# Patient Record
Sex: MALE | Race: BLACK OR AFRICAN AMERICAN | Employment: FULL TIME | URBAN - METROPOLITAN AREA
[De-identification: names, ages, dates, MRNs, and addresses within clinical notes are randomized per-mention and may not be internally consistent; named-entity substitution may affect disease eponyms.]

---

## 2022-09-28 ENCOUNTER — HOSPITAL ENCOUNTER (EMERGENCY)
Age: 42
Discharge: HOME | End: 2022-09-29
Payer: MEDICAID

## 2022-09-28 VITALS
OXYGEN SATURATION: 97 % | RESPIRATION RATE: 18 BRPM | BODY MASS INDEX: 32.58 KG/M2 | DIASTOLIC BLOOD PRESSURE: 67 MMHG | TEMPERATURE: 98.8 F | HEART RATE: 72 BPM | SYSTOLIC BLOOD PRESSURE: 114 MMHG | HEIGHT: 69 IN | WEIGHT: 220 LBS

## 2022-09-28 DIAGNOSIS — H61.23 BILATERAL IMPACTED CERUMEN: ICD-10-CM

## 2022-09-28 DIAGNOSIS — J02.0 STREPTOCOCCAL SORE THROAT: Primary | ICD-10-CM

## 2022-09-28 DIAGNOSIS — R05.9 COUGH: ICD-10-CM

## 2022-09-28 LAB — STREP GRP A PCR: POSITIVE

## 2022-09-28 PROCEDURE — 99283 EMERGENCY DEPT VISIT LOW MDM: CPT

## 2022-09-28 PROCEDURE — 87502 INFLUENZA DNA AMP PROBE: CPT

## 2022-09-28 PROCEDURE — 87651 STREP A DNA AMP PROBE: CPT

## 2022-09-28 PROCEDURE — 87635 SARS-COV-2 COVID-19 AMP PRB: CPT

## 2022-09-28 RX ORDER — IBUPROFEN 800 MG/1
800 TABLET ORAL ONCE
Status: COMPLETED | OUTPATIENT
Start: 2022-09-28 | End: 2022-09-29

## 2022-09-28 RX ORDER — BENZONATATE 100 MG/1
100 CAPSULE ORAL ONCE
Status: COMPLETED | OUTPATIENT
Start: 2022-09-28 | End: 2022-09-29

## 2022-09-28 ASSESSMENT — PAIN DESCRIPTION - LOCATION: LOCATION: GENERALIZED

## 2022-09-28 ASSESSMENT — PAIN - FUNCTIONAL ASSESSMENT: PAIN_FUNCTIONAL_ASSESSMENT: 0-10

## 2022-09-28 ASSESSMENT — PAIN SCALES - GENERAL: PAINLEVEL_OUTOF10: 7

## 2022-09-28 ASSESSMENT — PAIN DESCRIPTION - PAIN TYPE: TYPE: ACUTE PAIN

## 2022-09-28 ASSESSMENT — PAIN DESCRIPTION - FREQUENCY: FREQUENCY: CONTINUOUS

## 2022-09-28 ASSESSMENT — PAIN DESCRIPTION - DESCRIPTORS: DESCRIPTORS: ACHING

## 2022-09-28 NOTE — Clinical Note
Kiesha Delgado was seen and treated in our emergency department on 9/28/2022. He may return to work on 10/01/2022. If you have any questions or concerns, please don't hesitate to call.       Brayden Banks PA-C

## 2022-09-29 LAB
INFLUENZA A BY PCR: NEGATIVE
INFLUENZA B BY PCR: NEGATIVE
SARS-COV-2, NAAT: NOT DETECTED

## 2022-09-29 PROCEDURE — 6370000000 HC RX 637 (ALT 250 FOR IP): Performed by: PHYSICIAN ASSISTANT

## 2022-09-29 RX ORDER — BENZONATATE 100 MG/1
100 CAPSULE ORAL 3 TIMES DAILY PRN
Qty: 20 CAPSULE | Refills: 0 | Status: SHIPPED | OUTPATIENT
Start: 2022-09-29

## 2022-09-29 RX ORDER — IBUPROFEN 600 MG/1
600 TABLET ORAL EVERY 8 HOURS PRN
Qty: 20 TABLET | Refills: 0 | Status: SHIPPED | OUTPATIENT
Start: 2022-09-29

## 2022-09-29 RX ORDER — AMOXICILLIN 500 MG/1
500 CAPSULE ORAL 3 TIMES DAILY
Qty: 30 CAPSULE | Refills: 0 | Status: SHIPPED | OUTPATIENT
Start: 2022-09-29 | End: 2022-10-09

## 2022-09-29 RX ORDER — AMOXICILLIN 500 MG/1
500 CAPSULE ORAL ONCE
Status: COMPLETED | OUTPATIENT
Start: 2022-09-29 | End: 2022-09-29

## 2022-09-29 RX ADMIN — AMOXICILLIN 500 MG: 500 CAPSULE ORAL at 00:07

## 2022-09-29 RX ADMIN — BENZONATATE 100 MG: 100 CAPSULE ORAL at 00:07

## 2022-09-29 RX ADMIN — IBUPROFEN 800 MG: 800 TABLET, FILM COATED ORAL at 00:07

## 2022-09-29 ASSESSMENT — ENCOUNTER SYMPTOMS
NAUSEA: 1
ANAL BLEEDING: 0
SHORTNESS OF BREATH: 0
VOMITING: 0
COUGH: 1
EYE DISCHARGE: 0
APNEA: 0
VOICE CHANGE: 0
ABDOMINAL DISTENTION: 0
ABDOMINAL PAIN: 0
BACK PAIN: 0

## 2022-09-29 ASSESSMENT — PAIN - FUNCTIONAL ASSESSMENT: PAIN_FUNCTIONAL_ASSESSMENT: NONE - DENIES PAIN

## 2022-09-29 NOTE — ED PROVIDER NOTES
[address] [facility] ED  eMERGENCY dEPARTMENT eNCOUnter      Pt Name: Carly Crouch  MRN: [id number]  [place] 1980  Date of evaluation: 9/28/2022  Provider: Josie Hammans, PA-C    CHIEF COMPLAINT       Chief Complaint   Patient presents with    Concern For COVID-19     Body aches,    Nausea    Diarrhea         HISTORY OF PRESENT ILLNESS   (Location/Symptom, Timing/Onset,Context/Setting, Quality, Duration, Modifying Factors, Severity)  Note limiting factors. Carly Crouch is a 42 y.o. male who presents to the emergency department  pt has 2 day hx of cough sore thorat nausea and feeling hot/cold. Took tylenol prior to arrival. Symptoms mild to moderate in severity. HPI    NursingNotes were reviewed. REVIEW OF SYSTEMS    (2-9 systems for level 4, 10 or more for level 5)     Review of Systems   Constitutional:  Positive for chills and fever. Negative for activity change, appetite change and unexpected weight change. HENT:  Negative for ear discharge, nosebleeds and voice change. Eyes:  Negative for discharge. Respiratory:  Positive for cough. Negative for apnea and shortness of breath. Cardiovascular:  Negative for chest pain and leg swelling. Gastrointestinal:  Positive for nausea. Negative for abdominal distention, abdominal pain, anal bleeding and vomiting. Genitourinary:  Negative for dysuria and hematuria. Musculoskeletal:  Positive for myalgias. Negative for back pain, joint swelling, neck pain and neck stiffness. Skin:  Negative for pallor. Neurological:  Negative for weakness. Hematological:  Does not bruise/bleed easily. Psychiatric/Behavioral:  Positive for sleep disturbance. Negative for self-injury. All other systems reviewed and are negative. Except as noted above the remainder of the review of systems was reviewed and negative.        PAST MEDICAL HISTORY     Past Medical History:   Diagnosis Date    Anxiety     Chronic back pain     Depression Diverticulitis     Fatty liver          SURGICALHISTORY       Past Surgical History:   Procedure Laterality Date    COLONOSCOPY  1/20/15    UPPER GASTROINTESTINAL ENDOSCOPY  1/20/15         CURRENT MEDICATIONS       Previous Medications    No medications on file            Patient has no known allergies. FAMILY HISTORY       Family History   Problem Relation Age of Onset    Diabetes Mother     Cancer Mother     Arthritis Father     Other Father         anxiety    Asthma Sister     Asthma Brother           SOCIAL HISTORY       Social History     Socioeconomic History    Marital status:      Spouse name: None    Number of children: None    Years of education: None    Highest education level: None   Tobacco Use    Smoking status: Every Day     Types: E-Cigarettes    Smokeless tobacco: Never   Substance and Sexual Activity    Alcohol use: Yes     Comment: ocasionally    Drug use: Yes     Types: Marijuana (Weed)     Comment: daily    Sexual activity: Yes       SCREENINGS   Reta Coma Scale  Eye Opening: Spontaneous  Best Verbal Response: Oriented  Best Motor Response: Obeys commands  [place] Coma Scale Score: 15  Ebola Virus Disease (EVD) Screening   Temp: 98.8 °F (37.1 °C)  CIWA Assessment  BP: 114/67  Heart Rate: 72    PHYSICAL EXAM    (up to 7 for level 4, 8 or more for level 5)     ED Triage Vitals [09/28/22 2311]   BP Temp Temp Source Heart Rate Resp SpO2 Height Weight   114/67 98.8 °F (37.1 °C) Oral 72 18 97 % 5' 9\" (1.753 m) 220 lb (99.8 kg)       Physical Exam  Vitals and nursing note reviewed. Constitutional:       General: He is not in acute distress. Appearance: He is well-developed. HENT:      Head: Normocephalic and atraumatic. Right Ear: External ear normal. There is impacted cerumen. Left Ear: External ear normal. There is impacted cerumen. Nose: Nose normal.      Mouth/Throat:      Mouth: Mucous membranes are moist.      Pharynx: No oropharyngeal exudate.    Eyes: General:         Right eye: No discharge. Left eye: No discharge. Pupils: Pupils are equal, round, and reactive to light. Cardiovascular:      Rate and Rhythm: Normal rate and regular rhythm. Pulses: Normal pulses. Heart sounds: Normal heart sounds. Pulmonary:      Effort: Pulmonary effort is normal. No respiratory distress. Breath sounds: Normal breath sounds. No stridor. Abdominal:      General: Bowel sounds are normal. There is no distension. Palpations: Abdomen is soft. Tenderness: There is no abdominal tenderness. Musculoskeletal:         General: Normal range of motion. Cervical back: Normal range of motion and neck supple. Skin:     General: Skin is warm. Findings: No erythema. Neurological:      Mental Status: He is alert and oriented to person, place, and time. Psychiatric:         Mood and Affect: Mood normal.       RESULTS     EKG: All EKG's are interpreted by the Emergency Department Physician who either signs or Co-signsthis chart in the absence of a cardiologist.         RADIOLOGY:   Donivan Sleek such as CT, Ultrasound and MRI are read by the radiologist. Plain radiographic images are visualized and preliminarily interpreted by the emergency physician with the below findings:           Interpretation per the Radiologist below, if available at the time ofthis note:    No orders to display         ED BEDSIDE ULTRASOUND:   Performed by ED Physician - none    LABS:  Labs Reviewed   RAPID STREP SCREEN - Abnormal; Notable for the following components:       Result Value    Strep Grp A PCR POSITIVE (*)     All other components within normal limits   COVID-19, RAPID   RAPID INFLUENZA A/B ANTIGENS       All other labs were within normal range or not returned as of this dictation.     EMERGENCY DEPARTMENT COURSE and DIFFERENTIAL DIAGNOSIS/MDM:   Vitals:    Vitals:    09/28/22 2311   BP: 114/67   Pulse: 72   Resp: 18   Temp: 98.8 °F (37.1 °C)

## 2022-09-29 NOTE — ED TRIAGE NOTES
Pt here for complaints of covid symptoms  He complains of diarrhea, chills, body aches and sore throat  Pt states he took a home covid test but it was negative  Pt states he is unable to eat anything  Pt admits to smoking marijuana daily. He vapes daily.    Pt alert and oriented x 4

## 2023-12-06 ENCOUNTER — APPOINTMENT (OUTPATIENT)
Dept: RADIOLOGY | Facility: HOSPITAL | Age: 43
End: 2023-12-06
Payer: COMMERCIAL

## 2023-12-06 ENCOUNTER — APPOINTMENT (OUTPATIENT)
Dept: CARDIOLOGY | Facility: HOSPITAL | Age: 43
End: 2023-12-06
Payer: COMMERCIAL

## 2023-12-06 ENCOUNTER — HOSPITAL ENCOUNTER (OUTPATIENT)
Facility: HOSPITAL | Age: 43
Setting detail: OBSERVATION
Discharge: HOME | End: 2023-12-07
Attending: EMERGENCY MEDICINE | Admitting: STUDENT IN AN ORGANIZED HEALTH CARE EDUCATION/TRAINING PROGRAM
Payer: COMMERCIAL

## 2023-12-06 DIAGNOSIS — I10 ESSENTIAL (PRIMARY) HYPERTENSION: ICD-10-CM

## 2023-12-06 DIAGNOSIS — F14.90 COCAINE USE: ICD-10-CM

## 2023-12-06 DIAGNOSIS — I16.0 HYPERTENSIVE URGENCY: Primary | ICD-10-CM

## 2023-12-06 DIAGNOSIS — G45.9 TIA (TRANSIENT ISCHEMIC ATTACK): ICD-10-CM

## 2023-12-06 DIAGNOSIS — R20.2 PARESTHESIA OF LEFT ARM: ICD-10-CM

## 2023-12-06 LAB
ALBUMIN SERPL BCP-MCNC: 4.6 G/DL (ref 3.4–5)
ALP SERPL-CCNC: 67 U/L (ref 33–120)
ALT SERPL W P-5'-P-CCNC: 22 U/L (ref 10–52)
AMPHETAMINES UR QL SCN: ABNORMAL
ANION GAP SERPL CALC-SCNC: 13 MMOL/L (ref 10–20)
AORTIC VALVE MEAN GRADIENT: 5
AORTIC VALVE PEAK VELOCITY: 1.55
APTT PPP: 31 SECONDS (ref 27–38)
AST SERPL W P-5'-P-CCNC: 18 U/L (ref 9–39)
AV PEAK GRADIENT: 9.6
AVA (PEAK VEL): 2.61
AVA (VTI): 2.34
BARBITURATES UR QL SCN: ABNORMAL
BASOPHILS # BLD AUTO: 0.03 X10*3/UL (ref 0–0.1)
BASOPHILS NFR BLD AUTO: 0.3 %
BENZODIAZ UR QL SCN: ABNORMAL
BILIRUB SERPL-MCNC: 0.5 MG/DL (ref 0–1.2)
BUN SERPL-MCNC: 11 MG/DL (ref 6–23)
BZE UR QL SCN: ABNORMAL
CALCIUM SERPL-MCNC: 9.8 MG/DL (ref 8.6–10.3)
CANNABINOIDS UR QL SCN: ABNORMAL
CARDIAC TROPONIN I PNL SERPL HS: 5 NG/L (ref 0–20)
CHLORIDE SERPL-SCNC: 99 MMOL/L (ref 98–107)
CHOLEST SERPL-MCNC: 110 MG/DL (ref 0–199)
CHOLESTEROL/HDL RATIO: 2.4
CO2 SERPL-SCNC: 29 MMOL/L (ref 21–32)
CREAT SERPL-MCNC: 0.9 MG/DL (ref 0.5–1.3)
EJECTION FRACTION APICAL 4 CHAMBER: 66.5
EJECTION FRACTION: 64
EOSINOPHIL # BLD AUTO: 0.03 X10*3/UL (ref 0–0.7)
EOSINOPHIL NFR BLD AUTO: 0.3 %
ERYTHROCYTE [DISTWIDTH] IN BLOOD BY AUTOMATED COUNT: 15.1 % (ref 11.5–14.5)
FENTANYL+NORFENTANYL UR QL SCN: ABNORMAL
GFR SERPL CREATININE-BSD FRML MDRD: >90 ML/MIN/1.73M*2
GLUCOSE BLD MANUAL STRIP-MCNC: 128 MG/DL (ref 74–99)
GLUCOSE BLD MANUAL STRIP-MCNC: 157 MG/DL (ref 74–99)
GLUCOSE BLD MANUAL STRIP-MCNC: 89 MG/DL (ref 74–99)
GLUCOSE SERPL-MCNC: 100 MG/DL (ref 74–99)
HCT VFR BLD AUTO: 45.7 % (ref 41–52)
HDLC SERPL-MCNC: 46.6 MG/DL
HGB BLD-MCNC: 14.6 G/DL (ref 13.5–17.5)
HGB RETIC QN: 28 PG (ref 28–38)
HOLD SPECIMEN: NORMAL
IMM GRANULOCYTES # BLD AUTO: 0.02 X10*3/UL (ref 0–0.7)
IMM GRANULOCYTES NFR BLD AUTO: 0.2 % (ref 0–0.9)
IMMATURE RETIC FRACTION: 9.8 %
INR PPP: 1.1 (ref 0.9–1.1)
LDH SERPL L TO P-CCNC: 125 U/L (ref 84–246)
LDLC SERPL CALC-MCNC: 48 MG/DL
LEFT ATRIUM VOLUME AREA LENGTH INDEX BSA: 18
LEFT VENTRICLE INTERNAL DIMENSION DIASTOLE: 4.75 (ref 3.5–6)
LEFT VENTRICULAR OUTFLOW TRACT DIAMETER: 2
LYMPHOCYTES # BLD AUTO: 2.23 X10*3/UL (ref 1.2–4.8)
LYMPHOCYTES NFR BLD AUTO: 24.6 %
MCH RBC QN AUTO: 25.7 PG (ref 26–34)
MCHC RBC AUTO-ENTMCNC: 31.9 G/DL (ref 32–36)
MCV RBC AUTO: 80 FL (ref 80–100)
MITRAL VALVE E/A RATIO: 1
MITRAL VALVE E/E' RATIO: 8.58
MONOCYTES # BLD AUTO: 0.7 X10*3/UL (ref 0.1–1)
MONOCYTES NFR BLD AUTO: 7.7 %
NEUTROPHILS # BLD AUTO: 6.04 X10*3/UL (ref 1.2–7.7)
NEUTROPHILS NFR BLD AUTO: 66.9 %
NON HDL CHOLESTEROL: 63 MG/DL (ref 0–149)
NRBC BLD-RTO: 0 /100 WBCS (ref 0–0)
OPIATES UR QL SCN: ABNORMAL
OXYCODONE+OXYMORPHONE UR QL SCN: ABNORMAL
PCP UR QL SCN: ABNORMAL
PLATELET # BLD AUTO: 260 X10*3/UL (ref 150–450)
POTASSIUM SERPL-SCNC: 3.7 MMOL/L (ref 3.5–5.3)
PROT SERPL-MCNC: 7.4 G/DL (ref 6.4–8.2)
PROTHROMBIN TIME: 12.8 SECONDS (ref 9.8–12.8)
RBC # BLD AUTO: 5.69 X10*6/UL (ref 4.5–5.9)
RETICS #: 0.07 X10*6/UL (ref 0.02–0.12)
RETICS/RBC NFR AUTO: 1.3 % (ref 0.5–2)
RIGHT VENTRICLE FREE WALL PEAK S': 13.3
RIGHT VENTRICLE PEAK SYSTOLIC PRESSURE: 16.1
SODIUM SERPL-SCNC: 137 MMOL/L (ref 136–145)
TRICUSPID ANNULAR PLANE SYSTOLIC EXCURSION: 2.7
TRIGL SERPL-MCNC: 75 MG/DL (ref 0–149)
VLDL: 15 MG/DL (ref 0–40)
WBC # BLD AUTO: 9.1 X10*3/UL (ref 4.4–11.3)

## 2023-12-06 PROCEDURE — 70551 MRI BRAIN STEM W/O DYE: CPT

## 2023-12-06 PROCEDURE — 85025 COMPLETE CBC W/AUTO DIFF WBC: CPT | Performed by: EMERGENCY MEDICINE

## 2023-12-06 PROCEDURE — 85730 THROMBOPLASTIN TIME PARTIAL: CPT | Performed by: EMERGENCY MEDICINE

## 2023-12-06 PROCEDURE — 93880 EXTRACRANIAL BILAT STUDY: CPT

## 2023-12-06 PROCEDURE — 96372 THER/PROPH/DIAG INJ SC/IM: CPT | Performed by: STUDENT IN AN ORGANIZED HEALTH CARE EDUCATION/TRAINING PROGRAM

## 2023-12-06 PROCEDURE — 80053 COMPREHEN METABOLIC PANEL: CPT | Performed by: EMERGENCY MEDICINE

## 2023-12-06 PROCEDURE — 93005 ELECTROCARDIOGRAM TRACING: CPT

## 2023-12-06 PROCEDURE — G0378 HOSPITAL OBSERVATION PER HR: HCPCS

## 2023-12-06 PROCEDURE — 83036 HEMOGLOBIN GLYCOSYLATED A1C: CPT | Mod: ELYLAB | Performed by: STUDENT IN AN ORGANIZED HEALTH CARE EDUCATION/TRAINING PROGRAM

## 2023-12-06 PROCEDURE — 99223 1ST HOSP IP/OBS HIGH 75: CPT | Performed by: STUDENT IN AN ORGANIZED HEALTH CARE EDUCATION/TRAINING PROGRAM

## 2023-12-06 PROCEDURE — 70551 MRI BRAIN STEM W/O DYE: CPT | Performed by: RADIOLOGY

## 2023-12-06 PROCEDURE — 93306 TTE W/DOPPLER COMPLETE: CPT

## 2023-12-06 PROCEDURE — 99285 EMERGENCY DEPT VISIT HI MDM: CPT | Performed by: EMERGENCY MEDICINE

## 2023-12-06 PROCEDURE — 85610 PROTHROMBIN TIME: CPT | Performed by: EMERGENCY MEDICINE

## 2023-12-06 PROCEDURE — 2500000001 HC RX 250 WO HCPCS SELF ADMINISTERED DRUGS (ALT 637 FOR MEDICARE OP): Performed by: STUDENT IN AN ORGANIZED HEALTH CARE EDUCATION/TRAINING PROGRAM

## 2023-12-06 PROCEDURE — 70450 CT HEAD/BRAIN W/O DYE: CPT

## 2023-12-06 PROCEDURE — 93306 TTE W/DOPPLER COMPLETE: CPT | Performed by: INTERNAL MEDICINE

## 2023-12-06 PROCEDURE — 2500000001 HC RX 250 WO HCPCS SELF ADMINISTERED DRUGS (ALT 637 FOR MEDICARE OP): Performed by: EMERGENCY MEDICINE

## 2023-12-06 PROCEDURE — 80061 LIPID PANEL: CPT | Performed by: STUDENT IN AN ORGANIZED HEALTH CARE EDUCATION/TRAINING PROGRAM

## 2023-12-06 PROCEDURE — 83615 LACTATE (LD) (LDH) ENZYME: CPT | Performed by: EMERGENCY MEDICINE

## 2023-12-06 PROCEDURE — 93880 EXTRACRANIAL BILAT STUDY: CPT | Performed by: RADIOLOGY

## 2023-12-06 PROCEDURE — 80307 DRUG TEST PRSMV CHEM ANLYZR: CPT | Performed by: EMERGENCY MEDICINE

## 2023-12-06 PROCEDURE — 2500000004 HC RX 250 GENERAL PHARMACY W/ HCPCS (ALT 636 FOR OP/ED): Performed by: EMERGENCY MEDICINE

## 2023-12-06 PROCEDURE — 84484 ASSAY OF TROPONIN QUANT: CPT | Performed by: EMERGENCY MEDICINE

## 2023-12-06 PROCEDURE — 82947 ASSAY GLUCOSE BLOOD QUANT: CPT | Mod: 59

## 2023-12-06 PROCEDURE — 96360 HYDRATION IV INFUSION INIT: CPT

## 2023-12-06 PROCEDURE — 70450 CT HEAD/BRAIN W/O DYE: CPT | Performed by: RADIOLOGY

## 2023-12-06 PROCEDURE — 85045 AUTOMATED RETICULOCYTE COUNT: CPT | Performed by: EMERGENCY MEDICINE

## 2023-12-06 PROCEDURE — 36415 COLL VENOUS BLD VENIPUNCTURE: CPT | Performed by: EMERGENCY MEDICINE

## 2023-12-06 PROCEDURE — 2500000004 HC RX 250 GENERAL PHARMACY W/ HCPCS (ALT 636 FOR OP/ED): Performed by: STUDENT IN AN ORGANIZED HEALTH CARE EDUCATION/TRAINING PROGRAM

## 2023-12-06 RX ORDER — NAPROXEN SODIUM 220 MG/1
81 TABLET, FILM COATED ORAL DAILY
Status: DISCONTINUED | OUTPATIENT
Start: 2023-12-07 | End: 2023-12-07 | Stop reason: HOSPADM

## 2023-12-06 RX ORDER — ENOXAPARIN SODIUM 100 MG/ML
40 INJECTION SUBCUTANEOUS EVERY 24 HOURS
Status: DISCONTINUED | OUTPATIENT
Start: 2023-12-06 | End: 2023-12-07 | Stop reason: HOSPADM

## 2023-12-06 RX ORDER — LABETALOL HYDROCHLORIDE 5 MG/ML
10 INJECTION, SOLUTION INTRAVENOUS EVERY 10 MIN PRN
Status: DISCONTINUED | OUTPATIENT
Start: 2023-12-06 | End: 2023-12-07 | Stop reason: HOSPADM

## 2023-12-06 RX ORDER — HYDRALAZINE HYDROCHLORIDE 25 MG/1
25 TABLET, FILM COATED ORAL EVERY 6 HOURS PRN
Status: DISCONTINUED | OUTPATIENT
Start: 2023-12-08 | End: 2023-12-07 | Stop reason: HOSPADM

## 2023-12-06 RX ORDER — HYDRALAZINE HYDROCHLORIDE 20 MG/ML
10 INJECTION INTRAMUSCULAR; INTRAVENOUS
Status: DISCONTINUED | OUTPATIENT
Start: 2023-12-06 | End: 2023-12-07 | Stop reason: HOSPADM

## 2023-12-06 RX ORDER — ATORVASTATIN CALCIUM 20 MG/1
40 TABLET, FILM COATED ORAL NIGHTLY
Status: DISCONTINUED | OUTPATIENT
Start: 2023-12-06 | End: 2023-12-07

## 2023-12-06 RX ORDER — NAPROXEN SODIUM 220 MG/1
324 TABLET, FILM COATED ORAL ONCE
Status: COMPLETED | OUTPATIENT
Start: 2023-12-06 | End: 2023-12-06

## 2023-12-06 RX ADMIN — SODIUM CHLORIDE 1000 ML: 9 INJECTION, SOLUTION INTRAVENOUS at 10:14

## 2023-12-06 RX ADMIN — ENOXAPARIN SODIUM 40 MG: 40 INJECTION SUBCUTANEOUS at 15:59

## 2023-12-06 RX ADMIN — ASPIRIN 324 MG: 81 TABLET, CHEWABLE ORAL at 12:22

## 2023-12-06 RX ADMIN — ATORVASTATIN CALCIUM 40 MG: 20 TABLET, FILM COATED ORAL at 20:39

## 2023-12-06 SDOH — SOCIAL STABILITY: SOCIAL INSECURITY: DO YOU FEEL ANYONE HAS EXPLOITED OR TAKEN ADVANTAGE OF YOU FINANCIALLY OR OF YOUR PERSONAL PROPERTY?: NO

## 2023-12-06 SDOH — SOCIAL STABILITY: SOCIAL INSECURITY: DO YOU FEEL UNSAFE GOING BACK TO THE PLACE WHERE YOU ARE LIVING?: NO

## 2023-12-06 SDOH — SOCIAL STABILITY: SOCIAL INSECURITY: WERE YOU ABLE TO COMPLETE ALL THE BEHAVIORAL HEALTH SCREENINGS?: YES

## 2023-12-06 SDOH — SOCIAL STABILITY: SOCIAL INSECURITY: HAVE YOU HAD THOUGHTS OF HARMING ANYONE ELSE?: NO

## 2023-12-06 SDOH — SOCIAL STABILITY: SOCIAL INSECURITY: DOES ANYONE TRY TO KEEP YOU FROM HAVING/CONTACTING OTHER FRIENDS OR DOING THINGS OUTSIDE YOUR HOME?: NO

## 2023-12-06 SDOH — SOCIAL STABILITY: SOCIAL INSECURITY: ARE THERE ANY APPARENT SIGNS OF INJURIES/BEHAVIORS THAT COULD BE RELATED TO ABUSE/NEGLECT?: NO

## 2023-12-06 SDOH — SOCIAL STABILITY: SOCIAL INSECURITY: ABUSE: ADULT

## 2023-12-06 SDOH — SOCIAL STABILITY: SOCIAL INSECURITY: ARE YOU OR HAVE YOU BEEN THREATENED OR ABUSED PHYSICALLY, EMOTIONALLY, OR SEXUALLY BY ANYONE?: NO

## 2023-12-06 SDOH — SOCIAL STABILITY: SOCIAL INSECURITY: HAS ANYONE EVER THREATENED TO HURT YOUR FAMILY OR YOUR PETS?: NO

## 2023-12-06 ASSESSMENT — COLUMBIA-SUICIDE SEVERITY RATING SCALE - C-SSRS
1. IN THE PAST MONTH, HAVE YOU WISHED YOU WERE DEAD OR WISHED YOU COULD GO TO SLEEP AND NOT WAKE UP?: NO
6. HAVE YOU EVER DONE ANYTHING, STARTED TO DO ANYTHING, OR PREPARED TO DO ANYTHING TO END YOUR LIFE?: NO
2. HAVE YOU ACTUALLY HAD ANY THOUGHTS OF KILLING YOURSELF?: NO

## 2023-12-06 ASSESSMENT — COGNITIVE AND FUNCTIONAL STATUS - GENERAL
MOBILITY SCORE: 24
DAILY ACTIVITIY SCORE: 24
MOBILITY SCORE: 24
PATIENT BASELINE BEDBOUND: NO
DAILY ACTIVITIY SCORE: 24

## 2023-12-06 ASSESSMENT — PATIENT HEALTH QUESTIONNAIRE - PHQ9
1. LITTLE INTEREST OR PLEASURE IN DOING THINGS: SEVERAL DAYS
SUM OF ALL RESPONSES TO PHQ9 QUESTIONS 1 & 2: 2
2. FEELING DOWN, DEPRESSED OR HOPELESS: SEVERAL DAYS

## 2023-12-06 ASSESSMENT — LIFESTYLE VARIABLES
HAVE PEOPLE ANNOYED YOU BY CRITICIZING YOUR DRINKING: NO
REASON UNABLE TO ASSESS: NO
HOW OFTEN DO YOU HAVE A DRINK CONTAINING ALCOHOL: 2-3 TIMES A WEEK
HOW MANY STANDARD DRINKS CONTAINING ALCOHOL DO YOU HAVE ON A TYPICAL DAY: 1 OR 2
AUDIT-C TOTAL SCORE: 6
HOW OFTEN DO YOU HAVE 6 OR MORE DRINKS ON ONE OCCASION: WEEKLY
EVER HAD A DRINK FIRST THING IN THE MORNING TO STEADY YOUR NERVES TO GET RID OF A HANGOVER: NO
SKIP TO QUESTIONS 9-10: 0
AUDIT-C TOTAL SCORE: 6
HAVE YOU EVER FELT YOU SHOULD CUT DOWN ON YOUR DRINKING: NO
EVER FELT BAD OR GUILTY ABOUT YOUR DRINKING: NO

## 2023-12-06 ASSESSMENT — ACTIVITIES OF DAILY LIVING (ADL)
JUDGMENT_ADEQUATE_SAFELY_COMPLETE_DAILY_ACTIVITIES: YES
TOILETING: INDEPENDENT
ADEQUATE_TO_COMPLETE_ADL: YES
DRESSING YOURSELF: INDEPENDENT
GROOMING: INDEPENDENT
FEEDING YOURSELF: INDEPENDENT
WALKS IN HOME: INDEPENDENT
HEARING - RIGHT EAR: FUNCTIONAL
PATIENT'S MEMORY ADEQUATE TO SAFELY COMPLETE DAILY ACTIVITIES?: YES
BATHING: INDEPENDENT
LACK_OF_TRANSPORTATION: NO
HEARING - LEFT EAR: FUNCTIONAL

## 2023-12-06 ASSESSMENT — PAIN SCALES - GENERAL
PAINLEVEL_OUTOF10: 0 - NO PAIN
PAINLEVEL_OUTOF10: 0 - NO PAIN

## 2023-12-06 ASSESSMENT — PAIN - FUNCTIONAL ASSESSMENT
PAIN_FUNCTIONAL_ASSESSMENT: 0-10
PAIN_FUNCTIONAL_ASSESSMENT: 0-10

## 2023-12-06 NOTE — CONSULTS
Inpatient consult to Neurology  Consult performed by: ARELI Cline-CNP  Consult ordered by: Varghese Kendall MD          History Of Present Illness  Jean-Pierre Stubbs is a 43 y.o. male presenting with left arm numbness and tingling. Jean-Pierre Stubbs is a 43 y.o. with a history of untreated hypertension, intermittent drug use with cocaine, possible sickle cell trait or thalassemia per his history who presents today with new onset of tingling in his left arm.  Patient was at work today when he developed symptoms of tingling in the upper arm and cold feet.  He did not feel well and told his employer.  His blood pressure was reportedly high.  EMS was called and patient was sent in.   I have seen and examined the patient. He continues to have left sided numbness and tingling in LUE/LLE, as well as slurred speech. He has never experienced these symptoms before. Denies headache, dizziness, but did have some blurry vision in both eyes during incident, no longer present. No LOC, no loss of bowel or bladder, no biting tongue. No history of stroke or seizure. CT in ED did not show acute infarct or bleed. He was started on aspirin and statin.   Risk Factors:  He does smoke cigarettes and black and milds every day, and uses cocaine. Denies alcohol use. His BP was elevated at 175/105. Family history.    Review of systems are negative unless otherwise specified in HPI.   Past Medical History  No past medical history on file.  Surgical History  No past surgical history on file.  Social History  Social History     Tobacco Use    Smoking status: Every Day     Types: Cigarettes    Smokeless tobacco: Current     Allergies  Patient has no known allergies.  No medications prior to admission.       Review of Systems  Neurological Exam  Mental Status  Awake, alert and oriented to person, place and time. Mild dysarthria present. Language is fluent with no aphasia. Attention and concentration are normal.    Cranial Nerves  CN III, IV, VI:  "Pupils equal round and reactive to light bilaterally.  And EOM's Normal.    Motor   Strength is 5/5 throughout all four extremities.    Sensory  Light touch abnormality:   Left face, left arm, left leg.    Physical Exam  Eyes:      Pupils: Pupils are equal, round, and reactive to light.   Neurological:      Cranial Nerves: Dysarthria present.      Motor: Motor strength is normal.        Last Recorded Vitals  Blood pressure (!) 169/97, pulse 68, temperature 36.7 °C (98.1 °F), resp. rate 18, height 1.753 m (5' 9\"), weight 95 kg (209 lb 7 oz), SpO2 95 %.    Relevant Results  Results for orders placed or performed during the hospital encounter of 12/06/23 (from the past 24 hour(s))   CBC and Auto Differential   Result Value Ref Range    WBC 9.1 4.4 - 11.3 x10*3/uL    nRBC 0.0 0.0 - 0.0 /100 WBCs    RBC 5.69 4.50 - 5.90 x10*6/uL    Hemoglobin 14.6 13.5 - 17.5 g/dL    Hematocrit 45.7 41.0 - 52.0 %    MCV 80 80 - 100 fL    MCH 25.7 (L) 26.0 - 34.0 pg    MCHC 31.9 (L) 32.0 - 36.0 g/dL    RDW 15.1 (H) 11.5 - 14.5 %    Platelets 260 150 - 450 x10*3/uL    Neutrophils % 66.9 40.0 - 80.0 %    Immature Granulocytes %, Automated 0.2 0.0 - 0.9 %    Lymphocytes % 24.6 13.0 - 44.0 %    Monocytes % 7.7 2.0 - 10.0 %    Eosinophils % 0.3 0.0 - 6.0 %    Basophils % 0.3 0.0 - 2.0 %    Neutrophils Absolute 6.04 1.20 - 7.70 x10*3/uL    Immature Granulocytes Absolute, Automated 0.02 0.00 - 0.70 x10*3/uL    Lymphocytes Absolute 2.23 1.20 - 4.80 x10*3/uL    Monocytes Absolute 0.70 0.10 - 1.00 x10*3/uL    Eosinophils Absolute 0.03 0.00 - 0.70 x10*3/uL    Basophils Absolute 0.03 0.00 - 0.10 x10*3/uL   Comprehensive metabolic panel   Result Value Ref Range    Glucose 100 (H) 74 - 99 mg/dL    Sodium 137 136 - 145 mmol/L    Potassium 3.7 3.5 - 5.3 mmol/L    Chloride 99 98 - 107 mmol/L    Bicarbonate 29 21 - 32 mmol/L    Anion Gap 13 10 - 20 mmol/L    Urea Nitrogen 11 6 - 23 mg/dL    Creatinine 0.90 0.50 - 1.30 mg/dL    eGFR >90 >60 mL/min/1.73m*2 "    Calcium 9.8 8.6 - 10.3 mg/dL    Albumin 4.6 3.4 - 5.0 g/dL    Alkaline Phosphatase 67 33 - 120 U/L    Total Protein 7.4 6.4 - 8.2 g/dL    AST 18 9 - 39 U/L    Bilirubin, Total 0.5 0.0 - 1.2 mg/dL    ALT 22 10 - 52 U/L   Troponin I, High Sensitivity   Result Value Ref Range    Troponin I, High Sensitivity 5 0 - 20 ng/L   Protime-INR   Result Value Ref Range    Protime 12.8 9.8 - 12.8 seconds    INR 1.1 0.9 - 1.1   APTT   Result Value Ref Range    aPTT 31 27 - 38 seconds   Reticulocytes   Result Value Ref Range    Retic % 1.3 0.5 - 2.0 %    Retic Absolute 0.073 0.022 - 0.118 x10*6/uL    Reticulocyte Hemoglobin 28 28 - 38 pg    Immature Retic fraction 9.8 <=16.0 %   LDH, Lactate dehydrogenase   Result Value Ref Range     84 - 246 U/L   Green Top   Result Value Ref Range    Extra Tube Hold for add-ons.    SST TOP   Result Value Ref Range    Extra Tube Hold for add-ons.    Gray Top   Result Value Ref Range    Extra Tube Hold for add-ons.    Lipid Panel   Result Value Ref Range    Cholesterol 110 0 - 199 mg/dL    HDL-Cholesterol 46.6 mg/dL    Cholesterol/HDL Ratio 2.4     LDL Calculated 48 <=99 mg/dL    VLDL 15 0 - 40 mg/dL    Triglycerides 75 0 - 149 mg/dL    Non HDL Cholesterol 63 0 - 149 mg/dL   Drug Screen, Urine   Result Value Ref Range    Amphetamine Screen, Urine Presumptive Negative Presumptive Negative    Barbiturate Screen, Urine Presumptive Negative Presumptive Negative    Benzodiazepines Screen, Urine Presumptive Negative Presumptive Negative    Cannabinoid Screen, Urine Presumptive Positive (A) Presumptive Negative    Cocaine Metabolite Screen, Urine Presumptive Positive (A) Presumptive Negative    Fentanyl Screen, Urine Presumptive Negative Presumptive Negative    Opiate Screen, Urine Presumptive Negative Presumptive Negative    Oxycodone Screen, Urine Presumptive Negative Presumptive Negative    PCP Screen, Urine Presumptive Negative Presumptive Negative   POCT GLUCOSE   Result Value Ref Range     POCT Glucose 89 74 - 99 mg/dL   Carotid duplex bilateral   Result Value Ref Range    BSA 2.15 m2   CT brain attack head wo IV contrast    Result Date: 12/6/2023  Interpreted By:  Eagle Campos, STUDY: CT BRAIN ATTACK HEAD WO IV CONTRAST;  12/6/2023 10:01 am   INDICATION: Signs/Symptoms:Stroke Evaluation.  Left arm tingling, history of sickle cell.   COMPARISON: CT head dated 01/26/2023.   ACCESSION NUMBER(S): [id number]   ORDERING CLINICIAN: DEL FARRELL   TECHNIQUE: Noncontrast axial CT scan of head was performed.   FINDINGS: Parenchyma: There is no intracranial hemorrhage. The grey-white differentiation is intact. There is no mass effect or midline shift.   CSF Spaces: The ventricles, sulci and basal cisterns are within normal limits for age.   Extra-Axial Fluid: There is no extraaxial fluid collection.   Calvarium: No acute osseous abnormality. Unchanged 1.1 cm diameter benign-appearing sclerotic focus within the right parietal bone.   Paranasal sinuses: Visualized paranasal sinuses are clear.   Mastoids: Clear.   Orbits: Normal.   Soft tissues: Unremarkable.       No acute intracranial hemorrhage, mass effect, or CT apparent acute infarct.   MACRO: Eagle Campos discussed the significance and urgency of this critical finding by telephone with  DEL FARRELL on 12/6/2023 at 10:37 am. (**-RCF-**) Findings:  See findings.       Signed by: Eagle Campos 12/6/2023 10:38 AM Dictation workstation:   [id number]   Scheduled medications   Medication Dose Route Frequency    [START ON 12/7/2023] aspirin  81 mg oral Daily    atorvastatin  40 mg oral Nightly    enoxaparin  40 mg subcutaneous q24h    psyllium  1 packet oral Daily           NIH Stroke Scale  1A. Level of Consciousness: Alert, Keenly Responsive  1B. Ask Month and Age: Both Questions Right  1C. Blink Eyes & Squeeze Hands: Performs Both Tasks  2. Best Gaze: Normal  3. Visual: No Visual Loss  4. Facial Palsy: Minor Paralysis  5A. Motor - Left Arm: No  Drift  5B. Motor - Right Arm: No Drift  6A. Motor - Left Leg: No Drift  6B. Motor - Right Leg: No Drift  7. Limb Ataxia: Present in One Limb  8. Sensory Loss: Mild-to-Moderate Sensory Loss  9. Best Language: No Aphasia  10. Dysarthria: Normal  11. Extinction and Inattention: No Abnormality  NIH Stroke Scale: 3           Hibbs Coma Scale  Best Eye Response: Spontaneous  Best Verbal Response: Oriented  Best Motor Response: Follows commands  Stefanie Coma Scale Score: 15                 I have personally reviewed the following imaging results CT brain attack head wo IV contrast    Result Date: 12/6/2023  Interpreted By:  Eagle Campos, STUDY: CT BRAIN ATTACK HEAD WO IV CONTRAST;  12/6/2023 10:01 am   INDICATION: Signs/Symptoms:Stroke Evaluation.  Left arm tingling, history of sickle cell.   COMPARISON: CT head dated 01/26/2023.   ACCESSION NUMBER(S): [id number]   ORDERING CLINICIAN: DEL FARRELL   TECHNIQUE: Noncontrast axial CT scan of head was performed.   FINDINGS: Parenchyma: There is no intracranial hemorrhage. The grey-white differentiation is intact. There is no mass effect or midline shift.   CSF Spaces: The ventricles, sulci and basal cisterns are within normal limits for age.   Extra-Axial Fluid: There is no extraaxial fluid collection.   Calvarium: No acute osseous abnormality. Unchanged 1.1 cm diameter benign-appearing sclerotic focus within the right parietal bone.   Paranasal sinuses: Visualized paranasal sinuses are clear.   Mastoids: Clear.   Orbits: Normal.   Soft tissues: Unremarkable.       No acute intracranial hemorrhage, mass effect, or CT apparent acute infarct.   MACRO: Eagle Campos discussed the significance and urgency of this critical finding by telephone with  DEL FARRELL on 12/6/2023 at 10:37 am. (**-RCF-**) Findings:  See findings.       Signed by: Eagle Campos 12/6/2023 10:38 AM Dictation workstation:   [id number]  .      Assessment/Plan   Principal Problem:    TIA  (transient ischemic attack)  Impression:  Right sided paresthesia and slurred speech r/t hypertensive urgency-MRI brain did not show anything acute, TTE is normal  History of cocaine abuse  History of nicotine abuse    Plan:   Frequent neuro checks  Continue with aspirin and antihypertensives, can discontinue statin for LDL <70  Smoking cessation  Addiction services    Discussed bleeding precautions with patient while on anti platelet and/or anticoagulation therapy. Discussed stroke prevention such as: HgbA1c <7, tight blood pressure control < 130/<80, antiplatelet and statin therapy (if indicated), and lifestyle modification (Mediterranean diet, daily exercise, nicotine/drug cessation & limiting alcohol use). Discussed s/sx of stroke and to call 911 immediately.?           Sugey Tabares, APRN-CNP    I did examine patient and physical exam as well as neuroexam.   Constitutional: General appearance: no acute distress   Auscultation of Heart: Regular rate and rhythm, no murmurs, normal S1 and S2.   Carotid Arteries: Intact without any bruits.   Neck is supple.   No lymph adenopathy.   Peripheral Vascular Exam: Pulses +2 and equal in all extremities. No swelling, varicosities, edema or tenderness to palpations.    Abdomen is soft, nondistended. No organomegaly.  Mental status: The patient was in no distress, alert, interactive and cooperative. Affect is appropriate.   Orientation: oriented to person, oriented to place and oriented to time.   Memory: recent memory intact and remote memory intact.   Attention: normal attention span and normal concentrating ability.   Language: normal comprehension and no difficulty naming common objects.   Fund of knowledge: Patient displays adequate knowledge of current events, adequate fund of knowledge regarding past history and adequate fund of knowledge regarding vocabulary.   Eyes: The ophthalmoscopic examination was normal. The fundi are visualized with normal disc margins and  without.  Cranial nerve II: Visual fields full to confrontation.   Cranial nerves III, IV, and VI: Pupils round, equally reactive to light; no ptosis. EOMs intact. No nystagmus.   Cranial Nerve V: Facial sensation intact bilaterally.   Cranial nerve VII: Normal and symmetric facial strength.   Cranial nerve VIII: Hearing is intact bilaterally to finger rub / whisper.   Cranial nerves IX and X: Palate elevates symmetrically.   Cranial nerve XI: Shoulder shrug and neck rotation strength are intact.   Cranial nerve XII: Tongue midline with normal strength.   Motor: Motor exam was normal. Muscle bulk was normal in both upper and lower extremities. Muscle tone was normal in both upper and lower extremities. Muscle strength was 5/5 throughout. no abnormal or adventitious movements were present.   Deep Tendon Reflexes: left biceps 2+ , right biceps 2+, left triceps 2+, right triceps 2+, left brachioradialis 2+, right brachioradialis 2+, left patella 2+, right patella 2+, left ankle jerk 2+, right ankle jerk 2+   Plantar Reflex: Toes downgoing to plantar stimulation on the left. Toes downgoing to plantar stimulation on the right.   Sensory Exam: Normal to light touch.   Coordination: There is no limb dystaxia and rapid alternating movements are intact.  Gait: Gait is normal without spasticity, ataxia or bradykinesia. Stance is stable with a negative Romberg.    Patient was advised to quit using cocaine signs symptoms and risk factor of CVA were discussed.  Since patient is LDL was less than 70 we will not do statin and discharged home on aspirin.  Signs symptoms of severe risk factor benefit bleeding risk and the precautions were discussed at great length.    Due to technical limitations of voice recognition and human error, this note may not accurately reflect the care of the patient.

## 2023-12-06 NOTE — ED PROVIDER NOTES
History/Exam limitations: none.   Additional history was obtained from patient.    HPI:       Jean-Pierre Stubbs is a 43 y.o. with a history of untreated hypertension, intermittent drug use with cocaine, possible sickle cell trait or thalassemia per his history who presents today with new onset of tingling in his left arm.  Patient was at work today when he developed symptoms of tingling in the upper arm and cold feet.  He did not feel well and told his employer.  His blood pressure was reportedly high.  EMS was called and patient was sent in.  Based on the symptomatology patient was determined to be positive for Simpson screen although he was Van negative.  Patient does admit he used cocaine yesterday.  I present time he is not having any visual disturbance although has felt pressure behind his eyes.  He denies any difficulty with speech or swallowing.  He denies any motor weakness to the upper or lower extremities.  He continues to feel tingling down his left arm.  He denies chest pain or shortness of breath..       Last Known Well Time: 9:15 AM        ------------------------------------------------------------------------------------------------------------------------------------------     Physical Exam:    ED Triage Vitals [12/06/23 1005]   Temp Heart Rate Resp BP   37 °C (98.6 °F) 90 20 (!) 175/105      SpO2 Temp Source Heart Rate Source Patient Position   100 % Temporal Monitor --      BP Location FiO2 (%)     -- --          Gen: Not in acute distress  Head/Neck: NCAT  Eyes: Anicteric sclerae, noninjected conjunctivae  Nose: No rhinorrhea  Mouth:  MMM  Heart: Regular rate and rhythm w/ no murmurs, rubs, gallops  Lungs: CTAB w/o wheezes, rales, rhonchi, no increased work of breathing  Abdomen: Soft, NT/ND  Musculoskeletal: No deformities  Extremities: No edema.  Neurologic: Please see below for NIHSS  Skin: No rashes noted  Psychological: Calm        Interval: Baseline  Time: 11:47 AM  Person Administering  Scale: Nedra Daniels MD     1a  Level of consciousness: 0=alert; keenly responsive   1b. LOC questions:  0=Performs both tasks correctly   1c. LOC commands: 0=Performs both tasks correctly   2.  Best Gaze: 0=normal   3. Visual: 0=No visual loss   4. Facial Palsy: 0=Normal symmetric movement   5a. Motor left arm: 0=No drift, limb holds 90 (or 45) degrees for full 10 seconds   5b.  Motor right arm: 0=No drift, limb holds 90 (or 45) degrees for full 10 seconds   6a. motor left le=No drift, limb holds 90 (or 45) degrees for full 10 seconds   6b  Motor right le=No drift, limb holds 90 (or 45) degrees for full 10 seconds   7. Limb Ataxia: 0=Absent   8.  Sensory: 0=Normal; no sensory loss   9. Best Language:  0=No aphasia, normal   10. Dysarthria: 0=No abnormality   11. Extinction and Inattention: 0=No abnormality   12. Distal motor function: 0=Normal     Total:   0         VAN: VAN: Negative     ------------------------------------------------------------------------------------------------------------------------------------------     Medical Decision Making:     ED Course as of 23 1245   Wed Dec 06, 2023   1046 [facility] CT findings with radiology.  Reviewed labs. [ES]   1215 [facility] case with Dr. Kendall .  Patient will be admitted for further evaluation and management.  Was called to the bedside to assess the patient's facial expressions.  Nurse concern left side was drooping.  On exam patient had full motor function of the forehead and lower face bilaterally.  It is a mild hypesthesia to the left face but no complete loss of sensation.  We will continue to monitor. [ES]   1233 And ambulated to the bathroom without difficulty.  Reassessed patient's face.  Left lower lip slightly drooping although remainder of face has good motor function.  No change [ES]   1301 Reviewed. [ES]      ED Course User Index  [ES] Nedra Daniels MD         Diagnoses as of 23 1245   Hypertensive urgency   Paresthesia  of left arm   Cocaine use        EKG interpreted by myself: EKG reveals sinus rhythm at a rate of 64 bpm, normal axis, normal intervals, no acute ST or T wave segment change indicate acute coronary syndrome.     Independent Interpretation of Studies: I independently interpreted the CT head and see No obvious evidence of intracranial hemorrhage    Chronic Medical Conditions Significantly Affecting Care: Possible thalassemia versus sickle cell trait.  Hypertension    Social Determinants of Health Significantly Affecting Care:  Cocaine use     External Records Reviewed: I reviewed recent and relevant outside records including: External records do not have information regarding hematologic disease.     Discussion of Management with Other Providers:   I discussed the patient/results with: the admitting team.  At this time I am less concerned for stroke however patient may have had a TIA due to vasospasm, hypertensive urgency and likely related to underlying cocaine use yesterday.      IV Thrombolysis:    No Thrombolysis contraindication reason: Working diagnosis is NOT a suspected ischemic stroke    Labs Reviewed   CBC WITH AUTO DIFFERENTIAL - Abnormal       Result Value    WBC 9.1      nRBC 0.0      RBC 5.69      Hemoglobin 14.6      Hematocrit 45.7      MCV 80      MCH 25.7 (*)     MCHC 31.9 (*)     RDW 15.1 (*)     Platelets 260      Neutrophils % 66.9      Immature Granulocytes %, Automated 0.2      Lymphocytes % 24.6      Monocytes % 7.7      Eosinophils % 0.3      Basophils % 0.3      Neutrophils Absolute 6.04      Immature Granulocytes Absolute, Automated 0.02      Lymphocytes Absolute 2.23      Monocytes Absolute 0.70      Eosinophils Absolute 0.03      Basophils Absolute 0.03     COMPREHENSIVE METABOLIC PANEL - Abnormal    Glucose 100 (*)     Sodium 137      Potassium 3.7      Chloride 99      Bicarbonate 29      Anion Gap 13      Urea Nitrogen 11      Creatinine 0.90      eGFR >90      Calcium 9.8      Albumin  4.6      Alkaline Phosphatase 67      Total Protein 7.4      AST 18      Bilirubin, Total 0.5      ALT 22     TROPONIN I, HIGH SENSITIVITY - Normal    Troponin I, High Sensitivity 5      Narrative:     Less than 99th percentile of normal range cutoff-  Female and children under 18 years old <14 ng/L; Male <21 ng/L: Negative  Repeat testing should be performed if clinically indicated.     Female and children under 18 years old 14-50 ng/L; Male 21-50 ng/L:  Consistent with possible cardiac damage and possible increased clinical   risk. Serial measurements may help to assess extent of myocardial damage.     >50 ng/L: Consistent with cardiac damage, increased clinical risk and  myocardial infarction. Serial measurements may help assess extent of   myocardial damage.      NOTE: Children less than 1 year old may have higher baseline troponin   levels and results should be interpreted in conjunction with the overall   clinical context.     NOTE: Troponin I testing is performed using a different   testing methodology at [facility] than at other   [facility]. Direct result comparisons should only   be made within the same method.   PROTIME-INR - Normal    Protime 12.8      INR 1.1     APTT - Normal    aPTT 31      Narrative:     The APTT is no longer used for monitoring Unfractionated Heparin Therapy. For monitoring Heparin Therapy, use the Heparin Assay.   RETICULOCYTES - Normal    Retic % 1.3      Retic Absolute 0.073      Reticulocyte Hemoglobin 28      Immature Retic fraction 9.8     LACTATE DEHYDROGENASE - Normal         DRUG SCREEN,URINE   GREEN TOP   GRAY TOP   POCT GLUCOSE METER        CT brain attack head wo IV contrast   Final Result   No acute intracranial hemorrhage, mass effect, or CT apparent acute   infarct.        MACRO:   Eagle Campos discussed the significance and urgency of this critical   finding by telephone with  DEL FARRELL on 12/6/2023 at 10:37 am.   (**-RCF-**)  Findings:  See findings.                  Signed by: Eagle Campos 12/6/2023 10:38 AM   Dictation workstation:   [id number]          Procedure  Procedures     ED course: Patient was initially a brain attack activation.  He went to directly to CT for imaging.  I was able to perform an NIHSS scale.  Patient's initial stroke score was 0.  His paresthesias seemed subjective and not objective on exam.  Blood pressure was elevated at 177/105.  At rest patient's blood pressure trended down with the lowest being 124/54.  EKG was unremarkable.  Laboratory evaluation was reassuring.  Drug screen is still pending.  CT scan showed no evidence of intracranial hemorrhage or obvious stroke or mass.  I do not feel the patient requires CT angiogram.  I reviewed the chart to confirm his hematologic status although was unable to find documentation regarding sickle cell disease.  Per his report he may have thalassemia versus sickle cell trait.    At this time I feel the working diagnosis includes hypertensive urgency which has now resolved but likely contributed to the paresthesia..  It paresthesia of the left arm which may have been related to vasospasm, TIA or his cocaine use.  I recommend admitting the patient for observation and further work-up.  We consulted the patient regarding his desire to be transferred to [facility] which is in network for his insurance.  The patient is able to stay here and felt that he would rather stay here at [facility].    Stroke protocol stand down at 1050    He was admitted in stable improved condition.    Diagnosis  1. Hypertensive urgency        2. Paresthesia of left arm        3. Cocaine use              Nedra Daniels MD  12/06/23 1154       Nedra Daniels MD  12/06/23 1158       Nedra Daniels MD  12/14/23 1246

## 2023-12-06 NOTE — H&P
History Of Present Illness  Jean-Pierre Stubbs is a 43 y.o. male presenting with Who presented to the ER with strokelike symptoms.  Patient has a history of hypertension, intermittent drug use with cocaine, last use was day before yesterday, thalassemia carrier who presented to the ER with strokelike symptoms.  Patient says that he was at work when he started feeling numbness of his left lower extremity and left arm and then involve the left side of the face,, he did notice some loss of  strength.  He was at work when this happened he told his employer and was sent to the ER.  In the ER he was noticed to have mild left-sided facial droop which is new for him.  I can order I can notice little bit of slurring of speech as well.  He denies any trouble swallowing, denies any trouble breathing.  Denies any previous history of stroke, denies any similar symptoms.  Tingling has improved but he still notices a lack of sensation on the left side of his body including the face..  He was hypertensive at presentation with systolic blood pressure in the 160s and 170s.     Past Medical History  Hypertension    Surgical History  No past surgical history on file.     Social History  Smokes intermittently, no regular alcohol use, intermittent cocaine use    Family History  Positive for stroke in mother     Allergies  Patient has no known allergies.    Review of Systems  As per HPI, comprehensive review of systems performed  Physical Exam  Constitutional:       Appearance: He is obese. He is not toxic-appearing.   HENT:      Head: Normocephalic and atraumatic.      Mouth/Throat:      Mouth: Mucous membranes are moist.   Eyes:      Extraocular Movements: Extraocular movements intact.      Conjunctiva/sclera: Conjunctivae normal.      Pupils: Pupils are equal, round, and reactive to light.   Cardiovascular:      Rate and Rhythm: Normal rate and regular rhythm.      Pulses: Normal pulses.   Pulmonary:      Effort: Pulmonary effort is  "normal.      Breath sounds: Normal breath sounds.   Abdominal:      General: There is no distension.      Palpations: Abdomen is soft.   Musculoskeletal:      Cervical back: Neck supple.   Neurological:      Mental Status: He is alert.      Comments: He has mild left-sided facial droop, decreased sensation on the left arm and left side of the face,  strength is 4/5, power is 4/5 in the left lower extremity          Last Recorded Vitals  Blood pressure (!) 169/97, pulse 68, temperature 36.7 °C (98.1 °F), resp. rate 18, height 1.753 m (5' 9\"), weight 95 kg (209 lb 7 oz), SpO2 95 %.    Relevant Results             Assessment/Plan   Principal Problem:    TIA (transient ischemic attack)      43-year-old male with past medical history of hypertension and cocaine abuse admitted with CVA    I do believe the patient has a small CVA going by his neuroexam  He has been started on aspirin, I will add statin  Check a lipid panel, hemoglobin A1c level  Order an MRI of the brain  Carotid Dopplers, echocardiogram  Neurochecks per protocol, observe on telemetry  Neurology consult  Allow for permissive hypertension  PT OT eval  DVT prophylaxis  He was counseled extensively regarding smoking and cocaine use             Varghese Kendall MD    "

## 2023-12-07 VITALS
BODY MASS INDEX: 31.02 KG/M2 | TEMPERATURE: 97.5 F | HEIGHT: 69 IN | HEART RATE: 68 BPM | SYSTOLIC BLOOD PRESSURE: 155 MMHG | RESPIRATION RATE: 18 BRPM | WEIGHT: 209.44 LBS | OXYGEN SATURATION: 91 % | DIASTOLIC BLOOD PRESSURE: 86 MMHG

## 2023-12-07 LAB
EST. AVERAGE GLUCOSE BLD GHB EST-MCNC: 105 MG/DL
GLUCOSE BLD MANUAL STRIP-MCNC: 112 MG/DL (ref 74–99)
GLUCOSE BLD MANUAL STRIP-MCNC: 121 MG/DL (ref 74–99)
HBA1C MFR BLD: 5.3 %

## 2023-12-07 PROCEDURE — 97161 PT EVAL LOW COMPLEX 20 MIN: CPT | Mod: GP

## 2023-12-07 PROCEDURE — 97165 OT EVAL LOW COMPLEX 30 MIN: CPT | Mod: GO

## 2023-12-07 PROCEDURE — 2500000001 HC RX 250 WO HCPCS SELF ADMINISTERED DRUGS (ALT 637 FOR MEDICARE OP): Performed by: STUDENT IN AN ORGANIZED HEALTH CARE EDUCATION/TRAINING PROGRAM

## 2023-12-07 PROCEDURE — 82947 ASSAY GLUCOSE BLOOD QUANT: CPT

## 2023-12-07 PROCEDURE — 99223 1ST HOSP IP/OBS HIGH 75: CPT | Performed by: PSYCHIATRY & NEUROLOGY

## 2023-12-07 PROCEDURE — G0378 HOSPITAL OBSERVATION PER HR: HCPCS

## 2023-12-07 PROCEDURE — 99239 HOSP IP/OBS DSCHRG MGMT >30: CPT | Performed by: STUDENT IN AN ORGANIZED HEALTH CARE EDUCATION/TRAINING PROGRAM

## 2023-12-07 RX ORDER — NAPROXEN SODIUM 220 MG/1
81 TABLET, FILM COATED ORAL DAILY
Qty: 30 TABLET | Refills: 0 | Status: SHIPPED | OUTPATIENT
Start: 2023-12-08 | End: 2024-01-07

## 2023-12-07 RX ORDER — AMLODIPINE BESYLATE 5 MG/1
5 TABLET ORAL DAILY
Qty: 30 TABLET | Refills: 0 | Status: SHIPPED | OUTPATIENT
Start: 2023-12-07 | End: 2024-03-21 | Stop reason: HOSPADM

## 2023-12-07 RX ADMIN — ASPIRIN 81 MG: 81 TABLET, CHEWABLE ORAL at 08:01

## 2023-12-07 ASSESSMENT — COGNITIVE AND FUNCTIONAL STATUS - GENERAL
MOBILITY SCORE: 24
DAILY ACTIVITIY SCORE: 24
MOBILITY SCORE: 24
DAILY ACTIVITIY SCORE: 24

## 2023-12-07 ASSESSMENT — PAIN - FUNCTIONAL ASSESSMENT
PAIN_FUNCTIONAL_ASSESSMENT: 0-10
PAIN_FUNCTIONAL_ASSESSMENT: 0-10

## 2023-12-07 ASSESSMENT — ACTIVITIES OF DAILY LIVING (ADL)
BATHING_ASSISTANCE: MODIFIED INDEPENDENT (DEVICE)
ADL_ASSISTANCE: INDEPENDENT
ADL_ASSISTANCE: INDEPENDENT

## 2023-12-07 ASSESSMENT — PAIN SCALES - GENERAL
PAINLEVEL_OUTOF10: 0 - NO PAIN

## 2023-12-07 NOTE — DISCHARGE SUMMARY
Discharge Diagnosis  TIA (transient ischemic attack)    Issues Requiring Follow-Up      Test Results Pending At Discharge  Pending Labs       No current pending labs.            Hospital Course   43-year-old male with past medical history of hypertension, intermittent drug use of cocaine who presented with strokelike symptoms.  Workup including echocardiogram, carotid Doppler and MRI of the brain has been negative.  Symptoms have improved.  He most likely had TIA which was cocaine induced.  He was counseled extensively regarding smoking and cocaine abuse going forward.  Will add amlodipine low-dose for intermittently high blood pressure.  Will also advise him to take a baby aspirin.  Lipid panel was within normal limits will avoid statin at this time.  He will be following up with primary care.    Pertinent Physical Exam At Time of Discharge  Physical Exam  Constitutional:       Appearance: Normal appearance.   HENT:      Head: Normocephalic and atraumatic.      Mouth/Throat:      Mouth: Mucous membranes are moist.   Eyes:      Extraocular Movements: Extraocular movements intact.      Conjunctiva/sclera: Conjunctivae normal.   Cardiovascular:      Rate and Rhythm: Normal rate and regular rhythm.      Heart sounds: No murmur heard.  Pulmonary:      Effort: Respiratory distress present.      Breath sounds: Wheezing present.   Abdominal:      Palpations: Abdomen is soft.      Tenderness: There is no abdominal tenderness.   Musculoskeletal:         General: No deformity.   Neurological:      Mental Status: He is alert.      Comments: Mild facial droop on the left side   Psychiatric:         Mood and Affect: Mood normal.         Home Medications     Medication List      You have not been prescribed any medications.       Outpatient Follow-Up  No future appointments.    Varghese Kendall MD

## 2023-12-07 NOTE — CARE PLAN
The patient's goals for the shift include  adequate sleep    The clinical goals for the shift include pt to remain free from injury      Problem: Fall/Injury  Goal: Not fall by end of shift  Outcome: Progressing     Problem: Pain - Adult  Goal: Verbalizes/displays adequate comfort level or baseline comfort level  Outcome: Progressing     Problem: Safety - Adult  Goal: Free from fall injury  Outcome: Progressing     Problem: Recent hospitalization or complication while living with CVA  Goal: Patient will effectively self-manage their CHF disease process  Outcome: Progressing

## 2023-12-07 NOTE — PROGRESS NOTES
12/07/23 1231   Discharge Planning   Living Arrangements Spouse/significant other;Children   Support Systems Spouse/significant other;Children   Assistance Needed none PTA   Type of Residence Private residence   Home or Post Acute Services None   Patient expects to be discharged to: HOME   Does the patient need discharge transport arranged? No     Pt confirmed he does not have a PCP. [facility] provided choice PCP list.     HOME no needs.

## 2023-12-07 NOTE — PROGRESS NOTES
Physical Therapy    Physical Therapy Evaluation    Patient Name: Jean-Pierre Stubbs  MRN: [id number]  Today's Date: 12/7/2023   Time Calculation  Start Time: 1014  Stop Time: 1028  Time Calculation (min): 14 min    Assessment/Plan   PT Assessment  Evaluation/Treatment Tolerance: Patient tolerated treatment well  End of Session Communication: Bedside nurse, Care Coordinator  Assessment Comment: PT eval only pts symptoms resloving . pt ind with tranfers and gait  no PT needs at this time.  End of Session Patient Position: Bed, 3 rail up, Alarm off, not on at start of session  IP OR SWING BED PT PLAN  Inpatient or Swing Bed: Inpatient  PT Plan  PT Plan: PT Eval only  PT Frequency: PT eval only  PT Recommended Transfer Status: Independent  PT - OK to Discharge: Yes    Subjective     Current Problem:  Patient Active Problem List   Diagnosis    TIA (transient ischemic attack)     General Visit Information:  General  Reason for Referral: impaired mobility  Referred By: PT .OT  12/6 Yoana  Past Medical History Relevant to Rehab: HTN, intermittent drug use of cocaine  Prior to Session Communication: Bedside nurse  Patient Position Received: Bed, 3 rail up, Alarm off, not on at start of session  General Comment: 43-year-old male to ED with c/o strokelike symptoms. Workup including echocardiogram, carotid Doppler and MRI of the brain has been negative. Symptoms have improved. He most likely had TIA which was cocaine induced.    Home Living:  Home Living  Home Living Comments: Lives with girlfriend; 1 story home with basement; pt resides in basement; walk in shower, denies DME / AE; standard commode.    Prior Level of Function:  Prior Function Per Pt/Caregiver Report  Level of Ware: Independent with ADLs and functional transfers, Independent with homemaking with ambulation  ADL Assistance: Independent  Homemaking Assistance: Independent  Ambulatory Assistance: Independent  Vocational: Full time employment  Prior Function  Comments: ind with all mobility adls and iadls , drives , works full time care detailing    Precautions:  Precautions  Medical Precautions: Fall precautions    Objective     Pain:  Pain Assessment  Pain Assessment: 0-10  Pain Score: 0 - No pain    Cognition:  Cognition  Overall Cognitive Status: Within Functional Limits  Orientation Level: Oriented X4  Attention: Within Functional Limits  Memory: Within Funtional Limits  Problem Solving: Within Functional Limits  Numeric Reasoning: Within Functional Limits  Abstract Reasoning: Within Functional Limits  Safety/Judgement: Within Functional Limits  Processing Speed: Within funtional limits    General Assessments:     Sensation  Light Touch: Partial deficits in the LUE  Sensation Comment: Mild decreased sensation throughout LUE; intact to touch and temp although diminished compared to RUE.     Perception  Inattention/Neglect: Appears intact  Motor Planning: Appears intact  Coordination  Finger to Nose: Intact  Finger to Target: Intact  Coordination Comment: mild delay     Dynamic Sitting Balance  Dynamic Sitting-Comments: good  Dynamic Standing Balance  Dynamic Standing-Comments: good    Functional Assessments:     Bed Mobility  Bed Mobility: Yes  Bed Mobility 1  Bed Mobility 1: Supine to sitting, Scooting  Level of Assistance 1: Independent  Transfers  Transfer: Yes  Transfer 1  Technique 1: Sit to stand, Stand to sit  Transfer Level of Assistance 1: Independent  Ambulation/Gait Training  Ambulation/Gait Training Performed: Yes  Ambulation/Gait Training 1  Surface 1: Level tile  Device 1: No device  Assistance 1: Independent  Quality of Gait 1:  (slow gait speed.  decreased stride .)  Comments/Distance (ft) 1: 30 ft x 2 no AD ind    Extremity/Trunk Assessments:    RLE   RLE : Within Functional Limits  LLE   LLE : Within Functional Limits    Outcome Measures:  [facility] Basic Mobility  Turning from your back to your side while in a flat bed without using bedrails:  None  Moving from lying on your back to sitting on the side of a flat bed without using bedrails: None  Moving to and from bed to chair (including a wheelchair): None  Standing up from a chair using your arms (e.g. wheelchair or bedside chair): None  To walk in hospital room: None  Climbing 3-5 steps with railing: None  Basic Mobility - Total Score: 24

## 2023-12-07 NOTE — PROGRESS NOTES
Occupational Therapy    Evaluation    Patient Name: Jean-Pierre Stubbs  MRN: [id number]  Today's Date: 12/7/2023  Time Calculation  Start Time: 1013  Stop Time: 1029  Time Calculation (min): 16 min      Assessment:  Evaluation/Treatment Tolerance: Patient tolerated treatment well  Medical Staff Made Aware: Yes  End of Session Communication: Bedside nurse, Care Coordinator  End of Session Patient Position: Bed, 3 rail up, Alarm off, not on at start of session  OT Assessment Results: Decreased upper extremity strength, Decreased sensation    Plan:  OT Frequency: One time visit  OT Discharge Recommendations: No further acute OT  OT - OK to Discharge: Yes (when medically appropriate)   OT eval and treatment / education completed. No further skilled OT indicated at this time. Pt verbalizes confidence returning home and resuming self care tasks.       Subjective   Current Problem:  1. Hypertensive urgency  Transthoracic Echo (TTE) Complete    Carotid duplex bilateral    Transthoracic Echo (TTE) Complete    Carotid duplex bilateral    amLODIPine (Norvasc) 5 mg tablet      2. Paresthesia of left arm        3. Cocaine use        4. Essential (primary) hypertension  Transthoracic Echo (TTE) Complete      5. TIA (transient ischemic attack)  aspirin 81 mg chewable tablet        General:  General  Reason for Referral: Decline in self care performance;  43-year-old male to ED with c/o strokelike symptoms.  Workup including echocardiogram, carotid Doppler and MRI of the brain has been negative.  Symptoms have improved.  He most likely had TIA which was cocaine induced.  Referred By: Evelyne: PT/OT  Past Medical History Relevant to Rehab: HTN, intermittent drug use of cocaine  Prior to Session Communication: Bedside nurse  Patient Position Received: Bed, 3 rail up, Alarm off, not on at start of session  General Comment: Pt pleasant and cooperative; agreeable to OT; cleared to participtate by nursing; pt reports symptoms improving since  yesterday admit.  Precautions:  Medical Precautions: Fall precautions     Pain:  Pain Assessment  Pain Assessment: 0-10  Pain Score: 0 - No pain    Objective   Cognition:  Overall Cognitive Status: Within Functional Limits  Orientation Level: Oriented X4  Attention: Within Functional Limits  Memory: Within Funtional Limits  Problem Solving: Within Functional Limits  Abstract Reasoning: Within Functional Limits  Safety/Judgement: Within Functional Limits  Processing Speed: Within funtional limits         Home Living:  Home Living Comments: Lives with girlfriend; 1 story home with basement; pt resides in basement; walk in shower, denies DME / AE; standard commode.    Prior Function:  Level of [place]: Independent with ADLs and functional transfers, Independent with homemaking with ambulation  ADL Assistance: Independent  Homemaking Assistance: Independent  Ambulatory Assistance: Independent  Vocational: Full time employment ()     ADL:  Eating Assistance: Independent  Grooming Assistance: Independent  Bathing Assistance: Modified independent   UE Dressing Assistance: Modified independent   LE Dressing Assistance: Modified independent   Toileting Assistance with Device: Modified independent  Functional Assistance: Modified independent  ADL Comments: increased time  Activity Tolerance:  Endurance: Endurance does not limit participation in activity  Bed Mobility/Transfers: Bed Mobility  Bed Mobility: Yes  Bed Mobility 1  Bed Mobility 1: Supine to sitting, Sitting to supine  Level of Assistance 1: Modified independent    Transfers  Transfer: Yes  Transfer 1  Transfer From 1: Bed to, Sit to, Stand to, Toilet to, Chair with arms to  Transfer Level of Assistance 1: Modified independent    Sitting Balance:   good  Standing Balance:    Good / fair+     Vision:  Vision - Basic Assessment  Patient Visual Report: Other (Comment) (Reports some burring of vision; improved from yesterday)  Sensation:  Light Touch:  Partial deficits in the LUE  Proprioception: No apparent deficits  Sensation Comment: Mild decreased sensation throughout LUE; intact to touch and temp although diminished compared to RUE.     Perception:  Inattention/Neglect: Appears intact  Motor Planning: Appears intact  Coordination:  Finger to Nose: Intact  Finger to Target: Intact  Coordination Comment: mild delay   Hand Function:  Gross Grasp: Functional  Coordination: Functional  Extremities: RUE   RUE AROM: Within Functional Limits  RUE Overall Strength: Within Functional Limits - strength 5/5   LUE AROM: Within Functional Limits  LUE Overall Strength:  LUE weakness; MMT 4-/5; able to perform ADL tasks with strength; pt reports L elbow surgery about 3 yrs ago and decreased strength however new onset of weakness throughout LUE with this admit. Pt is R handed.    Outcome Measures:  [facility] Daily Activity  Putting on and taking off regular lower body clothing: None  Bathing (including washing, rinsing, drying): None  Putting on and taking off regular upper body clothing: None  Toileting, which includes using toilet, bedpan or urinal: None  Taking care of personal grooming such as brushing teeth: None  Eating Meals: None  Daily Activity - Total Score: 24    Education Documentation  ADL Training, taught by Scarlett Andrade OT at 12/7/2023 12:29 PM.  Learner: Patient  Readiness: Acceptance  Method: Explanation  Response: Verbalizes Understanding    LUE Strengthening and Coordination Exercises, taught by Scarlett Andrade OT at 12/7/2023 12:29 PM.  Learner: Patient  Readiness: Acceptance  Method: Explanation  Response: Verbalizes Understanding    Goals:  Pt demonstrate improved ADL task performance and safe functional transfers for safe discharge to least restrictive environment.  Pt goal: To go home.

## 2024-02-13 ENCOUNTER — APPOINTMENT (OUTPATIENT)
Dept: CARDIOLOGY | Facility: HOSPITAL | Age: 44
End: 2024-02-13
Payer: COMMERCIAL

## 2024-02-13 ENCOUNTER — APPOINTMENT (OUTPATIENT)
Dept: RADIOLOGY | Facility: HOSPITAL | Age: 44
End: 2024-02-13
Payer: COMMERCIAL

## 2024-02-13 ENCOUNTER — HOSPITAL ENCOUNTER (OUTPATIENT)
Facility: HOSPITAL | Age: 44
Setting detail: OBSERVATION
Discharge: OTHER INSTITUTION | End: 2024-02-14
Attending: STUDENT IN AN ORGANIZED HEALTH CARE EDUCATION/TRAINING PROGRAM | Admitting: STUDENT IN AN ORGANIZED HEALTH CARE EDUCATION/TRAINING PROGRAM
Payer: COMMERCIAL

## 2024-02-13 DIAGNOSIS — F25.9 SCHIZOAFFECTIVE DISORDER, UNSPECIFIED TYPE (MULTI): Primary | ICD-10-CM

## 2024-02-13 PROBLEM — T50.902A DRUG OVERDOSE, INTENTIONAL SELF-HARM, INITIAL ENCOUNTER (MULTI): Status: ACTIVE | Noted: 2024-02-13

## 2024-02-13 PROBLEM — F41.9 ANXIETY AND DEPRESSION: Status: ACTIVE | Noted: 2017-07-07

## 2024-02-13 PROBLEM — T50.902S: Status: ACTIVE | Noted: 2024-02-13

## 2024-02-13 PROBLEM — F32.A ANXIETY AND DEPRESSION: Status: ACTIVE | Noted: 2017-07-07

## 2024-02-13 LAB
ALBUMIN SERPL BCP-MCNC: 4.3 G/DL (ref 3.4–5)
ALP SERPL-CCNC: 52 U/L (ref 33–120)
ALT SERPL W P-5'-P-CCNC: 17 U/L (ref 10–52)
AMPHETAMINES UR QL SCN: ABNORMAL
ANION GAP SERPL CALC-SCNC: 12 MMOL/L (ref 10–20)
APAP SERPL-MCNC: <10 UG/ML
APPEARANCE UR: CLEAR
AST SERPL W P-5'-P-CCNC: 15 U/L (ref 9–39)
BARBITURATES UR QL SCN: ABNORMAL
BASE EXCESS BLDV CALC-SCNC: 5.1 MMOL/L (ref -2–3)
BASOPHILS # BLD AUTO: 0.03 X10*3/UL (ref 0–0.1)
BASOPHILS NFR BLD AUTO: 0.5 %
BENZODIAZ UR QL SCN: ABNORMAL
BILIRUB SERPL-MCNC: 0.7 MG/DL (ref 0–1.2)
BILIRUB UR STRIP.AUTO-MCNC: NEGATIVE MG/DL
BODY TEMPERATURE: ABNORMAL
BUN SERPL-MCNC: 10 MG/DL (ref 6–23)
BZE UR QL SCN: ABNORMAL
CALCIUM SERPL-MCNC: 9.5 MG/DL (ref 8.6–10.3)
CANNABINOIDS UR QL SCN: ABNORMAL
CHLORIDE SERPL-SCNC: 105 MMOL/L (ref 98–107)
CK SERPL-CCNC: 139 U/L (ref 0–325)
CO2 SERPL-SCNC: 28 MMOL/L (ref 21–32)
COLOR UR: NORMAL
CREAT SERPL-MCNC: 0.77 MG/DL (ref 0.5–1.3)
EGFRCR SERPLBLD CKD-EPI 2021: >90 ML/MIN/1.73M*2
EOSINOPHIL # BLD AUTO: 0.06 X10*3/UL (ref 0–0.7)
EOSINOPHIL NFR BLD AUTO: 1 %
ERYTHROCYTE [DISTWIDTH] IN BLOOD BY AUTOMATED COUNT: 15.1 % (ref 11.5–14.5)
ETHANOL SERPL-MCNC: <10 MG/DL
FENTANYL+NORFENTANYL UR QL SCN: ABNORMAL
FLUAV RNA RESP QL NAA+PROBE: NOT DETECTED
FLUBV RNA RESP QL NAA+PROBE: NOT DETECTED
GLUCOSE BLD MANUAL STRIP-MCNC: 101 MG/DL (ref 74–99)
GLUCOSE SERPL-MCNC: 88 MG/DL (ref 74–99)
GLUCOSE UR STRIP.AUTO-MCNC: NEGATIVE MG/DL
HCO3 BLDV-SCNC: 30.5 MMOL/L (ref 22–26)
HCT VFR BLD AUTO: 38.5 % (ref 41–52)
HGB BLD-MCNC: 12.4 G/DL (ref 13.5–17.5)
IMM GRANULOCYTES # BLD AUTO: 0.01 X10*3/UL (ref 0–0.7)
IMM GRANULOCYTES NFR BLD AUTO: 0.2 % (ref 0–0.9)
INHALED O2 CONCENTRATION: 100 %
KETONES UR STRIP.AUTO-MCNC: NEGATIVE MG/DL
LEUKOCYTE ESTERASE UR QL STRIP.AUTO: NEGATIVE
LYMPHOCYTES # BLD AUTO: 1.62 X10*3/UL (ref 1.2–4.8)
LYMPHOCYTES NFR BLD AUTO: 26.4 %
MAGNESIUM SERPL-MCNC: 2.12 MG/DL (ref 1.6–2.4)
MCH RBC QN AUTO: 25.8 PG (ref 26–34)
MCHC RBC AUTO-ENTMCNC: 32.2 G/DL (ref 32–36)
MCV RBC AUTO: 80 FL (ref 80–100)
MONOCYTES # BLD AUTO: 0.62 X10*3/UL (ref 0.1–1)
MONOCYTES NFR BLD AUTO: 10.1 %
NEUTROPHILS # BLD AUTO: 3.8 X10*3/UL (ref 1.2–7.7)
NEUTROPHILS NFR BLD AUTO: 61.8 %
NITRITE UR QL STRIP.AUTO: NEGATIVE
NRBC BLD-RTO: 0 /100 WBCS (ref 0–0)
OPIATES UR QL SCN: ABNORMAL
OXYCODONE+OXYMORPHONE UR QL SCN: ABNORMAL
OXYHGB MFR BLDV: 84.9 % (ref 45–75)
PCO2 BLDV: 47 MM HG (ref 41–51)
PCP UR QL SCN: ABNORMAL
PH BLDV: 7.42 PH (ref 7.33–7.43)
PH UR STRIP.AUTO: 6 [PH]
PLATELET # BLD AUTO: 223 X10*3/UL (ref 150–450)
PO2 BLDV: 53 MM HG (ref 35–45)
POTASSIUM SERPL-SCNC: 3.1 MMOL/L (ref 3.5–5.3)
PROT SERPL-MCNC: 6.9 G/DL (ref 6.4–8.2)
PROT UR STRIP.AUTO-MCNC: NEGATIVE MG/DL
RBC # BLD AUTO: 4.8 X10*6/UL (ref 4.5–5.9)
RBC # UR STRIP.AUTO: NEGATIVE /UL
SALICYLATES SERPL-MCNC: <3 MG/DL
SAO2 % BLDV: 89 % (ref 45–75)
SARS-COV-2 RNA RESP QL NAA+PROBE: NOT DETECTED
SODIUM SERPL-SCNC: 142 MMOL/L (ref 136–145)
SP GR UR STRIP.AUTO: 1
UROBILINOGEN UR STRIP.AUTO-MCNC: <2 MG/DL
WBC # BLD AUTO: 6.1 X10*3/UL (ref 4.4–11.3)

## 2024-02-13 PROCEDURE — 83735 ASSAY OF MAGNESIUM: CPT | Performed by: STUDENT IN AN ORGANIZED HEALTH CARE EDUCATION/TRAINING PROGRAM

## 2024-02-13 PROCEDURE — 70450 CT HEAD/BRAIN W/O DYE: CPT | Performed by: RADIOLOGY

## 2024-02-13 PROCEDURE — 80307 DRUG TEST PRSMV CHEM ANLYZR: CPT | Performed by: STUDENT IN AN ORGANIZED HEALTH CARE EDUCATION/TRAINING PROGRAM

## 2024-02-13 PROCEDURE — 93005 ELECTROCARDIOGRAM TRACING: CPT

## 2024-02-13 PROCEDURE — 82947 ASSAY GLUCOSE BLOOD QUANT: CPT

## 2024-02-13 PROCEDURE — G0378 HOSPITAL OBSERVATION PER HR: HCPCS

## 2024-02-13 PROCEDURE — 85025 COMPLETE CBC W/AUTO DIFF WBC: CPT | Performed by: STUDENT IN AN ORGANIZED HEALTH CARE EDUCATION/TRAINING PROGRAM

## 2024-02-13 PROCEDURE — 96365 THER/PROPH/DIAG IV INF INIT: CPT

## 2024-02-13 PROCEDURE — 99223 1ST HOSP IP/OBS HIGH 75: CPT | Performed by: NURSE PRACTITIONER

## 2024-02-13 PROCEDURE — 36415 COLL VENOUS BLD VENIPUNCTURE: CPT | Performed by: STUDENT IN AN ORGANIZED HEALTH CARE EDUCATION/TRAINING PROGRAM

## 2024-02-13 PROCEDURE — 2500000005 HC RX 250 GENERAL PHARMACY W/O HCPCS: Performed by: STUDENT IN AN ORGANIZED HEALTH CARE EDUCATION/TRAINING PROGRAM

## 2024-02-13 PROCEDURE — 81003 URINALYSIS AUTO W/O SCOPE: CPT | Performed by: STUDENT IN AN ORGANIZED HEALTH CARE EDUCATION/TRAINING PROGRAM

## 2024-02-13 PROCEDURE — 70450 CT HEAD/BRAIN W/O DYE: CPT

## 2024-02-13 PROCEDURE — 2500000004 HC RX 250 GENERAL PHARMACY W/ HCPCS (ALT 636 FOR OP/ED): Performed by: STUDENT IN AN ORGANIZED HEALTH CARE EDUCATION/TRAINING PROGRAM

## 2024-02-13 PROCEDURE — 82550 ASSAY OF CK (CPK): CPT | Performed by: STUDENT IN AN ORGANIZED HEALTH CARE EDUCATION/TRAINING PROGRAM

## 2024-02-13 PROCEDURE — 80143 DRUG ASSAY ACETAMINOPHEN: CPT | Performed by: STUDENT IN AN ORGANIZED HEALTH CARE EDUCATION/TRAINING PROGRAM

## 2024-02-13 PROCEDURE — 87636 SARSCOV2 & INF A&B AMP PRB: CPT | Performed by: STUDENT IN AN ORGANIZED HEALTH CARE EDUCATION/TRAINING PROGRAM

## 2024-02-13 PROCEDURE — 99285 EMERGENCY DEPT VISIT HI MDM: CPT | Mod: 25 | Performed by: STUDENT IN AN ORGANIZED HEALTH CARE EDUCATION/TRAINING PROGRAM

## 2024-02-13 PROCEDURE — 80053 COMPREHEN METABOLIC PANEL: CPT | Performed by: STUDENT IN AN ORGANIZED HEALTH CARE EDUCATION/TRAINING PROGRAM

## 2024-02-13 PROCEDURE — 82805 BLOOD GASES W/O2 SATURATION: CPT | Performed by: STUDENT IN AN ORGANIZED HEALTH CARE EDUCATION/TRAINING PROGRAM

## 2024-02-13 RX ORDER — ONDANSETRON HYDROCHLORIDE 2 MG/ML
4 INJECTION, SOLUTION INTRAVENOUS EVERY 8 HOURS PRN
Status: CANCELLED | OUTPATIENT
Start: 2024-02-13

## 2024-02-13 RX ORDER — POTASSIUM CHLORIDE 14.9 MG/ML
20 INJECTION INTRAVENOUS
Status: DISPENSED | OUTPATIENT
Start: 2024-02-13 | End: 2024-02-13

## 2024-02-13 RX ORDER — ACETAMINOPHEN 650 MG/1
650 SUPPOSITORY RECTAL EVERY 4 HOURS PRN
Status: CANCELLED | OUTPATIENT
Start: 2024-02-13

## 2024-02-13 RX ORDER — ACETAMINOPHEN 160 MG/5ML
650 SOLUTION ORAL EVERY 4 HOURS PRN
Status: CANCELLED | OUTPATIENT
Start: 2024-02-13

## 2024-02-13 RX ORDER — ONDANSETRON 4 MG/1
4 TABLET, FILM COATED ORAL EVERY 8 HOURS PRN
Status: CANCELLED | OUTPATIENT
Start: 2024-02-13

## 2024-02-13 RX ORDER — ACETAMINOPHEN 325 MG/1
650 TABLET ORAL EVERY 4 HOURS PRN
Status: CANCELLED | OUTPATIENT
Start: 2024-02-13

## 2024-02-13 RX ORDER — TALC
3 POWDER (GRAM) TOPICAL DAILY PRN
Status: CANCELLED | OUTPATIENT
Start: 2024-02-13

## 2024-02-13 RX ORDER — PANTOPRAZOLE SODIUM 40 MG/1
40 TABLET, DELAYED RELEASE ORAL
Status: CANCELLED | OUTPATIENT
Start: 2024-02-14

## 2024-02-13 RX ORDER — PANTOPRAZOLE SODIUM 40 MG/10ML
40 INJECTION, POWDER, LYOPHILIZED, FOR SOLUTION INTRAVENOUS
Status: CANCELLED | OUTPATIENT
Start: 2024-02-14

## 2024-02-13 RX ADMIN — Medication: at 15:10

## 2024-02-13 RX ADMIN — POTASSIUM CHLORIDE 20 MEQ: 14.9 INJECTION, SOLUTION INTRAVENOUS at 17:41

## 2024-02-13 SDOH — SOCIAL STABILITY: SOCIAL NETWORK: EMOTIONAL SUPPORT GIVEN: REASSURE

## 2024-02-13 SDOH — HEALTH STABILITY: MENTAL HEALTH: IN THE PAST WEEK, HAVE YOU BEEN HAVING THOUGHTS ABOUT KILLING YOURSELF?: NO

## 2024-02-13 SDOH — HEALTH STABILITY: MENTAL HEALTH: SUICIDE ASSESSMENT: ADULT (C-SSRS)

## 2024-02-13 SDOH — HEALTH STABILITY: MENTAL HEALTH: BEHAVIORS/MOOD: CALM;SLEEPING

## 2024-02-13 SDOH — HEALTH STABILITY: MENTAL HEALTH: ARE YOU HAVING THOUGHTS OF KILLING YOURSELF RIGHT NOW?: NO

## 2024-02-13 SDOH — ECONOMIC STABILITY: HOUSING INSECURITY: FEELS SAFE LIVING IN HOME: YES

## 2024-02-13 SDOH — HEALTH STABILITY: MENTAL HEALTH: NEEDS EXPRESSED: DENIES

## 2024-02-13 SDOH — HEALTH STABILITY: MENTAL HEALTH: HAVE YOU EVER DONE ANYTHING, STARTED TO DO ANYTHING, OR PREPARED TO DO ANYTHING TO END YOUR LIFE?: NO

## 2024-02-13 SDOH — HEALTH STABILITY: MENTAL HEALTH: DELUSIONS: OTHER (COMMENT)

## 2024-02-13 SDOH — HEALTH STABILITY: MENTAL HEALTH: DEPRESSION SYMPTOMS: FEELINGS OF HELPLESSNESS;FEELINGS OF HOPELESSESS;INCREASED IRRITABILITY;SLEEP DISTURBANCE

## 2024-02-13 SDOH — HEALTH STABILITY: MENTAL HEALTH: CONTENT: UNABLE TO ASSESS

## 2024-02-13 SDOH — HEALTH STABILITY: MENTAL HEALTH: IN THE PAST FEW WEEKS, HAVE YOU WISHED YOU WERE DEAD?: NO

## 2024-02-13 SDOH — HEALTH STABILITY: MENTAL HEALTH: ANXIETY SYMPTOMS: NO PROBLEMS REPORTED OR OBSERVED.

## 2024-02-13 SDOH — HEALTH STABILITY: MENTAL HEALTH: IN THE PAST FEW WEEKS, HAVE YOU FELT THAT YOU OR YOUR FAMILY WOULD BE BETTER OFF IF YOU WERE DEAD?: NO

## 2024-02-13 SDOH — SOCIAL STABILITY: SOCIAL NETWORK: PARENT/GUARDIAN/SIGNIFICANT OTHER INVOLVEMENT: ATTENTIVE TO PATIENT NEEDS

## 2024-02-13 SDOH — HEALTH STABILITY: MENTAL HEALTH: WISH TO BE DEAD (PAST 1 MONTH): NO

## 2024-02-13 SDOH — HEALTH STABILITY: MENTAL HEALTH: NON-SPECIFIC ACTIVE SUICIDAL THOUGHTS (PAST 1 MONTH): NO

## 2024-02-13 SDOH — SOCIAL STABILITY: SOCIAL INSECURITY: FAMILY BEHAVIORS: APPROPRIATE FOR SITUATION

## 2024-02-13 SDOH — HEALTH STABILITY: MENTAL HEALTH: SLEEP PATTERN: NAPS DURING THE DAY

## 2024-02-13 SDOH — HEALTH STABILITY: MENTAL HEALTH: HAVE YOU ACTUALLY HAD ANY THOUGHTS OF KILLING YOURSELF?: NO

## 2024-02-13 SDOH — HEALTH STABILITY: MENTAL HEALTH: SUICIDAL BEHAVIOR (LIFETIME): NO

## 2024-02-13 SDOH — HEALTH STABILITY: MENTAL HEALTH

## 2024-02-13 SDOH — SOCIAL STABILITY: SOCIAL NETWORK: VISITOR BEHAVIORS: APPROPRIATE FOR SITUATION

## 2024-02-13 SDOH — HEALTH STABILITY: MENTAL HEALTH: HAVE YOU WISHED YOU WERE DEAD OR WISHED YOU COULD GO TO SLEEP AND NOT WAKE UP?: NO

## 2024-02-13 SDOH — HEALTH STABILITY: MENTAL HEALTH: HAVE YOU EVER TRIED TO KILL YOURSELF?: NO

## 2024-02-13 ASSESSMENT — PAIN - FUNCTIONAL ASSESSMENT: PAIN_FUNCTIONAL_ASSESSMENT: 0-10

## 2024-02-13 ASSESSMENT — LIFESTYLE VARIABLES
SUBSTANCE_ABUSE_PAST_12_MONTHS: NO
HAVE YOU EVER FELT YOU SHOULD CUT DOWN ON YOUR DRINKING: NO
HAVE PEOPLE ANNOYED YOU BY CRITICIZING YOUR DRINKING: NO
PRESCIPTION_ABUSE_PAST_12_MONTHS: NO
EVER FELT BAD OR GUILTY ABOUT YOUR DRINKING: NO
EVER HAD A DRINK FIRST THING IN THE MORNING TO STEADY YOUR NERVES TO GET RID OF A HANGOVER: NO

## 2024-02-13 ASSESSMENT — COLUMBIA-SUICIDE SEVERITY RATING SCALE - C-SSRS
2. HAVE YOU ACTUALLY HAD ANY THOUGHTS OF KILLING YOURSELF?: NO
6. HAVE YOU EVER DONE ANYTHING, STARTED TO DO ANYTHING, OR PREPARED TO DO ANYTHING TO END YOUR LIFE?: NO
1. IN THE PAST MONTH, HAVE YOU WISHED YOU WERE DEAD OR WISHED YOU COULD GO TO SLEEP AND NOT WAKE UP?: NO

## 2024-02-13 ASSESSMENT — PAIN SCALES - GENERAL: PAINLEVEL_OUTOF10: 0 - NO PAIN

## 2024-02-13 NOTE — ED PROVIDER NOTES
"HPI   Chief Complaint   Patient presents with    Drug Overdose     Intentional overdose of unknown amount of Seroquel and doxepin. Pt. States he intentionally took the medication because \"I am depressed\"        Patient is a 43-year-old male with a history of anxiety and depression who presents to the emergency department for complaints of an overdose.  Patient intentionally took Seroquel and doxepin with questionable dose because he was \"sad\".  Patient is somewhat sleepy on evaluation but is responsive to verbal stimuli.  Patient is denying any pain or difficulty breathing.  Patient avoided answering questions regarding suicidal intent or homicidal ideations.  Patient denies auditory or visual hallucinations.      History provided by:  EMS personnel, patient and police   used: No                        [place] Coma Scale Score: 14                     Patient History   Past Medical History:   Diagnosis Date    Anxiety     Asthma     Chronic back pain     Depression     Diverticulitis     Fatty liver     IBS (irritable bowel syndrome)     TIA (transient ischemic attack)      History reviewed. No pertinent surgical history.  No family history on file.  Social History     Tobacco Use    Smoking status: Every Day     Types: Cigarettes    Smokeless tobacco: Current   Substance Use Topics    Alcohol use: Not on file    Drug use: Not on file       Physical Exam   ED Triage Vitals   Temperature Heart Rate Respirations BP   02/13/24 1458 02/13/24 1458 02/13/24 1458 02/13/24 1458   36 °C (96.8 °F) 74 18 142/83      Pulse Ox Temp src Heart Rate Source Patient Position   02/13/24 1458 -- 02/13/24 1645 --   99 %  Monitor       BP Location FiO2 (%)     -- --             Physical Exam  Vitals and nursing note reviewed.   Constitutional:       General: He is not in acute distress.     Appearance: He is not ill-appearing, toxic-appearing or diaphoretic.   HENT:      Head: Normocephalic and atraumatic.      Nose: " Nose normal.      Mouth/Throat:      Mouth: Mucous membranes are dry.      Pharynx: No oropharyngeal exudate or posterior oropharyngeal erythema.   Eyes:      General: No scleral icterus.     Extraocular Movements: Extraocular movements intact.      Pupils: Pupils are equal, round, and reactive to light.   Cardiovascular:      Rate and Rhythm: Normal rate and regular rhythm.      Pulses: Normal pulses.      Heart sounds: Normal heart sounds. No murmur heard.     No friction rub. No gallop.   Pulmonary:      Effort: Pulmonary effort is normal. No respiratory distress.      Breath sounds: Normal breath sounds. No stridor. No wheezing, rhonchi or rales.   Chest:      Chest wall: No tenderness.   Abdominal:      General: Abdomen is flat. There is no distension.      Palpations: Abdomen is soft. There is no mass.      Tenderness: There is no abdominal tenderness. There is no guarding.      Hernia: No hernia is present.   Musculoskeletal:         General: No swelling, tenderness, deformity or signs of injury. Normal range of motion.      Cervical back: Normal range of motion and neck supple. No rigidity.   Skin:     General: Skin is warm and dry.      Capillary Refill: Capillary refill takes less than 2 seconds.      Coloration: Skin is not jaundiced or pale.      Findings: No bruising, erythema, lesion or rash.   Neurological:      General: No focal deficit present.   Psychiatric:         Attention and Perception: He is inattentive.         Mood and Affect: Mood is depressed. Affect is flat.         Speech: Speech is delayed.         Behavior: Behavior is withdrawn.         ED Course & MDM   Diagnoses as of 02/15/24 1133   Schizoaffective disorder, unspecified type (CMS/HCC)       Medical Decision Making  Patient is a 43-year-old male presenting to the emergency department for complaints of overdose on Seroquel and doxepin.  Patient is controlling his airway and responsive to verbal stimuli but appears sleepy on exam.   No murmurs gallops or rubs.  Lungs clear to auscultation bilaterally.  Abdomen soft nondistended nontender.  Poison control was contacted who recommends 6-hour observation period with EKGs every 30 minutes for 2 hours evaluating for QT and QRS prolongation as well as labs and seizure precautions.  During my evaluation the patient was not tachycardic or hypotensive.  Patient had no agitation or signs of anticholinergic effects.  No seizure activity was noted but seizure precautions were placed.  EKG continuously evaluated without QRS or QT C prolongation.  CT head due to the altered mental status was performed showing no acute intracranial pathology.  CBC reviewed without concerning findings.  Metabolic panel with hypokalemia 3.1 replacement was ordered.  Serum tox screen was negative.  CK levels are normal.  No concerning findings on VBG.  COVID and influenza were negative.  Urinalysis and urine drug screen pending.  Patient signed out to oncoming provider pending repeat evaluation EPAT evaluation and recommendations.    Amount and/or Complexity of Data Reviewed  Labs: ordered. Decision-making details documented in ED Course.  Radiology: ordered. Decision-making details documented in ED Course.  ECG/medicine tests: independent interpretation performed.     Details: 1514 hrs.: Sinus rhythm with a ventricular rate of 70 bpm.  QRS interval 92 ms.  QTc 425 ms.  Normal axis.  No acute ischemic injury pattern appreciated.  1543 hrs.: Sinus rhythm with a ventricular rate of 61 bpm.  QRS interval 94 ms.  QTc 408 ms.  Normal axis.  No acute ischemic injury pattern appreciated.  1615 hrs.: Sinus rhythm with a ventricular rate of 56 bpm.  QRS interval 96 ms.  QTc 418 ms.  Normal axis.  No acute ischemic injury pattern appreciated.  1649 hrs.: Sinus rhythm with a ventricular rate of 57 bpm.  QRS interval 98 ms.  QTc 443 ms.  Normal axis.  No acute ischemic injury pattern appreciated.  1716 hrs. sinus rhythm with a ventricular  rate of 52 bpm.  QRS interval 90 ms.  QTc 412 ms.  Normal axis.  No acute ischemic injury pattern noted.      Labs Reviewed   CBC WITH AUTO DIFFERENTIAL - Abnormal       Result Value    WBC 6.1      nRBC 0.0      RBC 4.80      Hemoglobin 12.4 (*)     Hematocrit 38.5 (*)     MCV 80      MCH 25.8 (*)     MCHC 32.2      RDW 15.1 (*)     Platelets 223      Neutrophils % 61.8      Immature Granulocytes %, Automated 0.2      Lymphocytes % 26.4      Monocytes % 10.1      Eosinophils % 1.0      Basophils % 0.5      Neutrophils Absolute 3.80      Immature Granulocytes Absolute, Automated 0.01      Lymphocytes Absolute 1.62      Monocytes Absolute 0.62      Eosinophils Absolute 0.06      Basophils Absolute 0.03     COMPREHENSIVE METABOLIC PANEL - Abnormal    Glucose 88      Sodium 142      Potassium 3.1 (*)     Chloride 105      Bicarbonate 28      Anion Gap 12      Urea Nitrogen 10      Creatinine 0.77      eGFR >90      Calcium 9.5      Albumin 4.3      Alkaline Phosphatase 52      Total Protein 6.9      AST 15      Bilirubin, Total 0.7      ALT 17     BLOOD GAS VENOUS - Abnormal    POCT pH, Venous 7.42      POCT pCO2, Venous 47      POCT pO2, Venous 53 (*)     POCT SO2, Venous 89 (*)     POCT Oxy Hemoglobin, Venous 84.9 (*)     POCT Base Excess, Venous 5.1 (*)     POCT HCO3 Calculated, Venous 30.5 (*)     Patient Temperature        FiO2 100     POCT GLUCOSE - Abnormal    POCT Glucose 101 (*)    ACUTE TOXICOLOGY PANEL, BLOOD - Normal    Acetaminophen <10.0      Salicylate  <3      Alcohol <10     CREATINE KINASE - Normal    Creatine Kinase 139     SARS-COV-2 AND INFLUENZA A/B PCR - Normal    Flu A Result Not Detected      Flu B Result Not Detected      Coronavirus 2019, PCR Not Detected      Narrative:     This assay has received FDA Emergency Use Authorization (EUA) and  is only authorized for the duration of time that circumstances exist to justify the authorization of the emergency use of in vitro diagnostic tests for  the detection of SARS-CoV-2 virus and/or diagnosis of COVID-19 infection under section 564(b)(1) of the Act, 21 U.S.C. 360bbb-3(b)(1). Testing for SARS-CoV-2 is only recommended for patients who meet current clinical and/or epidemiological criteria as defined by federal, state, or local public health directives. This assay is an in vitro diagnostic nucleic acid amplification test for the qualitative detection of SARS-CoV-2, Influenza A, and Influenza B from nasopharyngeal specimens and has been validated for use at [facility]. Negative results do not preclude COVID-19 infections or Influenza A/B infections, and should not be used as the sole basis for diagnosis, treatment, or other management decisions. If Influenza A/B and RSV PCR results are negative, testing for Parainfluenza virus, Adenovirus and Metapneumovirus is routinely performed for [facility] pediatric oncology and intensive care inpatients, and is available on other patients by placing an add-on request.    DRUG SCREEN,URINE   URINALYSIS WITH REFLEX CULTURE AND MICROSCOPIC    Narrative:     The following orders were created for panel order Urinalysis with Reflex Culture and Microscopic.  Procedure                               Abnormality         Status                     ---------                               -----------         ------                     Urinalysis with Reflex C...[[id number]]                                                 Extra Urine Gray Tube[[id number]]                                                         Please view results for these tests on the individual orders.   URINALYSIS WITH REFLEX CULTURE AND MICROSCOPIC   EXTRA URINE GRAY TUBE   POCT GLUCOSE METER     CT head wo IV contrast   Final Result   Unremarkable exam.   There has not been significant interval change from the prior exam.        Signed by: Christian Matt 2/13/2024 4:33 PM   Dictation workstation:   [id number]            Procedure  Procedures      Chace Newton, DO  02/13/24 1754       Chace Newton, DO  02/15/24 1137

## 2024-02-13 NOTE — PROGRESS NOTES
"Emergency Medicine Transition of Care Note.    I received Jean-Pierre Stubbs in signout from Dr. Newton.  Please see the previous ED provider note for all HPI, PE and MDM up to the time of signout at 1800. This is in addition to the primary record.    In brief Jean-Pierre Stubbs is an 43 y.o. male presenting for   Chief Complaint   Patient presents with    Drug Overdose     Intentional overdose of unknown amount of Seroquel and doxepin. Pt. States he intentionally took the medication because \"I am depressed\"      At the time of signout we were awaiting: re-evaluation, EPAT, disposition.    Diagnoses as of 02/14/24 1126   Schizoaffective disorder, unspecified type (CMS/[facility])       Medical Decision Making  EKG on my interpretation: Rate 57, rhythm regular, axis normal, , , QTc 434, T waves: Unremarkable, ST segments: No elevations or depressions, dictation: Sinus bradycardia, no STEMI    EKG my interpretation: Rate 51, rhythm regular, axis normal, , QRS 94, QTc 401, T waves: Unremarkable, ST segments: No elevations or depressions, dictation: Sinus bradycardia, no STEMI    Patient is a 43-year-old male who presented for psychiatric evaluation.  Patient was handed off pending repeat EKGs, medical clearance, EPAT evaluation and disposition.  Patient's EKGs had no significant changes versus his previous EKGs.  He had no QRS widening or QTc prolongation.  I reevaluated the patient around 7 PM and he continued to be difficult to arouse, not following some commands and alert and oriented x 2.  I discussed patient's case with the ICU and the medicine service, the medicine service accepted him for admission.  Just prior to transport to the floor, patient had an improvement in his mental status and on reevaluation he is alert orient x 3, moving all extremities equally, no focal deficits.  Patient was evaluated by EPAT who recommended inpatient placement.    Disclaimer: This note was dictated using speech recognition " software. Minor errors in transcription may be present. Please call if questions.     Mauricio Casper MD  [facility] Emergency Medicine  Contact on [employer] Ha[employer]            Final diagnoses:   [H13.777R] Drug overdose of undetermined intent, initial encounter           Procedure  Procedures    Mauricio Casper MD

## 2024-02-14 VITALS
HEART RATE: 80 BPM | RESPIRATION RATE: 18 BRPM | OXYGEN SATURATION: 100 % | HEIGHT: 69 IN | BODY MASS INDEX: 31.1 KG/M2 | WEIGHT: 210 LBS | DIASTOLIC BLOOD PRESSURE: 83 MMHG | TEMPERATURE: 97 F | SYSTOLIC BLOOD PRESSURE: 180 MMHG

## 2024-02-14 LAB
ATRIAL RATE: 51 BPM
ATRIAL RATE: 52 BPM
ATRIAL RATE: 56 BPM
ATRIAL RATE: 57 BPM
ATRIAL RATE: 57 BPM
ATRIAL RATE: 60 BPM
ATRIAL RATE: 61 BPM
ATRIAL RATE: 70 BPM
HOLD SPECIMEN: NORMAL
P AXIS: 51 DEGREES
P AXIS: 52 DEGREES
P AXIS: 54 DEGREES
P AXIS: 55 DEGREES
P AXIS: 55 DEGREES
P AXIS: 56 DEGREES
P AXIS: 56 DEGREES
P AXIS: 58 DEGREES
P OFFSET: 174 MS
P OFFSET: 176 MS
P OFFSET: 178 MS
P OFFSET: 178 MS
P OFFSET: 184 MS
P OFFSET: 185 MS
P OFFSET: 185 MS
P OFFSET: 187 MS
P ONSET: 115 MS
P ONSET: 117 MS
P ONSET: 120 MS
P ONSET: 123 MS
P ONSET: 125 MS
P ONSET: 125 MS
P ONSET: 129 MS
P ONSET: 131 MS
PR INTERVAL: 188 MS
PR INTERVAL: 188 MS
PR INTERVAL: 190 MS
PR INTERVAL: 194 MS
PR INTERVAL: 196 MS
PR INTERVAL: 196 MS
PR INTERVAL: 200 MS
PR INTERVAL: 204 MS
Q ONSET: 214 MS
Q ONSET: 215 MS
Q ONSET: 217 MS
Q ONSET: 222 MS
Q ONSET: 223 MS
Q ONSET: 223 MS
Q ONSET: 224 MS
Q ONSET: 225 MS
QRS COUNT: 10 BEATS
QRS COUNT: 12 BEATS
QRS COUNT: 8 BEATS
QRS COUNT: 9 BEATS
QRS COUNT: 9 BEATS
QRS DURATION: 102 MS
QRS DURATION: 104 MS
QRS DURATION: 90 MS
QRS DURATION: 92 MS
QRS DURATION: 94 MS
QRS DURATION: 98 MS
QT INTERVAL: 394 MS
QT INTERVAL: 406 MS
QT INTERVAL: 434 MS
QT INTERVAL: 436 MS
QT INTERVAL: 444 MS
QT INTERVAL: 446 MS
QT INTERVAL: 448 MS
QT INTERVAL: 456 MS
QTC CALCULATION(BAZETT): 401 MS
QTC CALCULATION(BAZETT): 408 MS
QTC CALCULATION(BAZETT): 412 MS
QTC CALCULATION(BAZETT): 425 MS
QTC CALCULATION(BAZETT): 432 MS
QTC CALCULATION(BAZETT): 434 MS
QTC CALCULATION(BAZETT): 434 MS
QTC CALCULATION(BAZETT): 443 MS
QTC FREDERICIA: 408 MS
QTC FREDERICIA: 413 MS
QTC FREDERICIA: 415 MS
QTC FREDERICIA: 423 MS
QTC FREDERICIA: 434 MS
QTC FREDERICIA: 438 MS
QTC FREDERICIA: 438 MS
QTC FREDERICIA: 448 MS
R AXIS: 63 DEGREES
R AXIS: 63 DEGREES
R AXIS: 67 DEGREES
R AXIS: 67 DEGREES
R AXIS: 70 DEGREES
R AXIS: 71 DEGREES
R AXIS: 73 DEGREES
R AXIS: 78 DEGREES
T AXIS: 36 DEGREES
T AXIS: 50 DEGREES
T AXIS: 52 DEGREES
T AXIS: 56 DEGREES
T AXIS: 56 DEGREES
T AXIS: 57 DEGREES
T AXIS: 57 DEGREES
T AXIS: 61 DEGREES
T OFFSET: 422 MS
T OFFSET: 426 MS
T OFFSET: 438 MS
T OFFSET: 438 MS
T OFFSET: 441 MS
T OFFSET: 441 MS
T OFFSET: 444 MS
T OFFSET: 445 MS
VENTRICULAR RATE: 51 BPM
VENTRICULAR RATE: 52 BPM
VENTRICULAR RATE: 56 BPM
VENTRICULAR RATE: 57 BPM
VENTRICULAR RATE: 57 BPM
VENTRICULAR RATE: 60 BPM
VENTRICULAR RATE: 61 BPM
VENTRICULAR RATE: 70 BPM

## 2024-02-14 PROCEDURE — 99239 HOSP IP/OBS DSCHRG MGMT >30: CPT | Performed by: STUDENT IN AN ORGANIZED HEALTH CARE EDUCATION/TRAINING PROGRAM

## 2024-02-14 PROCEDURE — G0378 HOSPITAL OBSERVATION PER HR: HCPCS

## 2024-02-14 PROCEDURE — 96375 TX/PRO/DX INJ NEW DRUG ADDON: CPT

## 2024-02-14 PROCEDURE — 2500000004 HC RX 250 GENERAL PHARMACY W/ HCPCS (ALT 636 FOR OP/ED)

## 2024-02-14 RX ORDER — LORAZEPAM 2 MG/ML
2 INJECTION INTRAMUSCULAR ONCE
Status: COMPLETED | OUTPATIENT
Start: 2024-02-14 | End: 2024-02-14

## 2024-02-14 RX ORDER — DIPHENHYDRAMINE HYDROCHLORIDE 50 MG/ML
INJECTION INTRAMUSCULAR; INTRAVENOUS
Status: COMPLETED
Start: 2024-02-14 | End: 2024-02-14

## 2024-02-14 RX ORDER — HALOPERIDOL 5 MG/ML
INJECTION INTRAMUSCULAR
Status: COMPLETED
Start: 2024-02-14 | End: 2024-02-14

## 2024-02-14 RX ORDER — DIPHENHYDRAMINE HYDROCHLORIDE 50 MG/ML
25 INJECTION INTRAMUSCULAR; INTRAVENOUS ONCE
Status: COMPLETED | OUTPATIENT
Start: 2024-02-14 | End: 2024-02-14

## 2024-02-14 RX ORDER — HALOPERIDOL 5 MG/ML
5 INJECTION INTRAMUSCULAR ONCE
Status: COMPLETED | OUTPATIENT
Start: 2024-02-14 | End: 2024-02-14

## 2024-02-14 RX ORDER — HALOPERIDOL 5 MG/ML
5 INJECTION INTRAMUSCULAR ONCE
Status: DISCONTINUED | OUTPATIENT
Start: 2024-02-14 | End: 2024-02-14 | Stop reason: HOSPADM

## 2024-02-14 RX ORDER — DIPHENHYDRAMINE HYDROCHLORIDE 50 MG/ML
25 INJECTION INTRAMUSCULAR; INTRAVENOUS ONCE
Status: DISCONTINUED | OUTPATIENT
Start: 2024-02-14 | End: 2024-02-14 | Stop reason: HOSPADM

## 2024-02-14 RX ORDER — LORAZEPAM 2 MG/ML
2 INJECTION INTRAMUSCULAR ONCE
Status: DISCONTINUED | OUTPATIENT
Start: 2024-02-14 | End: 2024-02-14 | Stop reason: HOSPADM

## 2024-02-14 RX ORDER — LORAZEPAM 2 MG/ML
INJECTION INTRAMUSCULAR
Status: COMPLETED
Start: 2024-02-14 | End: 2024-02-14

## 2024-02-14 RX ADMIN — LORAZEPAM 2 MG: 2 INJECTION, SOLUTION INTRAMUSCULAR; INTRAVENOUS at 01:20

## 2024-02-14 RX ADMIN — HALOPERIDOL LACTATE 5 MG: 5 INJECTION, SOLUTION INTRAMUSCULAR at 01:35

## 2024-02-14 RX ADMIN — DIPHENHYDRAMINE HYDROCHLORIDE 25 MG: 50 INJECTION, SOLUTION INTRAMUSCULAR; INTRAVENOUS at 02:07

## 2024-02-14 RX ADMIN — LORAZEPAM 2 MG: 2 INJECTION INTRAMUSCULAR at 01:20

## 2024-02-14 RX ADMIN — DIPHENHYDRAMINE HYDROCHLORIDE 25 MG: 50 INJECTION INTRAMUSCULAR; INTRAVENOUS at 02:07

## 2024-02-14 RX ADMIN — HALOPERIDOL 5 MG: 5 INJECTION INTRAMUSCULAR at 01:35

## 2024-02-14 SDOH — HEALTH STABILITY: MENTAL HEALTH: CONTENT: BLAMING OTHERS

## 2024-02-14 SDOH — HEALTH STABILITY: MENTAL HEALTH: BEHAVIORS/MOOD: ANGRY;AGGRESSIVE PHYSICALLY, OTHERS;AGGRESSIVE PHYSICALLY, SELF;AGGRESSIVE VERBALLY, OTHERS;COMBATIVE

## 2024-02-14 SDOH — SOCIAL STABILITY: SOCIAL NETWORK: PARENT/GUARDIAN/SIGNIFICANT OTHER INVOLVEMENT: NO INVOLVEMENT

## 2024-02-14 SDOH — HEALTH STABILITY: MENTAL HEALTH: BEHAVIORS/MOOD: SLEEPING;CALM;COOPERATIVE

## 2024-02-14 SDOH — HEALTH STABILITY: MENTAL HEALTH

## 2024-02-14 SDOH — HEALTH STABILITY: MENTAL HEALTH: BEHAVIORS/MOOD: CALM;COOPERATIVE

## 2024-02-14 SDOH — HEALTH STABILITY: MENTAL HEALTH: BEHAVIORS/MOOD: SLEEPING

## 2024-02-14 SDOH — HEALTH STABILITY: MENTAL HEALTH: SLEEP PATTERN: UNABLE TO ASSESS

## 2024-02-14 SDOH — HEALTH STABILITY: MENTAL HEALTH: HAVE YOU EVER DONE ANYTHING, STARTED TO DO ANYTHING, OR PREPARED TO DO ANYTHING TO END YOUR LIFE?: NO

## 2024-02-14 SDOH — HEALTH STABILITY: MENTAL HEALTH: HAVE YOU ACTUALLY HAD ANY THOUGHTS OF KILLING YOURSELF?: NO

## 2024-02-14 SDOH — HEALTH STABILITY: MENTAL HEALTH: HAVE YOU WISHED YOU WERE DEAD OR WISHED YOU COULD GO TO SLEEP AND NOT WAKE UP?: NO

## 2024-02-14 NOTE — SIGNIFICANT EVENT
Application for Emergency Admission      Ready for Transfer?  Is the patient medically cleared for transfer to inpatient psychiatry: Yes  Has the patient been accepted to an inpatient psychiatric hospital: Yes    Application for Emergency Admission  IN ACCORDANCE WITH [place]  The Chief Clinical Officer of: Shauna Wood 2/14/2024 .6:31 AM    Reason for Hospitalization  The undersigned has reason to believe that: Jean-Pierre Stubbs Is a mentally ill person subject to hospitalization by court order under division B Section 5122.01 of the Revised Code, i.e., this person:    1.Yes  Represents a substantial risk of physical harm to self as manifested by evidence of threats of, or attempts at, suicide or serious self-inflicted bodily harm    2.Yes Represents a substantial risk of physical harm to others as manifested by evidence of recent homicidal or other violent behavior, evidence of recent threats that place another in reasonable fear of violent behavior and serious physical harm, or other evidence of present dangerousness    3.Yes Represents a substantial and immediate risk of serious physical impairment or injury to self as manifested by  evidence that the person is unable to provide for and is not providing for the person's basic physical needs because of the person's mental illness and that appropriate provision for those needs cannot be made  immediately available in the community    4.Yes Would benefit from treatment in a hospital for his mental illness and is in need of such treatment as manifested by evidence of behavior that creates a grave and imminent risk to substantial rights of others or  himself.    5.Yes Would benefit from treatment as manifested by evidence of behavior that indicates all of the following:       (a) The person is unlikely to survive safely in the community without supervision, based on a clinical determination.       (b) The person has a history of lack of  compliance with treatment for mental illness and one of the following applies:      (i) At least twice within the thirty-six months prior to the filing of an affidavit seeking court-ordered treatment of the person under section 5122.111 of the Revised Code, the lack of compliance has been a significant factor in necessitating hospitalization in a hospital or receipt of services in a forensic or other mental health unit of a correctional facility, provided that the thirty-six-month period shall be extended by the length of any hospitalization or incarceration of the person that occurred within the thirty-six-month period.      (ii) Within the forty-eight months prior to the filing of an affidavit seeking court-ordered treatment of the person under section 5122.111 of the Revised Code, the lack of compliance resulted in one or more acts of serious violent behavior toward self or others or threats of, or attempts at, serious physical harm to self or others, provided that the forty-eight-month period shall be extended by the length of any hospitalization or incarceration of the person that occurred within the forty-eight-month period.      (c) The person, as a result of mental illness, is unlikely to voluntarily participate in necessary treatment.       (d) In view of the person's treatment history and current behavior, the person is in need of treatment in order to prevent a relapse or deterioration that would be likely to result in substantial risk of serious harm to the person or others.    (e) Represents a substantial risk of physical harm to self or others if allowed to remain at liberty pending examination.    Therefore, it is requested that said person be admitted to the above named facility.    STATEMENT OF BELIEF    Must be filled out by one of the following: a psychiatrist, licensed physician, licensed clinical psychologist, health or ,  or .  (Statement shall include the  circumstances under which the individual was taken into custody and the reason for the person's belief that hospitalization is necessary. The statement shall also include a reference to efforts made to secure the individual's property at his residence if he was taken into custody there. Every reasonable and appropriate effort should be made to take this person into custody in the least conspicuous manner possible.)  Patient is here with psychosis with suicidal attempt    Priyanka Melo MD 2/14/2024     _____________________________________________________________   Place of Employment: [employer]    STATEMENT OF OBSERVATION BY PSYCHIATRIST, LICENSED PHYSICIAN, OR LICENSED CLINICAL PSYCHOLOGIST, IF APPLICABLE    Place of Observation (e.g., [facility] mental health center, general hospital, office, emergency facility)  (If applicable, please complete)    Priyanka Melo MD 2/14/2024    _____________________________________________________________

## 2024-02-14 NOTE — H&P
Medical Group History and Physical    ASSESSMENT & PLAN:     Intentional drug overdose  Suicidal ideations/attempt?  Depression  - Patient took undisclosed amount of Seroquel and doxepin  - Poison control consulted  - Multiple EKGs showing slow prolongation of Qtc; bradycardia HR 52 with max QTc 438 at time of admission  - Consult psych  - Pink slip    Bradycardia 40s  Prolonged Qtc  Hypokalemia  Hypotension with hx of HTN  -Medically manage and monitor overnight  -Replace and reassess electrolytes K/Mag  -Telemetry no overnight, serial EKGs  - SBP 90s  -Resume home meds when appropriate      Follow-up assessment in ER:  - Discussed case with ICU, patient is able to protect his airway, lethargic on exam, uncooperative, now bradycardic low 40s responds when woken HR 52-56, systolic BP low 90s  - We will admit to medicine service at this time, 1:1 sitter, MUST remain on telemetry, EKG prior to transfer      VTE Prophylaxis: ARELI Rick-CNP    Addendum 2/13/2024 at 2230:  Patient has been medically cleared and is ready for psychiatric evaluation and admission; pt no longer requiring admission for medical clearance prior to Psych eval.   EPAT eval to follow.    HISTORY OF PRESENT ILLNESS:   Chief Complaint: Suicide attempt, drug overdose - Seroquel and doxepin    History Of Present Illness:    Jean-Pierre Stubbs is a 43 y.o. male with a significant past medical history of current smoker, HTN, anxiety and depression presenting to [facility] after suicide attempt via overdose with Seroquel and doxepin because he was sad.   Serial EKGs were obtained in ER, patient IS able to protect his airway, patient is uncooperative with exam not forthcoming regarding how much Seroquel or doxepin he had taken.  Of note patient is bradycardic in the 40s while asleep, regular rhythm, this case was discussed with ICU, will admit to medicine for medical clearance will require telemetry and serial EKGs and one-to-one  "sitter.    Lab work shows evidence of hypokalemia K3.1 replacement given, magnesium 2.1, 2 g were provided in ER, further lab work is otherwise unremarkable, urine tox screen is positive for cannabis and cocaine.  CT head negative for acute intracranial bleeding or abnormalities.  UA negative for UTI.    Patient is now ready for admission under general medicine for suicidal ideation via drug overdose with Seroquel requiring medical clearance, patient is pink slipped and psych to follow    Review of systems: 10 point review of systems is otherwise negative except as mentioned above.    PAST HISTORIES:       Past Medical History:  Medical Problems       Problem List       * (Principal) OD (overdose of drug), intentional self-harm, sequela (CMS/Formerly [facility])    Overview Signed 2/13/2024  8:14 PM by Salud Gotti, APRN-CNP     Took unknown amounts of seroquel and doxepin because he was \"sad\"; posion control contacted, QTc prolonged, monitor medically overnight and plan for Psych admission following medical clearance for SI         TIA (transient ischemic attack)    Anxiety and depression    Tobacco use disorder    Drug overdose, intentional self-harm, initial encounter (CMS/Formerly [facility])           Past Surgical History:  History reviewed. No pertinent surgical history.       Social History:  He reports that he has been smoking cigarettes. He uses smokeless tobacco. No history on file for alcohol use and drug use.    Family History:  No family history on file.     Allergies:  Patient has no known allergies.    OBJECTIVE:       Last Recorded Vitals:  Vitals:    02/13/24 1507 02/13/24 1645 02/13/24 1714 02/13/24 1921   BP:  118/59 124/64 134/78   Pulse:  55 96 52   Resp:   16 14   Temp:       SpO2: 100% 98% 96% 100%   Weight:       Height:           Last I/O:  No intake/output data recorded.    Physical Exam  Vitals and nursing note reviewed.   Constitutional:       Appearance: Normal appearance. He is normal weight.   HENT:      Head: " Normocephalic and atraumatic.      Nose: Nose normal.      Mouth/Throat:      Mouth: Mucous membranes are dry.      Pharynx: Oropharynx is clear.   Eyes:      Pupils: Pupils are equal, round, and reactive to light.      Comments: Uncooperative with exam   Cardiovascular:      Rate and Rhythm: Regular rhythm. Bradycardia present.      Pulses: Normal pulses.      Heart sounds: Normal heart sounds.   Pulmonary:      Effort: Pulmonary effort is normal.      Breath sounds: Normal breath sounds.   Abdominal:      General: Abdomen is flat. Bowel sounds are normal.      Palpations: Abdomen is soft.   Musculoskeletal:         General: Normal range of motion.      Cervical back: Normal range of motion.   Skin:     General: Skin is warm and dry.      Capillary Refill: Capillary refill takes 2 to 3 seconds.   Neurological:      Mental Status: He is oriented to person, place, and time. Mental status is at baseline.      Motor: Weakness present.      Comments: Lethargic, uncooperative with exam   Psychiatric:      Comments: SI, drug overdose with Seroquel and doxepin           Scheduled Medications  potassium chloride, 20 mEq, intravenous, q2h      PRN Medications    Continuous Medications  oxygen,         Outpatient Medications:  Prior to Admission medications    Medication Sig Start Date End Date Taking? Authorizing Provider   amLODIPine (Norvasc) 5 mg tablet Take 1 tablet (5 mg) by mouth once daily. 12/7/23 1/6/24  Varghese Kendall MD       LABS AND IMAGING:     Labs:  Results for orders placed or performed during the hospital encounter of 02/13/24 (from the past 24 hour(s))   CBC and Auto Differential   Result Value Ref Range    WBC 6.1 4.4 - 11.3 x10*3/uL    nRBC 0.0 0.0 - 0.0 /100 WBCs    RBC 4.80 4.50 - 5.90 x10*6/uL    Hemoglobin 12.4 (L) 13.5 - 17.5 g/dL    Hematocrit 38.5 (L) 41.0 - 52.0 %    MCV 80 80 - 100 fL    MCH 25.8 (L) 26.0 - 34.0 pg    MCHC 32.2 32.0 - 36.0 g/dL    RDW 15.1 (H) 11.5 - 14.5 %    Platelets 223  150 - 450 x10*3/uL    Neutrophils % 61.8 40.0 - 80.0 %    Immature Granulocytes %, Automated 0.2 0.0 - 0.9 %    Lymphocytes % 26.4 13.0 - 44.0 %    Monocytes % 10.1 2.0 - 10.0 %    Eosinophils % 1.0 0.0 - 6.0 %    Basophils % 0.5 0.0 - 2.0 %    Neutrophils Absolute 3.80 1.20 - 7.70 x10*3/uL    Immature Granulocytes Absolute, Automated 0.01 0.00 - 0.70 x10*3/uL    Lymphocytes Absolute 1.62 1.20 - 4.80 x10*3/uL    Monocytes Absolute 0.62 0.10 - 1.00 x10*3/uL    Eosinophils Absolute 0.06 0.00 - 0.70 x10*3/uL    Basophils Absolute 0.03 0.00 - 0.10 x10*3/uL   Comprehensive Metabolic Panel   Result Value Ref Range    Glucose 88 74 - 99 mg/dL    Sodium 142 136 - 145 mmol/L    Potassium 3.1 (L) 3.5 - 5.3 mmol/L    Chloride 105 98 - 107 mmol/L    Bicarbonate 28 21 - 32 mmol/L    Anion Gap 12 10 - 20 mmol/L    Urea Nitrogen 10 6 - 23 mg/dL    Creatinine 0.77 0.50 - 1.30 mg/dL    eGFR >90 >60 mL/min/1.73m*2    Calcium 9.5 8.6 - 10.3 mg/dL    Albumin 4.3 3.4 - 5.0 g/dL    Alkaline Phosphatase 52 33 - 120 U/L    Total Protein 6.9 6.4 - 8.2 g/dL    AST 15 9 - 39 U/L    Bilirubin, Total 0.7 0.0 - 1.2 mg/dL    ALT 17 10 - 52 U/L   Acute Toxicology Panel, Blood   Result Value Ref Range    Acetaminophen <10.0 10.0 - 30.0 ug/mL    Salicylate  <3 4 - 20 mg/dL    Alcohol <10 <=10 mg/dL   Creatine Kinase   Result Value Ref Range    Creatine Kinase 139 0 - 325 U/L   Magnesium   Result Value Ref Range    Magnesium 2.12 1.60 - 2.40 mg/dL   Sars-CoV-2 and Influenza A/B PCR   Result Value Ref Range    Flu A Result Not Detected Not Detected    Flu B Result Not Detected Not Detected    Coronavirus 2019, PCR Not Detected Not Detected   Electrocardiogram, 12-lead   Result Value Ref Range    Ventricular Rate 52 BPM    Atrial Rate 52 BPM    AK Interval 194 ms    QRS Duration 90 ms    QT Interval 444 ms    QTC Calculation(Bazett) 412 ms    P Axis 58 degrees    R Axis 71 degrees    T Axis 57 degrees    QRS Count 8 beats    Q Onset 222 ms    P Onset  125 ms    P Offset 185 ms    T Offset 444 ms    QTC Fredericia 423 ms   BLOOD GAS VENOUS   Result Value Ref Range    POCT pH, Venous 7.42 7.33 - 7.43 pH    POCT pCO2, Venous 47 41 - 51 mm Hg    POCT pO2, Venous 53 (H) 35 - 45 mm Hg    POCT SO2, Venous 89 (H) 45 - 75 %    POCT Oxy Hemoglobin, Venous 84.9 (H) 45.0 - 75.0 %    POCT Base Excess, Venous 5.1 (H) -2.0 - 3.0 mmol/L    POCT HCO3 Calculated, Venous 30.5 (H) 22.0 - 26.0 mmol/L    Patient Temperature      FiO2 100 %   ECG 12 lead   Result Value Ref Range    Ventricular Rate 70 BPM    Atrial Rate 70 BPM    NV Interval 188 ms    QRS Duration 92 ms    QT Interval 394 ms    QTC Calculation(Bazett) 425 ms    P Axis 55 degrees    R Axis 67 degrees    T Axis 36 degrees    QRS Count 12 beats    Q Onset 225 ms    P Onset 131 ms    P Offset 185 ms    T Offset 422 ms    QTC Fredericia 415 ms   POCT GLUCOSE   Result Value Ref Range    POCT Glucose 101 (H) 74 - 99 mg/dL   ECG 12 lead   Result Value Ref Range    Ventricular Rate 61 BPM    Atrial Rate 61 BPM    NV Interval 196 ms    QRS Duration 94 ms    QT Interval 406 ms    QTC Calculation(Bazett) 408 ms    P Axis 51 degrees    R Axis 67 degrees    T Axis 61 degrees    QRS Count 10 beats    Q Onset 223 ms    P Onset 125 ms    P Offset 184 ms    T Offset 426 ms    QTC Fredericia 408 ms   ECG 12 lead   Result Value Ref Range    Ventricular Rate 60 BPM    Atrial Rate 60 BPM    NV Interval 190 ms    QRS Duration 94 ms    QT Interval 434 ms    QTC Calculation(Bazett) 434 ms    P Axis 56 degrees    R Axis 73 degrees    T Axis 56 degrees    QRS Count 10 beats    Q Onset 224 ms    P Onset 129 ms    P Offset 187 ms    T Offset 441 ms    QTC Fredericia 434 ms   ECG 12 lead   Result Value Ref Range    Ventricular Rate 56 BPM    Atrial Rate 56 BPM    NV Interval 188 ms    QRS Duration 102 ms    QT Interval 448 ms    QTC Calculation(Bazett) 432 ms    P Axis 55 degrees    R Axis 63 degrees    T Axis 50 degrees    QRS Count 10 beats    Q  Onset 214 ms    P Onset 120 ms    P Offset 176 ms    T Offset 438 ms    QTC Fredericia 438 ms   ECG 12 lead   Result Value Ref Range    Ventricular Rate 57 BPM    Atrial Rate 57 BPM    VA Interval 204 ms    QRS Duration 98 ms    QT Interval 456 ms    QTC Calculation(Bazett) 443 ms    P Axis 52 degrees    R Axis 70 degrees    T Axis 57 degrees    QRS Count 10 beats    Q Onset 217 ms    P Onset 115 ms    P Offset 178 ms    T Offset 445 ms    QTC Fredericia 448 ms   ECG 12 lead   Result Value Ref Range    Ventricular Rate 57 BPM    Atrial Rate 57 BPM    VA Interval 196 ms    QRS Duration 104 ms    QT Interval 446 ms    QTC Calculation(Bazett) 434 ms    P Axis 54 degrees    R Axis 63 degrees    T Axis 52 degrees    QRS Count 9 beats    Q Onset 215 ms    P Onset 117 ms    P Offset 174 ms    T Offset 438 ms    QTC Fredericia 438 ms   Drug Screen, Urine   Result Value Ref Range    Amphetamine Screen, Urine Presumptive Negative Presumptive Negative    Barbiturate Screen, Urine Presumptive Negative Presumptive Negative    Benzodiazepines Screen, Urine Presumptive Negative Presumptive Negative    Cannabinoid Screen, Urine Presumptive Positive (A) Presumptive Negative    Cocaine Metabolite Screen, Urine Presumptive Positive (A) Presumptive Negative    Fentanyl Screen, Urine Presumptive Negative Presumptive Negative    Opiate Screen, Urine Presumptive Negative Presumptive Negative    Oxycodone Screen, Urine Presumptive Negative Presumptive Negative    PCP Screen, Urine Presumptive Negative Presumptive Negative   Urinalysis with Reflex Culture and Microscopic   Result Value Ref Range    Color, Urine Straw Straw, Yellow    Appearance, Urine Clear Clear    Specific Gravity, Urine 1.005 1.005 - 1.035    pH, Urine 6.0 5.0, 5.5, 6.0, 6.5, 7.0, 7.5, 8.0    Protein, Urine NEGATIVE NEGATIVE mg/dL    Glucose, Urine NEGATIVE NEGATIVE mg/dL    Blood, Urine NEGATIVE NEGATIVE    Ketones, Urine NEGATIVE NEGATIVE mg/dL    Bilirubin, Urine  NEGATIVE NEGATIVE    Urobilinogen, Urine <2.0 <2.0 mg/dL    Nitrite, Urine NEGATIVE NEGATIVE    Leukocyte Esterase, Urine NEGATIVE NEGATIVE        Imaging:  CT head wo IV contrast   Final Result   Unremarkable exam.   There has not been significant interval change from the prior exam.        Signed by: Christian Matt 2/13/2024 4:33 PM   Dictation workstation:   [id number]

## 2024-02-14 NOTE — CONSULTS
Reason For Consult  ICU admission evaluation    History Of Present Illness  Jean-Pierre Stubbs is a 43 y.o. male presenting with intentional overdose of Seroquel doxepin with unknown amounts.  Patient did not answer how many pills he took but does explain that he did take them.  During the exam he did go in and out being awake, but was cooperative and easily arousable.  Patient did deny auditory or visual hallucinations.      ER course of action:   Patient presented to ER with initial vitals: Temperature 36, heart rate 74, respiratory rate 18, /83, and SpO2 99%.  Lab work was collected and sent off CBC notable for: Hemoglobin 12.4, hematocrit 30.5, Red cell distribution width 15.1.  CMP notable for potassium 3.1, creatinine 0.77 which is baseline.  Acute toxicology panel negative for acetaminophen, salicylate, and alcohol.  Venous blood gas notable for: PaO2 53, calculated bicarb 30.5.  Poison control was called and recommended EKGs every 30 minutes for 2 hours looking for QT interval prolongation or prolonged QTc.  Initial twelve-lead EKG showed QT interval 394.  Second EKG collected at 1545 shows QT interval 406.  Third EKG collected 1619 with QT interval 434.  Fourth EKG collected 1813 with QT interval 448. CT head without IV contrast was unremarkable for any acute events or interval changes from prior exams.  Urine drug screen positive for cannabinoid and cocaine.  Most recent vitals which were seen when I went to examine the patient were heart rate 52, /78, respiratory rate 14, and SpO2 100% on room air.    Past Medical History  He has a past medical history of Anxiety, Asthma, Chronic back pain, Depression, Diverticulitis, Fatty liver, IBS (irritable bowel syndrome), and TIA (transient ischemic attack).    Surgical History  He has no past surgical history on file.     Social History  He reports that he has been smoking cigarettes. He uses smokeless tobacco. No history on file for alcohol use and drug  "use.    Family History  No family history on file.     Allergies  Patient has no known allergies.    Review of Systems  Neuro: Denies headaches, blurry vision, hallucinations  Pulm: Denies SOB, cough, sputum  Cardio: Denies palpitations, swelling, diaphoresis  GI: Denies nausea vomiting diarrhea and constipation  : Denies flank pain and hematuria  MSK: Denies joint pain, back pain, gait instability     Physical Exam  Neuro: GCS 14, answers questions appropriately, sleepy but easily arousable and cooperative  Pulm: No tachypnea, no increased work of breathing, bilaterally clear to auscultation  Cardio: S1-S2 crisp with no murmurs or gallops appreciated, no noticeable JVD  GI: Normoactive bowel sounds, nontender to palpation  : No CVA tenderness  MSK: Bilateral radial pulses +2, dorsal pedis pulses +2, no swelling appreciated, normal range of motion     Last Recorded Vitals  Blood pressure 134/78, pulse 52, temperature 36 °C (96.8 °F), resp. rate 14, height 1.753 m (5' 9\"), weight 95.3 kg (210 lb), SpO2 100 %.    Relevant Results  Results for orders placed or performed during the hospital encounter of 02/13/24 (from the past 24 hour(s))   CBC and Auto Differential   Result Value Ref Range    WBC 6.1 4.4 - 11.3 x10*3/uL    nRBC 0.0 0.0 - 0.0 /100 WBCs    RBC 4.80 4.50 - 5.90 x10*6/uL    Hemoglobin 12.4 (L) 13.5 - 17.5 g/dL    Hematocrit 38.5 (L) 41.0 - 52.0 %    MCV 80 80 - 100 fL    MCH 25.8 (L) 26.0 - 34.0 pg    MCHC 32.2 32.0 - 36.0 g/dL    RDW 15.1 (H) 11.5 - 14.5 %    Platelets 223 150 - 450 x10*3/uL    Neutrophils % 61.8 40.0 - 80.0 %    Immature Granulocytes %, Automated 0.2 0.0 - 0.9 %    Lymphocytes % 26.4 13.0 - 44.0 %    Monocytes % 10.1 2.0 - 10.0 %    Eosinophils % 1.0 0.0 - 6.0 %    Basophils % 0.5 0.0 - 2.0 %    Neutrophils Absolute 3.80 1.20 - 7.70 x10*3/uL    Immature Granulocytes Absolute, Automated 0.01 0.00 - 0.70 x10*3/uL    Lymphocytes Absolute 1.62 1.20 - 4.80 x10*3/uL    Monocytes Absolute " 0.62 0.10 - 1.00 x10*3/uL    Eosinophils Absolute 0.06 0.00 - 0.70 x10*3/uL    Basophils Absolute 0.03 0.00 - 0.10 x10*3/uL   Comprehensive Metabolic Panel   Result Value Ref Range    Glucose 88 74 - 99 mg/dL    Sodium 142 136 - 145 mmol/L    Potassium 3.1 (L) 3.5 - 5.3 mmol/L    Chloride 105 98 - 107 mmol/L    Bicarbonate 28 21 - 32 mmol/L    Anion Gap 12 10 - 20 mmol/L    Urea Nitrogen 10 6 - 23 mg/dL    Creatinine 0.77 0.50 - 1.30 mg/dL    eGFR >90 >60 mL/min/1.73m*2    Calcium 9.5 8.6 - 10.3 mg/dL    Albumin 4.3 3.4 - 5.0 g/dL    Alkaline Phosphatase 52 33 - 120 U/L    Total Protein 6.9 6.4 - 8.2 g/dL    AST 15 9 - 39 U/L    Bilirubin, Total 0.7 0.0 - 1.2 mg/dL    ALT 17 10 - 52 U/L   Acute Toxicology Panel, Blood   Result Value Ref Range    Acetaminophen <10.0 10.0 - 30.0 ug/mL    Salicylate  <3 4 - 20 mg/dL    Alcohol <10 <=10 mg/dL   Creatine Kinase   Result Value Ref Range    Creatine Kinase 139 0 - 325 U/L   Magnesium   Result Value Ref Range    Magnesium 2.12 1.60 - 2.40 mg/dL   Sars-CoV-2 and Influenza A/B PCR   Result Value Ref Range    Flu A Result Not Detected Not Detected    Flu B Result Not Detected Not Detected    Coronavirus 2019, PCR Not Detected Not Detected   Electrocardiogram, 12-lead   Result Value Ref Range    Ventricular Rate 52 BPM    Atrial Rate 52 BPM    MO Interval 194 ms    QRS Duration 90 ms    QT Interval 444 ms    QTC Calculation(Bazett) 412 ms    P Axis 58 degrees    R Axis 71 degrees    T Axis 57 degrees    QRS Count 8 beats    Q Onset 222 ms    P Onset 125 ms    P Offset 185 ms    T Offset 444 ms    QTC Fredericia 423 ms   BLOOD GAS VENOUS   Result Value Ref Range    POCT pH, Venous 7.42 7.33 - 7.43 pH    POCT pCO2, Venous 47 41 - 51 mm Hg    POCT pO2, Venous 53 (H) 35 - 45 mm Hg    POCT SO2, Venous 89 (H) 45 - 75 %    POCT Oxy Hemoglobin, Venous 84.9 (H) 45.0 - 75.0 %    POCT Base Excess, Venous 5.1 (H) -2.0 - 3.0 mmol/L    POCT HCO3 Calculated, Venous 30.5 (H) 22.0 - 26.0 mmol/L     Patient Temperature      FiO2 100 %   ECG 12 lead   Result Value Ref Range    Ventricular Rate 70 BPM    Atrial Rate 70 BPM    MS Interval 188 ms    QRS Duration 92 ms    QT Interval 394 ms    QTC Calculation(Bazett) 425 ms    P Axis 55 degrees    R Axis 67 degrees    T Axis 36 degrees    QRS Count 12 beats    Q Onset 225 ms    P Onset 131 ms    P Offset 185 ms    T Offset 422 ms    QTC Fredericia 415 ms   POCT GLUCOSE   Result Value Ref Range    POCT Glucose 101 (H) 74 - 99 mg/dL   ECG 12 lead   Result Value Ref Range    Ventricular Rate 61 BPM    Atrial Rate 61 BPM    MS Interval 196 ms    QRS Duration 94 ms    QT Interval 406 ms    QTC Calculation(Bazett) 408 ms    P Axis 51 degrees    R Axis 67 degrees    T Axis 61 degrees    QRS Count 10 beats    Q Onset 223 ms    P Onset 125 ms    P Offset 184 ms    T Offset 426 ms    QTC Fredericia 408 ms   ECG 12 lead   Result Value Ref Range    Ventricular Rate 60 BPM    Atrial Rate 60 BPM    MS Interval 190 ms    QRS Duration 94 ms    QT Interval 434 ms    QTC Calculation(Bazett) 434 ms    P Axis 56 degrees    R Axis 73 degrees    T Axis 56 degrees    QRS Count 10 beats    Q Onset 224 ms    P Onset 129 ms    P Offset 187 ms    T Offset 441 ms    QTC Fredericia 434 ms   ECG 12 lead   Result Value Ref Range    Ventricular Rate 56 BPM    Atrial Rate 56 BPM    MS Interval 188 ms    QRS Duration 102 ms    QT Interval 448 ms    QTC Calculation(Bazett) 432 ms    P Axis 55 degrees    R Axis 63 degrees    T Axis 50 degrees    QRS Count 10 beats    Q Onset 214 ms    P Onset 120 ms    P Offset 176 ms    T Offset 438 ms    QTC Fredericia 438 ms   ECG 12 lead   Result Value Ref Range    Ventricular Rate 57 BPM    Atrial Rate 57 BPM    MS Interval 204 ms    QRS Duration 98 ms    QT Interval 456 ms    QTC Calculation(Bazett) 443 ms    P Axis 52 degrees    R Axis 70 degrees    T Axis 57 degrees    QRS Count 10 beats    Q Onset 217 ms    P Onset 115 ms    P Offset 178 ms    T Offset 445  ms    QTC Fredericia 448 ms   ECG 12 lead   Result Value Ref Range    Ventricular Rate 57 BPM    Atrial Rate 57 BPM    HI Interval 196 ms    QRS Duration 104 ms    QT Interval 446 ms    QTC Calculation(Bazett) 434 ms    P Axis 54 degrees    R Axis 63 degrees    T Axis 52 degrees    QRS Count 9 beats    Q Onset 215 ms    P Onset 117 ms    P Offset 174 ms    T Offset 438 ms    QTC Fredericia 438 ms   Drug Screen, Urine   Result Value Ref Range    Amphetamine Screen, Urine Presumptive Negative Presumptive Negative    Barbiturate Screen, Urine Presumptive Negative Presumptive Negative    Benzodiazepines Screen, Urine Presumptive Negative Presumptive Negative    Cannabinoid Screen, Urine Presumptive Positive (A) Presumptive Negative    Cocaine Metabolite Screen, Urine Presumptive Positive (A) Presumptive Negative    Fentanyl Screen, Urine Presumptive Negative Presumptive Negative    Opiate Screen, Urine Presumptive Negative Presumptive Negative    Oxycodone Screen, Urine Presumptive Negative Presumptive Negative    PCP Screen, Urine Presumptive Negative Presumptive Negative   Urinalysis with Reflex Culture and Microscopic   Result Value Ref Range    Color, Urine Straw Straw, Yellow    Appearance, Urine Clear Clear    Specific Gravity, Urine 1.005 1.005 - 1.035    pH, Urine 6.0 5.0, 5.5, 6.0, 6.5, 7.0, 7.5, 8.0    Protein, Urine NEGATIVE NEGATIVE mg/dL    Glucose, Urine NEGATIVE NEGATIVE mg/dL    Blood, Urine NEGATIVE NEGATIVE    Ketones, Urine NEGATIVE NEGATIVE mg/dL    Bilirubin, Urine NEGATIVE NEGATIVE    Urobilinogen, Urine <2.0 <2.0 mg/dL    Nitrite, Urine NEGATIVE NEGATIVE    Leukocyte Esterase, Urine NEGATIVE NEGATIVE          Assessment/Plan   43-year-old male with intentional Seroquel and doxepin overdose presents to ER.  Poison control advised 30-minute checks of EKGs for 2 hours to show acute prolonged QT interval.  During serial EKGs QT intervals within normal limits for adult male.  Patient's potassium was  low on presentation and was replaced with 20 mEq IV.  Magnesium was never checked, but with potassium being low and worry for prolonged QT that would replace with 2 g over 2 hours. With [place]-2 score of 5 this patient does not warrant ICU level of care at this time.     Recommendations:  Every 6 neurochecks  Supplement 2 g of magnesium over 2 hours  Recheck potassium in morning, would supplement if K below 3.5  Monitor patient overnight with regular medical floor with telemetry      Critical Care [place]   Critical Care [facility]   Plan Discussed with ICU attending Dr. Rosi Gonzalez PA-C
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
## 2024-02-14 NOTE — DISCHARGE SUMMARY
Discharge Diagnosis  OD (overdose of drug), intentional self-harm, sequela (CMS/HCC)  Bradycardia, QT prolongation    Issues Requiring Follow-Up  None from medicine standpoint however patient needs psychiatrist follow-up for suicidal ideation and depression  Patient is getting transferred to inpatient psych unit    Discharge Meds     Your medication list        ASK your doctor about these medications        Instructions Last Dose Given Next Dose Due   amLODIPine 5 mg tablet  Commonly known as: Norvasc      Take 1 tablet (5 mg) by mouth once daily.                Test Results Pending At Discharge  Pending Labs       No current pending labs.            Hospital Course  Jean-Pierre Stubbs is a 43 y.o. male with a significant past medical history of current smoker, HTN, anxiety and depression who was admitted for suicidal attempt secondary to overdose with Seroquel and doxepin and he was found to be bradycardic and have QT prolongation on EKG.    Patient remained in the ER on cardiac monitor. Poison control consulted, psych was consulted who recommended inpatient psych admission.  Home meds were resumed and heart rate improve to the normal range.  Seizure was placed.  Repeat EKG showed sinus bradycardia QTc was 436.  He was stable at the time of my evaluation and his heart rate was within the normal range.  Patient is medically stable for discharge to inpatient psych unit for further workup and recommendation.  Patient will follow-up as outpatient with his primary care as needed.  He will be resumed on his oral home medication.    Pertinent Physical Exam At Time of Discharge  Physical Exam    General: Well-developed male, in no acute distress, depressed, tearful  HEENT: AT, NC, no JVD, no lymphadenopathy, neck supple  Lungs: Clear, no wheezing, no crackles  Cardiac: Normal S1-S2, no murmur, no gallop  Abdomen: Soft, nontender, no distention, positive bowel sound  Extremities: No deformity, no edema, pulses intact, ROM  intact  Neurological: Alert awake oriented x3, sensation intact, clear speech      Time spent 40 minutes  Raghav Bowles MD

## 2024-02-14 NOTE — ED PROVIDER NOTES
Signout from Dr. Casper in regards to this patient at some point patient got quite agitated was given a dose of Ativan IV Haldol IV Benadryl IV and had to be placed on full restraints he was very aggressive danger to himself and others  Labs Reviewed   CBC WITH AUTO DIFFERENTIAL - Abnormal       Result Value    WBC 6.1      nRBC 0.0      RBC 4.80      Hemoglobin 12.4 (*)     Hematocrit 38.5 (*)     MCV 80      MCH 25.8 (*)     MCHC 32.2      RDW 15.1 (*)     Platelets 223      Neutrophils % 61.8      Immature Granulocytes %, Automated 0.2      Lymphocytes % 26.4      Monocytes % 10.1      Eosinophils % 1.0      Basophils % 0.5      Neutrophils Absolute 3.80      Immature Granulocytes Absolute, Automated 0.01      Lymphocytes Absolute 1.62      Monocytes Absolute 0.62      Eosinophils Absolute 0.06      Basophils Absolute 0.03     COMPREHENSIVE METABOLIC PANEL - Abnormal    Glucose 88      Sodium 142      Potassium 3.1 (*)     Chloride 105      Bicarbonate 28      Anion Gap 12      Urea Nitrogen 10      Creatinine 0.77      eGFR >90      Calcium 9.5      Albumin 4.3      Alkaline Phosphatase 52      Total Protein 6.9      AST 15      Bilirubin, Total 0.7      ALT 17     DRUG SCREEN,URINE - Abnormal    Amphetamine Screen, Urine Presumptive Negative      Barbiturate Screen, Urine Presumptive Negative      Benzodiazepines Screen, Urine Presumptive Negative      Cannabinoid Screen, Urine Presumptive Positive (*)     Cocaine Metabolite Screen, Urine Presumptive Positive (*)     Fentanyl Screen, Urine Presumptive Negative      Opiate Screen, Urine Presumptive Negative      Oxycodone Screen, Urine Presumptive Negative      PCP Screen, Urine Presumptive Negative      Narrative:     Drug screen results are presumptive and should not be used to assess   compliance with prescribed medication. Contact the performing [facility] laboratory   to add-on definitive confirmatory testing if clinically indicated.    Toxicology screening  results are reported qualitatively. The concentration must   be greater than or equal to the cutoff to be reported as positive. The concentration   at which the screening test can detect an individual drug or metabolite varies.   The absence of expected drug(s) and/or drug metabolite(s) may indicate non-compliance,   inappropriate timing of specimen collection relative to drug administration, poor drug   absorption, diluted/adulterated urine, or limitations of testing. For medical purposes   only; not valid for forensic use.    Interpretive questions should be directed to the laboratory medical directors.   BLOOD GAS VENOUS - Abnormal    POCT pH, Venous 7.42      POCT pCO2, Venous 47      POCT pO2, Venous 53 (*)     POCT SO2, Venous 89 (*)     POCT Oxy Hemoglobin, Venous 84.9 (*)     POCT Base Excess, Venous 5.1 (*)     POCT HCO3 Calculated, Venous 30.5 (*)     Patient Temperature        FiO2 100     POCT GLUCOSE - Abnormal    POCT Glucose 101 (*)    ACUTE TOXICOLOGY PANEL, BLOOD - Normal    Acetaminophen <10.0      Salicylate  <3      Alcohol <10     CREATINE KINASE - Normal    Creatine Kinase 139     SARS-COV-2 AND INFLUENZA A/B PCR - Normal    Flu A Result Not Detected      Flu B Result Not Detected      Coronavirus 2019, PCR Not Detected      Narrative:     This assay has received FDA Emergency Use Authorization (EUA) and  is only authorized for the duration of time that circumstances exist to justify the authorization of the emergency use of in vitro diagnostic tests for the detection of SARS-CoV-2 virus and/or diagnosis of COVID-19 infection under section 564(b)(1) of the Act, 21 U.S.C. 360bbb-3(b)(1). Testing for SARS-CoV-2 is only recommended for patients who meet current clinical and/or epidemiological criteria as defined by federal, state, or local public health directives. This assay is an in vitro diagnostic nucleic acid amplification test for the qualitative detection of SARS-CoV-2, Influenza A, and  Influenza B from nasopharyngeal specimens and has been validated for use at [facility]. Negative results do not preclude COVID-19 infections or Influenza A/B infections, and should not be used as the sole basis for diagnosis, treatment, or other management decisions. If Influenza A/B and RSV PCR results are negative, testing for Parainfluenza virus, Adenovirus and Metapneumovirus is routinely performed for [facility] pediatric oncology and intensive care inpatients, and is available on other patients by placing an add-on request.    URINALYSIS WITH REFLEX CULTURE AND MICROSCOPIC - Normal    Color, Urine Straw      Appearance, Urine Clear      Specific Gravity, Urine 1.005      pH, Urine 6.0      Protein, Urine NEGATIVE      Glucose, Urine NEGATIVE      Blood, Urine NEGATIVE      Ketones, Urine NEGATIVE      Bilirubin, Urine NEGATIVE      Urobilinogen, Urine <2.0      Nitrite, Urine NEGATIVE      Leukocyte Esterase, Urine NEGATIVE     MAGNESIUM - Normal    Magnesium 2.12     URINALYSIS WITH REFLEX CULTURE AND MICROSCOPIC    Narrative:     The following orders were created for panel order Urinalysis with Reflex Culture and Microscopic.  Procedure                               Abnormality         Status                     ---------                               -----------         ------                     Urinalysis with Reflex C...[[id number]]  Normal              Final result               Extra Urine Gray Tube[[id number]]                            Final result                 Please view results for these tests on the individual orders.   EXTRA URINE GRAY TUBE    Extra Tube Hold for add-ons.     POCT GLUCOSE METER        CT head wo IV contrast   Final Result   Unremarkable exam.   There has not been significant interval change from the prior exam.        Signed by: Christian Matt 2/13/2024 4:33 PM   Dictation workstation:   [id number]         MEDICALLY CLEARED, patient will be transferred to  [address] to admit application for emergency admission was completed     Priyanka Melo MD  02/14/24 2264

## 2024-03-18 ENCOUNTER — APPOINTMENT (OUTPATIENT)
Dept: RADIOLOGY | Facility: HOSPITAL | Age: 44
DRG: 065 | End: 2024-03-18
Payer: COMMERCIAL

## 2024-03-18 ENCOUNTER — APPOINTMENT (OUTPATIENT)
Dept: CARDIOLOGY | Facility: HOSPITAL | Age: 44
DRG: 065 | End: 2024-03-18
Payer: COMMERCIAL

## 2024-03-18 ENCOUNTER — HOSPITAL ENCOUNTER (INPATIENT)
Facility: HOSPITAL | Age: 44
LOS: 1 days | Discharge: HOME | DRG: 065 | End: 2024-03-21
Attending: STUDENT IN AN ORGANIZED HEALTH CARE EDUCATION/TRAINING PROGRAM | Admitting: INTERNAL MEDICINE
Payer: COMMERCIAL

## 2024-03-18 DIAGNOSIS — G45.9 TIA (TRANSIENT ISCHEMIC ATTACK): ICD-10-CM

## 2024-03-18 DIAGNOSIS — R29.90 STROKE-LIKE SYMPTOMS: Primary | ICD-10-CM

## 2024-03-18 DIAGNOSIS — R29.810 FACIAL DROOP: ICD-10-CM

## 2024-03-18 DIAGNOSIS — R47.81 SLURRED SPEECH: ICD-10-CM

## 2024-03-18 DIAGNOSIS — I63.9 ACUTE ISCHEMIC STROKE (MULTI): ICD-10-CM

## 2024-03-18 DIAGNOSIS — F19.10 SUBSTANCE ABUSE (MULTI): ICD-10-CM

## 2024-03-18 DIAGNOSIS — Q21.12 PFO (PATENT FORAMEN OVALE) (HHS-HCC): ICD-10-CM

## 2024-03-18 DIAGNOSIS — I63.311 CEREBROVASCULAR ACCIDENT (CVA) DUE TO THROMBOSIS OF RIGHT MIDDLE CEREBRAL ARTERY (MULTI): ICD-10-CM

## 2024-03-18 PROBLEM — R47.1 DYSARTHRIA: Status: ACTIVE | Noted: 2024-03-18

## 2024-03-18 PROBLEM — R53.1 LEFT-SIDED WEAKNESS: Status: ACTIVE | Noted: 2024-03-18

## 2024-03-18 LAB
ALBUMIN SERPL BCP-MCNC: 4.7 G/DL (ref 3.4–5)
ALP SERPL-CCNC: 60 U/L (ref 33–120)
ALT SERPL W P-5'-P-CCNC: 18 U/L (ref 10–52)
AMPHETAMINES UR QL SCN: ABNORMAL
ANION GAP SERPL CALC-SCNC: 15 MMOL/L (ref 10–20)
AORTIC VALVE MEAN GRADIENT: 4 MMHG
AORTIC VALVE PEAK VELOCITY: 1.4 M/S
AST SERPL W P-5'-P-CCNC: 15 U/L (ref 9–39)
AV PEAK GRADIENT: 7.8 MMHG
AVA (PEAK VEL): 2.77 CM2
AVA (VTI): 2.61 CM2
BARBITURATES UR QL SCN: ABNORMAL
BASOPHILS # BLD AUTO: 0.04 X10*3/UL (ref 0–0.1)
BASOPHILS NFR BLD AUTO: 0.5 %
BENZODIAZ UR QL SCN: ABNORMAL
BILIRUB SERPL-MCNC: 0.4 MG/DL (ref 0–1.2)
BUN SERPL-MCNC: 11 MG/DL (ref 6–23)
BZE UR QL SCN: ABNORMAL
CALCIUM SERPL-MCNC: 9.8 MG/DL (ref 8.6–10.3)
CANNABINOIDS UR QL SCN: ABNORMAL
CARDIAC TROPONIN I PNL SERPL HS: 3 NG/L (ref 0–20)
CARDIAC TROPONIN I PNL SERPL HS: <3 NG/L (ref 0–20)
CHLORIDE SERPL-SCNC: 101 MMOL/L (ref 98–107)
CHOLEST SERPL-MCNC: 94 MG/DL (ref 0–199)
CHOLESTEROL/HDL RATIO: 3
CO2 SERPL-SCNC: 26 MMOL/L (ref 21–32)
CREAT SERPL-MCNC: 0.95 MG/DL (ref 0.5–1.3)
EGFRCR SERPLBLD CKD-EPI 2021: >90 ML/MIN/1.73M*2
EJECTION FRACTION APICAL 4 CHAMBER: 56.8
EJECTION FRACTION: 56 %
EOSINOPHIL # BLD AUTO: 0.03 X10*3/UL (ref 0–0.7)
EOSINOPHIL NFR BLD AUTO: 0.4 %
ERYTHROCYTE [DISTWIDTH] IN BLOOD BY AUTOMATED COUNT: 15.5 % (ref 11.5–14.5)
EST. AVERAGE GLUCOSE BLD GHB EST-MCNC: 100 MG/DL
FENTANYL+NORFENTANYL UR QL SCN: ABNORMAL
GLUCOSE BLD MANUAL STRIP-MCNC: 103 MG/DL (ref 74–99)
GLUCOSE BLD MANUAL STRIP-MCNC: 120 MG/DL (ref 74–99)
GLUCOSE BLD MANUAL STRIP-MCNC: 122 MG/DL (ref 74–99)
GLUCOSE SERPL-MCNC: 174 MG/DL (ref 74–99)
HBA1C MFR BLD: 5.1 %
HCT VFR BLD AUTO: 48.3 % (ref 41–52)
HDLC SERPL-MCNC: 31.2 MG/DL
HGB BLD-MCNC: 15.5 G/DL (ref 13.5–17.5)
IMM GRANULOCYTES # BLD AUTO: 0.02 X10*3/UL (ref 0–0.7)
IMM GRANULOCYTES NFR BLD AUTO: 0.3 % (ref 0–0.9)
LDLC SERPL CALC-MCNC: 42 MG/DL
LEFT ATRIUM VOLUME AREA LENGTH INDEX BSA: 20.3 ML/M2
LEFT VENTRICLE INTERNAL DIMENSION DIASTOLE: 4.48 CM (ref 3.5–6)
LEFT VENTRICULAR OUTFLOW TRACT DIAMETER: 2.1 CM
LYMPHOCYTES # BLD AUTO: 1.59 X10*3/UL (ref 1.2–4.8)
LYMPHOCYTES NFR BLD AUTO: 20.5 %
MAGNESIUM SERPL-MCNC: 1.99 MG/DL (ref 1.6–2.4)
MCH RBC QN AUTO: 26.1 PG (ref 26–34)
MCHC RBC AUTO-ENTMCNC: 32.1 G/DL (ref 32–36)
MCV RBC AUTO: 81 FL (ref 80–100)
METHADONE UR QL SCN: ABNORMAL
MITRAL VALVE E/A RATIO: 1.45
MITRAL VALVE E/E' RATIO: 8.51
MONOCYTES # BLD AUTO: 0.77 X10*3/UL (ref 0.1–1)
MONOCYTES NFR BLD AUTO: 9.9 %
NEUTROPHILS # BLD AUTO: 5.3 X10*3/UL (ref 1.2–7.7)
NEUTROPHILS NFR BLD AUTO: 68.4 %
NON HDL CHOLESTEROL: 63 MG/DL (ref 0–149)
NRBC BLD-RTO: 0 /100 WBCS (ref 0–0)
OPIATES UR QL SCN: ABNORMAL
OXYCODONE+OXYMORPHONE UR QL SCN: ABNORMAL
PCP UR QL SCN: ABNORMAL
PLATELET # BLD AUTO: 229 X10*3/UL (ref 150–450)
POTASSIUM SERPL-SCNC: 3.5 MMOL/L (ref 3.5–5.3)
PROT SERPL-MCNC: 7.6 G/DL (ref 6.4–8.2)
RBC # BLD AUTO: 5.94 X10*6/UL (ref 4.5–5.9)
RIGHT VENTRICLE FREE WALL PEAK S': 13.9 CM/S
SODIUM SERPL-SCNC: 138 MMOL/L (ref 136–145)
TRICUSPID ANNULAR PLANE SYSTOLIC EXCURSION: 1.9 CM
TRIGL SERPL-MCNC: 105 MG/DL (ref 0–149)
VLDL: 21 MG/DL (ref 0–40)
WBC # BLD AUTO: 7.8 X10*3/UL (ref 4.4–11.3)

## 2024-03-18 PROCEDURE — 96372 THER/PROPH/DIAG INJ SC/IM: CPT | Performed by: STUDENT IN AN ORGANIZED HEALTH CARE EDUCATION/TRAINING PROGRAM

## 2024-03-18 PROCEDURE — 2500000004 HC RX 250 GENERAL PHARMACY W/ HCPCS (ALT 636 FOR OP/ED): Performed by: STUDENT IN AN ORGANIZED HEALTH CARE EDUCATION/TRAINING PROGRAM

## 2024-03-18 PROCEDURE — 84443 ASSAY THYROID STIM HORMONE: CPT

## 2024-03-18 PROCEDURE — 70551 MRI BRAIN STEM W/O DYE: CPT

## 2024-03-18 PROCEDURE — 2500000004 HC RX 250 GENERAL PHARMACY W/ HCPCS (ALT 636 FOR OP/ED): Performed by: INTERNAL MEDICINE

## 2024-03-18 PROCEDURE — 71045 X-RAY EXAM CHEST 1 VIEW: CPT | Performed by: RADIOLOGY

## 2024-03-18 PROCEDURE — 2500000002 HC RX 250 W HCPCS SELF ADMINISTERED DRUGS (ALT 637 FOR MEDICARE OP, ALT 636 FOR OP/ED)

## 2024-03-18 PROCEDURE — 83036 HEMOGLOBIN GLYCOSYLATED A1C: CPT | Mod: ELYLAB | Performed by: NURSE PRACTITIONER

## 2024-03-18 PROCEDURE — 36415 COLL VENOUS BLD VENIPUNCTURE: CPT

## 2024-03-18 PROCEDURE — 80061 LIPID PANEL: CPT | Performed by: NURSE PRACTITIONER

## 2024-03-18 PROCEDURE — 2500000004 HC RX 250 GENERAL PHARMACY W/ HCPCS (ALT 636 FOR OP/ED)

## 2024-03-18 PROCEDURE — G0378 HOSPITAL OBSERVATION PER HR: HCPCS

## 2024-03-18 PROCEDURE — 84484 ASSAY OF TROPONIN QUANT: CPT

## 2024-03-18 PROCEDURE — 93306 TTE W/DOPPLER COMPLETE: CPT

## 2024-03-18 PROCEDURE — 85025 COMPLETE CBC W/AUTO DIFF WBC: CPT

## 2024-03-18 PROCEDURE — 80053 COMPREHEN METABOLIC PANEL: CPT

## 2024-03-18 PROCEDURE — 99222 1ST HOSP IP/OBS MODERATE 55: CPT | Performed by: SPECIALIST

## 2024-03-18 PROCEDURE — 70551 MRI BRAIN STEM W/O DYE: CPT | Performed by: STUDENT IN AN ORGANIZED HEALTH CARE EDUCATION/TRAINING PROGRAM

## 2024-03-18 PROCEDURE — B246ZZ4 ULTRASONOGRAPHY OF RIGHT AND LEFT HEART, TRANSESOPHAGEAL: ICD-10-PCS | Performed by: INTERNAL MEDICINE

## 2024-03-18 PROCEDURE — 70496 CT ANGIOGRAPHY HEAD: CPT

## 2024-03-18 PROCEDURE — 97161 PT EVAL LOW COMPLEX 20 MIN: CPT | Mod: GP | Performed by: PHYSICAL THERAPIST

## 2024-03-18 PROCEDURE — 83735 ASSAY OF MAGNESIUM: CPT

## 2024-03-18 PROCEDURE — 70450 CT HEAD/BRAIN W/O DYE: CPT

## 2024-03-18 PROCEDURE — 99285 EMERGENCY DEPT VISIT HI MDM: CPT | Mod: 25

## 2024-03-18 PROCEDURE — 70498 CT ANGIOGRAPHY NECK: CPT

## 2024-03-18 PROCEDURE — 71045 X-RAY EXAM CHEST 1 VIEW: CPT

## 2024-03-18 PROCEDURE — 70496 CT ANGIOGRAPHY HEAD: CPT | Performed by: STUDENT IN AN ORGANIZED HEALTH CARE EDUCATION/TRAINING PROGRAM

## 2024-03-18 PROCEDURE — 70498 CT ANGIOGRAPHY NECK: CPT | Performed by: STUDENT IN AN ORGANIZED HEALTH CARE EDUCATION/TRAINING PROGRAM

## 2024-03-18 PROCEDURE — 99223 1ST HOSP IP/OBS HIGH 75: CPT | Performed by: STUDENT IN AN ORGANIZED HEALTH CARE EDUCATION/TRAINING PROGRAM

## 2024-03-18 PROCEDURE — 82947 ASSAY GLUCOSE BLOOD QUANT: CPT

## 2024-03-18 PROCEDURE — 93005 ELECTROCARDIOGRAM TRACING: CPT

## 2024-03-18 PROCEDURE — 2500000001 HC RX 250 WO HCPCS SELF ADMINISTERED DRUGS (ALT 637 FOR MEDICARE OP)

## 2024-03-18 PROCEDURE — 70450 CT HEAD/BRAIN W/O DYE: CPT | Performed by: RADIOLOGY

## 2024-03-18 PROCEDURE — 2550000001 HC RX 255 CONTRASTS: Performed by: STUDENT IN AN ORGANIZED HEALTH CARE EDUCATION/TRAINING PROGRAM

## 2024-03-18 PROCEDURE — 93306 TTE W/DOPPLER COMPLETE: CPT | Performed by: INTERNAL MEDICINE

## 2024-03-18 PROCEDURE — 80307 DRUG TEST PRSMV CHEM ANLYZR: CPT | Performed by: STUDENT IN AN ORGANIZED HEALTH CARE EDUCATION/TRAINING PROGRAM

## 2024-03-18 PROCEDURE — 96360 HYDRATION IV INFUSION INIT: CPT

## 2024-03-18 PROCEDURE — 92610 EVALUATE SWALLOWING FUNCTION: CPT | Mod: GN

## 2024-03-18 PROCEDURE — 97165 OT EVAL LOW COMPLEX 30 MIN: CPT | Mod: GO

## 2024-03-18 PROCEDURE — 2500000001 HC RX 250 WO HCPCS SELF ADMINISTERED DRUGS (ALT 637 FOR MEDICARE OP): Performed by: NURSE PRACTITIONER

## 2024-03-18 RX ORDER — DIAZEPAM 5 MG/1
5 TABLET ORAL ONCE
Status: COMPLETED | OUTPATIENT
Start: 2024-03-18 | End: 2024-03-18

## 2024-03-18 RX ORDER — HYDRALAZINE HYDROCHLORIDE 25 MG/1
25 TABLET, FILM COATED ORAL EVERY 6 HOURS PRN
Status: DISCONTINUED | OUTPATIENT
Start: 2024-03-20 | End: 2024-03-18

## 2024-03-18 RX ORDER — ENOXAPARIN SODIUM 100 MG/ML
40 INJECTION SUBCUTANEOUS EVERY 24 HOURS
Status: DISCONTINUED | OUTPATIENT
Start: 2024-03-18 | End: 2024-03-18

## 2024-03-18 RX ORDER — LABETALOL HYDROCHLORIDE 5 MG/ML
20 INJECTION, SOLUTION INTRAVENOUS ONCE
Status: DISCONTINUED | OUTPATIENT
Start: 2024-03-18 | End: 2024-03-18

## 2024-03-18 RX ORDER — ATORVASTATIN CALCIUM 80 MG/1
80 TABLET, FILM COATED ORAL NIGHTLY
Status: DISCONTINUED | OUTPATIENT
Start: 2024-03-18 | End: 2024-03-21 | Stop reason: HOSPADM

## 2024-03-18 RX ORDER — NICARDIPINE HYDROCHLORIDE 0.2 MG/ML
2.5-15 INJECTION INTRAVENOUS CONTINUOUS
Status: DISCONTINUED | OUTPATIENT
Start: 2024-03-18 | End: 2024-03-18

## 2024-03-18 RX ORDER — LORAZEPAM 2 MG/ML
1 INJECTION INTRAMUSCULAR ONCE
Status: COMPLETED | OUTPATIENT
Start: 2024-03-18 | End: 2024-03-18

## 2024-03-18 RX ORDER — IPRATROPIUM BROMIDE AND ALBUTEROL SULFATE 2.5; .5 MG/3ML; MG/3ML
3 SOLUTION RESPIRATORY (INHALATION) EVERY 6 HOURS PRN
Status: DISCONTINUED | OUTPATIENT
Start: 2024-03-18 | End: 2024-03-21 | Stop reason: HOSPADM

## 2024-03-18 RX ORDER — HYDRALAZINE HYDROCHLORIDE 20 MG/ML
5 INJECTION INTRAMUSCULAR; INTRAVENOUS ONCE
Status: DISCONTINUED | OUTPATIENT
Start: 2024-03-18 | End: 2024-03-18

## 2024-03-18 RX ORDER — HYDRALAZINE HYDROCHLORIDE 20 MG/ML
10 INJECTION INTRAMUSCULAR; INTRAVENOUS
Status: DISCONTINUED | OUTPATIENT
Start: 2024-03-18 | End: 2024-03-18

## 2024-03-18 RX ORDER — NAPROXEN SODIUM 220 MG/1
324 TABLET, FILM COATED ORAL ONCE
Status: COMPLETED | OUTPATIENT
Start: 2024-03-18 | End: 2024-03-18

## 2024-03-18 RX ORDER — POLYETHYLENE GLYCOL 3350 17 G/17G
17 POWDER, FOR SOLUTION ORAL DAILY
Status: DISCONTINUED | OUTPATIENT
Start: 2024-03-18 | End: 2024-03-21

## 2024-03-18 RX ORDER — ENOXAPARIN SODIUM 100 MG/ML
40 INJECTION SUBCUTANEOUS DAILY
Status: DISCONTINUED | OUTPATIENT
Start: 2024-03-18 | End: 2024-03-21 | Stop reason: HOSPADM

## 2024-03-18 RX ORDER — ACETAMINOPHEN 325 MG/1
975 TABLET ORAL EVERY 6 HOURS PRN
Status: DISCONTINUED | OUTPATIENT
Start: 2024-03-18 | End: 2024-03-21 | Stop reason: HOSPADM

## 2024-03-18 RX ORDER — ASPIRIN 81 MG/1
81 TABLET ORAL DAILY
Status: DISCONTINUED | OUTPATIENT
Start: 2024-03-19 | End: 2024-03-21 | Stop reason: HOSPADM

## 2024-03-18 RX ORDER — ASPIRIN 81 MG/1
81 TABLET ORAL DAILY
Status: DISCONTINUED | OUTPATIENT
Start: 2024-03-19 | End: 2024-03-18

## 2024-03-18 RX ADMIN — DIAZEPAM 5 MG: 5 TABLET ORAL at 02:11

## 2024-03-18 RX ADMIN — ACETAMINOPHEN 975 MG: 325 TABLET ORAL at 20:39

## 2024-03-18 RX ADMIN — SODIUM CHLORIDE 1000 ML: 9 INJECTION, SOLUTION INTRAVENOUS at 02:11

## 2024-03-18 RX ADMIN — LORAZEPAM 1 MG: 2 INJECTION INTRAMUSCULAR; INTRAVENOUS at 20:22

## 2024-03-18 RX ADMIN — IOHEXOL 75 ML: 350 INJECTION, SOLUTION INTRAVENOUS at 03:07

## 2024-03-18 RX ADMIN — ATORVASTATIN CALCIUM 80 MG: 80 TABLET, FILM COATED ORAL at 21:48

## 2024-03-18 RX ADMIN — ASPIRIN 324 MG: 81 TABLET, CHEWABLE ORAL at 03:48

## 2024-03-18 RX ADMIN — ENOXAPARIN SODIUM 40 MG: 40 INJECTION SUBCUTANEOUS at 08:35

## 2024-03-18 SDOH — SOCIAL STABILITY: SOCIAL INSECURITY: WERE YOU ABLE TO COMPLETE ALL THE BEHAVIORAL HEALTH SCREENINGS?: NO

## 2024-03-18 ASSESSMENT — COGNITIVE AND FUNCTIONAL STATUS - GENERAL
TOILETING: A LITTLE
PERSONAL GROOMING: A LITTLE
CLIMB 3 TO 5 STEPS WITH RAILING: A LOT
DAILY ACTIVITIY SCORE: 19
TOILETING: A LITTLE
PERSONAL GROOMING: A LITTLE
HELP NEEDED FOR BATHING: A LITTLE
TOILETING: A LITTLE
DRESSING REGULAR UPPER BODY CLOTHING: A LOT
HELP NEEDED FOR BATHING: A LOT
TURNING FROM BACK TO SIDE WHILE IN FLAT BAD: A LITTLE
MOBILITY SCORE: 16
DRESSING REGULAR LOWER BODY CLOTHING: A LITTLE
MOVING TO AND FROM BED TO CHAIR: A LOT
MOVING TO AND FROM BED TO CHAIR: A LOT
WALKING IN HOSPITAL ROOM: A LOT
MOVING TO AND FROM BED TO CHAIR: A LOT
HELP NEEDED FOR BATHING: A LITTLE
WALKING IN HOSPITAL ROOM: TOTAL
DRESSING REGULAR UPPER BODY CLOTHING: A LITTLE
CLIMB 3 TO 5 STEPS WITH RAILING: TOTAL
WALKING IN HOSPITAL ROOM: A LOT
CLIMB 3 TO 5 STEPS WITH RAILING: A LOT
DRESSING REGULAR UPPER BODY CLOTHING: A LITTLE
MOVING FROM LYING ON BACK TO SITTING ON SIDE OF FLAT BED WITH BEDRAILS: A LITTLE
DAILY ACTIVITIY SCORE: 19
PATIENT BASELINE BEDBOUND: NO
MOBILITY SCORE: 12
STANDING UP FROM CHAIR USING ARMS: A LOT
MOBILITY SCORE: 15
EATING MEALS: A LITTLE
STANDING UP FROM CHAIR USING ARMS: A LOT
TURNING FROM BACK TO SIDE WHILE IN FLAT BAD: A LITTLE
DRESSING REGULAR LOWER BODY CLOTHING: A LITTLE
STANDING UP FROM CHAIR USING ARMS: A LOT
PERSONAL GROOMING: A LITTLE
DAILY ACTIVITIY SCORE: 15
DRESSING REGULAR LOWER BODY CLOTHING: A LOT

## 2024-03-18 ASSESSMENT — ACTIVITIES OF DAILY LIVING (ADL)
PATIENT'S MEMORY ADEQUATE TO SAFELY COMPLETE DAILY ACTIVITIES?: YES
ADEQUATE_TO_COMPLETE_ADL: YES
LACK_OF_TRANSPORTATION: PATIENT DECLINED
DRESSING YOURSELF: NEEDS ASSISTANCE
JUDGMENT_ADEQUATE_SAFELY_COMPLETE_DAILY_ACTIVITIES: YES
BATHING_ASSISTANCE: MODERATE
HEARING - LEFT EAR: FUNCTIONAL
WALKS IN HOME: NEEDS ASSISTANCE
TOILETING: NEEDS ASSISTANCE
BATHING: NEEDS ASSISTANCE
FEEDING YOURSELF: NEEDS ASSISTANCE
GROOMING: NEEDS ASSISTANCE
HEARING - RIGHT EAR: FUNCTIONAL

## 2024-03-18 ASSESSMENT — PAIN SCALES - GENERAL
PAINLEVEL_OUTOF10: 0 - NO PAIN
PAINLEVEL_OUTOF10: 0 - NO PAIN
PAINLEVEL_OUTOF10: 7
PAINLEVEL_OUTOF10: 0 - NO PAIN
PAINLEVEL_OUTOF10: 0 - NO PAIN

## 2024-03-18 ASSESSMENT — LIFESTYLE VARIABLES
HAVE PEOPLE ANNOYED YOU BY CRITICIZING YOUR DRINKING: NO
EVER FELT BAD OR GUILTY ABOUT YOUR DRINKING: NO
EVER HAD A DRINK FIRST THING IN THE MORNING TO STEADY YOUR NERVES TO GET RID OF A HANGOVER: NO
HAVE YOU EVER FELT YOU SHOULD CUT DOWN ON YOUR DRINKING: NO

## 2024-03-18 ASSESSMENT — PAIN - FUNCTIONAL ASSESSMENT
PAIN_FUNCTIONAL_ASSESSMENT: UNABLE TO SELF-REPORT
PAIN_FUNCTIONAL_ASSESSMENT: 0-10

## 2024-03-18 ASSESSMENT — PAIN DESCRIPTION - LOCATION: LOCATION: HEAD

## 2024-03-18 ASSESSMENT — COLUMBIA-SUICIDE SEVERITY RATING SCALE - C-SSRS
2. HAVE YOU ACTUALLY HAD ANY THOUGHTS OF KILLING YOURSELF?: NO
2. HAVE YOU ACTUALLY HAD ANY THOUGHTS OF KILLING YOURSELF?: NO
1. IN THE PAST MONTH, HAVE YOU WISHED YOU WERE DEAD OR WISHED YOU COULD GO TO SLEEP AND NOT WAKE UP?: NO
6. HAVE YOU EVER DONE ANYTHING, STARTED TO DO ANYTHING, OR PREPARED TO DO ANYTHING TO END YOUR LIFE?: NO
1. IN THE PAST MONTH, HAVE YOU WISHED YOU WERE DEAD OR WISHED YOU COULD GO TO SLEEP AND NOT WAKE UP?: NO
6. HAVE YOU EVER DONE ANYTHING, STARTED TO DO ANYTHING, OR PREPARED TO DO ANYTHING TO END YOUR LIFE?: NO

## 2024-03-18 ASSESSMENT — VISUAL ACUITY: VA_NORMAL: 1

## 2024-03-18 NOTE — PROGRESS NOTES
"Occupational Therapy    Evaluation/Treatment    Patient Name: Jean-Pierre Stubbs \"Kirby\"  MRN: [id number]  : 1980  Today's Date: 24  Time Calculation  Start Time: 1335  Stop Time: 1348  Time Calculation (min): 13 min       Assessment:     OT Assessment Results: Decreased ADL status, Decreased upper extremity range of motion, Decreased upper extremity strength    Plan:  Treatment Interventions: ADL retraining, Functional transfer training, Endurance training, Neuromuscular reeducation  OT Frequency: 2 times per week  OT Discharge Recommendations: High intensity level of continued care  OT Recommended Transfer Status: Moderate assist, Assist of 2 (use of ww)  OT - OK to Discharge: Yes  Treatment Interventions: ADL retraining, Functional transfer training, Endurance training, Neuromuscular reeducation  Subjective     General:   OT Received On: 24  General  Reason for Referral: ADL impairment  Referred By: PT/OT 3/18/24 [place]  Past Medical History Relevant to Rehab: asthma, TIA, depression, anxiety, OA, IBS, + smoker  Family/Caregiver Present: Yes (GF)  Prior to Session Communication: Bedside nurse  General Comment: PT to ED 3/18 with lump in neck, SOB, L sided weakness, facial droop, declined TNK. A1C 5.1, + cannaboids, + cocaine, CT brain 3/18 negative. Neuro consult ordered, pt speech difficult to understanding, SLP eval pending    Precautions:  Medical Precautions: Fall precautions           Pain:  Pain Assessment  Pain Assessment: 0-10  Pain Score: 0 - No pain (denies pain initially, then reports increased pain in L knee and refuses to WB through L LE in standing.  Denies injury or fall to L knee)  Pain Type: Acute pain  Pain Location: Knee  Pain Orientation: Left  Objective     Cognition:  Overall Cognitive Status: Within Functional Limits (pt tearful, emotionally labile with mobility)  Safety/Judgement:  (impaired)             Home Living:  Home Living Comments: lives alone in basement of home, HR " on L side of steps going down to basement. Bed and bath with walk in shower in basement. No DME. Pt does cooking and med mgmt, GF does laundry. Kitchen on main level of home    Prior Function:  Hand Dominance: Right  Prior Function Comments: Pt does cooking and med mgmt, GF does laundry. Kitchen on main level of home. Pt indep with aDLS, denies falls. Amb without AD           Activities of Daily Living:   Eating Assistance: Stand by  Grooming Assistance: Minimal  Bathing Assistance: Moderate  UE Dressing Assistance: Minimal  LE Dressing Assistance: Maximal  Toileting Assistance with Device: Maximal  Functional Assistance:  (pt unable to ambulate or weight shift standing at EOB with B UE on ww)                         Activity Tolerance:  Endurance:  (pt fatigues after less than 5 minutes of functional activity)           Bed Mobility/Transfers: Bed Mobility  Bed Mobility:  (sup to sit CGA, slow transitioning, sit to sup CGA)  Transfers  Transfer:  (sit to stand at EOB Mod a x2, pt avoiding WB L LE)                Balance:  Static Sitting: good  Dynamic Sitting: fair -  Static Standing: fair -  Dynamic Standing: NT                        Vision:Vision - Basic Assessment  Current Vision: No visual deficits  Patient Visual Report:  (pt denies double vision, blurry vision, able to track and scan)        Sensation:  Pt reports diminished sensation to light touch in L UE and L LE       Strength:  Strength Comments: R UE 4+/5, L UE 2/5 at shoulder. Elbow, wrist and hand 4-/5           Coordination:  Finger to Target: Impaired       Extremities: RUE   RUE : Within Functional Limits and LUE   LUE: Within Functional Limits (L shoulder AROM limited to 90 flexion, L elbow, wrist and hand WFL)    Outcome Measures: [facility] Daily Activity  Putting on and taking off regular lower body clothing: A lot  Bathing (including washing, rinsing, drying): A lot  Putting on and taking off regular upper body clothing: A lot  Toileting, which  includes using toilet, bedpan or urinal: A little  Taking care of personal grooming such as brushing teeth: A little  Eating Meals: A little  Daily Activity - Total Score: 15    Education Documentation  ADL Training, taught by Barbara Durand OT at 3/18/2024  2:36 PM.  Learner: Significant Other, Patient  Readiness: Acceptance  Method: Explanation  Response: Verbalizes Understanding, Needs Reinforcement                 Goals:  Encounter Problems       Encounter Problems (Active)       ADLs       Pt will dress UB with modified indep (Progressing)       Start:  03/18/24    Expected End:  04/01/24            Pt will complete grooming tasks with SBA after set up (Progressing)       Start:  03/18/24    Expected End:  04/01/24               EXERCISE/STRENGTHENING       Pt will participate in neuro re-ed to faciliate normalized tone and functional use of L UE (Progressing)       Start:  03/18/24    Expected End:  04/01/24                   TRANSFERS   Pt will transfer to bed, chair, toielt with Erin x 1 (Progressing)       Start:  03/18/24    Expected End:  04/01/24

## 2024-03-18 NOTE — PROGRESS NOTES
Speech-Language Pathology                 Therapy Communication Note    Patient Name: Jean-Pierre Stubbs  MRN: [id number]  Today's Date: 3/18/2024     Discipline: Speech Language Pathology    Missed Visit Reason:  Attempted x 2 this afternoon.  Patient being seen by PT and OT and then was sleeping on second attempt.    Missed Time: Attempt    Comment: Patient was seen for swallow evaluation this a.m. to determine if he can safely tolerate upgrade in diet to p.o.  Attempted to return x 2 to complete speech-language eval with patient in early p.m. and later in afternoon.  PT/OT working with patient at first attempt and patient sleeping for second attempt.  Nurse tech reported that patient just fell asleep and that he was emotionally labile prior to falling asleep.  Will return at a later time/date to complete speech language evaluation.

## 2024-03-18 NOTE — CONSULTS
Consults    History Of Present Illness  Jean-Pierre Stubbs is a 43 y.o. male with a past medical history known for hypertension, anxiety, fatty liver, asthma, TIA December 2023, smoking, polysubstance use including marijuana and cocaine who presented to the hospital for further evaluation of sudden onset presenting with acute onset of left facial droop, slurred speech and left arm weakness and leg weakness      Patient states that he was at home drinking tea and smoking marijuana and crack cocaine and then felt a lump at the base of his throat that he felt like was getting bigger.  Felt very anxious.  Denied any difficulty swallowing.  While in the emergency department ED provider was called into the patient's room due to him exhibiting strokelike symptoms.  He was having left-sided facial droop and difficulty raising his left eyebrow as well as LLE weakness.  No weakness or sensory loss on the right.  CT and CTA revealed no intracranial hemorrhage or signs of infarction.  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  He was given aspirin.      He was offered IV TNK, but he declined having it despite his acute worsening of symptoms,.    He was admitted to the hospital for further evaluation.    During his hospital stay he continues to complain of left facial droop left arm and leg weakness.          Social History  Current smoker, uses cocaine and marijuana.       Past Medical History:   Diagnosis Date    Anxiety     Asthma     Chronic back pain     Depression     Diverticulitis     Fatty liver     IBS (irritable bowel syndrome)     TIA (transient ischemic attack)      Social History  Social History     Tobacco Use    Smoking status: Every Day     Types: Cigarettes    Smokeless tobacco: Current     Allergies  Tramadol  Medications Prior to Admission   Medication Sig Dispense Refill Last Dose    amLODIPine (Norvasc) 5 mg tablet Take 1 tablet (5 mg) by mouth once  daily. 30 tablet 0 Past Month       Review of Systems  Neurological Exam  Physical Exam  Problem List Items Addressed This Visit       TIA (transient ischemic attack)    Relevant Orders    Transthoracic Echo (TTE) Complete    * (Principal) Stroke-like symptoms - Primary    Relevant Orders    Transthoracic Echo (TTE) Complete     Other Visit Diagnoses       Substance abuse (CMS/HCC)        Slurred speech        Relevant Orders    Transthoracic Echo (TTE) Complete    Facial droop        Relevant Orders    Transthoracic Echo (TTE) Complete          Past Medical History:   Diagnosis Date    Anxiety     Asthma     Chronic back pain     Depression     Diverticulitis     Fatty liver     IBS (irritable bowel syndrome)     TIA (transient ischemic attack)      No past surgical history on file.  No family history on file.  Social History     Tobacco Use    Smoking status: Every Day     Types: Cigarettes    Smokeless tobacco: Current   Substance Use Topics    Alcohol use: Not on file      Allergies   Allergen Reactions    Tramadol Other     Tiredness        Medications:    Current Facility-Administered Medications:     [START ON 3/19/2024] aspirin EC tablet 81 mg, 81 mg, oral, Daily, Vinicio Huffman MD    atorvastatin (Lipitor) tablet 80 mg, 80 mg, oral, Nightly, MARIANNE Mckinney    enoxaparin (Lovenox) syringe 40 mg, 40 mg, subcutaneous, Daily, Eagle Michelle MD, 40 mg at 03/18/24 0835    LORazepam (Ativan) injection 1 mg, 1 mg, intravenous, Once, Vinicio Huffman MD    oxygen (O2) therapy, , inhalation, Continuous PRN - O2/gases, MARIANNE Mckinney    perflutren lipid microspheres (Definity) injection 0.5-10 mL of dilution, 0.5-10 mL of dilution, intravenous, Once in imaging, MARIANNE Mckinney    perflutren protein A microsphere (Optison) injection 0.5 mL, 0.5 mL, intravenous, Once in imaging, MARIANNE Mckinney    polyethylene glycol (Glycolax, Miralax) packet 17 g, 17 g, oral, Daily, Xenia  "ARELI Jackson-CNP    sulfur hexafluoride microsphr (Lumason) injection 24.28 mg, 2 mL, intravenous, Once in imaging, Xenia MARIANNE Jackson           Results:     Lab Results   Component Value Date    HGBA1C 5.1 03/18/2024       Lab Results   Component Value Date    CKTOTAL 139 02/13/2024     No results found for: \"SPEP\"  CBC:   Lab Results   Component Value Date    WBC 7.8 03/18/2024    HGB 15.5 03/18/2024    HCT 48.3 03/18/2024     03/18/2024     BMP:   Lab Results   Component Value Date     03/18/2024    K 3.5 03/18/2024     03/18/2024    CO2 26 03/18/2024    BUN 11 03/18/2024    CREATININE 0.95 03/18/2024    CALCIUM 9.8 03/18/2024    MG 1.99 03/18/2024     LFT:   Lab Results   Component Value Date    ALKPHOS 60 03/18/2024    BILITOT 0.4 03/18/2024    PROT 7.6 03/18/2024    ALBUMIN 4.7 03/18/2024    ALT 18 03/18/2024    AST 15 03/18/2024             Physical Exam:     /88 (BP Location: Left arm, Patient Position: Sitting)   Pulse 63   Temp 36.8 °C (98.2 °F) (Temporal)   Resp 14   Ht 1.753 m (5' 9\")   Wt 99.8 kg (220 lb)   SpO2 94%   BMI 32.49 kg/m²        GENERAL APPEARANCE:  No distress, alert and cooperative.     MENTAL STATE:  Orientation was normal to time, place and person.     CRANIAL NERVES:       CN 2- Visual fields full to confrontation.      CN 3, 4, 6-  Pupils round, 4 mm in diameter, equally reactive to light. No ptosis. EOMs normal alignment, full range of movement, no nystagmus     CN 5- Facial sensation intact bilaterally. Normal corneal reflexes.      CN 7-mild left facial droop affecting the lower aspect of the face.     CN 8- Hearing intact to finger rub, whisper.      CN 9- Palate elevates symmetrically. Normal gag reflex.      CN 11- Normal strength of shoulder shrug and neck turning      CN 12- Tongue midline, with normal bulk and strength; no fasciculations.  Mild slurred speech    MOTOR: Mild weakness affecting the left upper and lower extremities in the " upper motor neuron distribution (affecting the deltoid, triceps, wrist extensors, hip flexor , knee flexor foot dorsiflexor)    REFLEXES:  Right/ Left:  Biceps 2/2, brachioradialis 2/2, triceps 2/2, patellar 2/2, ankle 2/2  Babinski: toes downgoing to plantar stimulation. No clonus, frontal release signs or other pathologic reflexes present.     SENSORY: Sensory exam was intact to light touch, sharp/dull, vibration and position sense in both UE and LE.     COORDINATION: SURAJ were intact in upper and lower extremities.  In UE- finger-nose-finger was intact and in LE- heel-to-shin was intact without dysmetria or overshoot.      GAIT: not tested.          Relevant Results        NIH Stroke Scale  1A. Level of Consciousness: Alert, Keenly Responsive  1B. Ask Month and Age: Both Questions Right  1C. Blink Eyes & Squeeze Hands: Performs Both Tasks  2. Best Gaze: Normal  3. Visual: No Visual Loss  4. Facial Palsy: Partial Paralysis  5A. Motor - Left Arm: Some Effort Against Gravity  5B. Motor - Right Arm: No Drift  6A. Motor - Left Leg: Some Effort Against Gravity  6B. Motor - Right Leg: No Drift  7. Limb Ataxia: Absent  8. Sensory Loss: Normal  9. Best Language: No Aphasia  10. Dysarthria: Mild-to-Moderate Dysarthria  11. Extinction and Inattention: No Abnormality  NIH Stroke Scale: 7           Stefanie Coma Scale  Best Eye Response: Spontaneous  Best Verbal Response: Oriented  Best Motor Response: Follows commands  Stefanie Coma Scale Score: 15                 I have personally reviewed the following imaging results ECG 12 lead    Result Date: 3/18/2024  Accelerated Junctional rhythm Abnormal ECG When compared with ECG of 13-FEB-2024 21:43, Junctional rhythm has replaced Sinus rhythm Vent. rate has increased BY  71 BPM QRS duration has decreased ST no longer elevated in Lateral leads Nonspecific T wave abnormality now evident in Anterior leads See ED provider note for full interpretation and clinical correlation    CT angio  brain attack head w IV contrast and post procedure    Result Date: 3/18/2024  Interpreted By:  Yasmany Smalls, STUDY: CT BRAIN ATTACK HEAD WO IV CONTRAST; CT ANGIO BRAIN ATTACK HEAD W IV CONTRAST AND POST PROCEDURE; CT ANGIO BRAIN ATTACK NECK W IV CONTRAST AND POST PROCEDURE;  3/18/2024 3:00 am; 3/18/2024 3:08 am   INDICATION: Signs/Symptoms:Stroke vs; Signs/Symptoms:facial drooping   COMPARISON: Noncontrast head CT 02/13/2024   ACCESSION NUMBER(S): [id number]; [id number]; [id number]   ORDERING CLINICIAN: FARZANA MIX   TECHNIQUE: Multiple contiguous axial noncontrast images of the head were obtained. Following IV contrast administration of  mL Omnipaque 350, a CT angiography of the head and neck was performed. MIPS and 3D reconstructions of the [place] and neck were created on an independent workstation and reviewed.   FINDINGS: NON-CONTRAST HEAD CT:   BRAIN PARENCHYMA:  No evidence of acute intraparenchymal hemorrhage or parenchymal evidence of an acute large territory ischemic infarct. No mass-effect, midline shift or effacement of cerebral sulci. Gray-white matter distinction is preserved.   VENTRICLES and EXTRA-AXIAL SPACES:  No acute extra-axial or intraventricular hemorrhage. Ventricles and sulci are age-concordant.   PARANASAL SINUSES/MASTOIDS:  No hemorrhage or air-fluid levels within the visualized paranasal sinuses. The mastoids are well aerated.   CALVARIUM/ORBITS:  No skull fracture.  The orbits and globes are intact to the extent visualized.   EXTRACRANIAL SOFT TISSUES: No discernible abnormality.       CTA NECK:       LEFT VERTEBRAL ARTERY:  No hemodynamically significant stenosis, occlusion, or dissection.   LEFT COMMON/INTERNAL CAROTID ARTERY:  No hemodynamically significant stenosis, occlusion, or dissection.   RIGHT VERTEBRAL ARTERY: Non dominant. No hemodynamically significant stenosis, occlusion, or dissection.   RIGHT COMMON/INTERNAL CAROTID ARTERY: There is a focal saccular  outpouching along the lateral wall of the right ICA measuring 0.3 cm x 0.2 cm consistent with a pseudoaneurysm (coronal MIP image 60, series 408). This is located 0.7 cm from the skull base. There is no hemodynamically significant stenosis or atherosclerosis of the extracranial right ICA or right CCA.     The neck soft tissues show no evidence of mass, fluid collection, or enlarged lymph nodes. There is no acute osseous abnormality.       CTA HEAD:   ANTERIOR CIRCULATION: No aneurysm.   - Internal Carotid Arteries:  No hemodynamically significant stenosis or occlusion.   - Middle Cerebral Arteries:  No hemodynamically significant stenosis or occlusion.   - Anterior Cerebral Arteries:  No hemodynamically significant stenosis or occlusion.     POSTERIOR CIRCULATION: No aneurysm.   - Intracranial Vertebral Arteries:  No hemodynamically significant stenosis or occlusion.   - Basilar Artery: Diminutive in caliber owing to fetal origin of bilateral posterior cerebral arteries. No hemodynamically significant stenosis or occlusion.   - Posterior Cerebral Arteries: There is fetal origin of the bilateral posterior cerebral arteries. No hemodynamically significant stenosis or occlusion.   No arteriovenous malformation is visualized. No pathologic intracranial enhancement or discrete mass. The dural venous sinuses are patent.   MIPS and 3D reconstructions confirm the above findings.       1. No evidence of an acute ischemic infarct, acute intracranial hemorrhage, or mass effect.   2. 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 No associated dissection flap, mural thickening, or significant stenosis.   3. The remainder of the CTA head and neck is unremarkable.     MACRO: Yasmany Smalls discussed the significance and urgency of this critical finding by NIKKIE ANGEL with  FARZANA MIX on 3/18/2024 at 3:19 am with confirmation of receipt .  (**-RCF-**) Findings:   See findings.   Signed by: Yasmany Smalls 3/18/2024 3:19 AM Dictation workstation:   [id number]    CT angio brain attack neck w IV contrast and post procedure    Result Date: 3/18/2024  Interpreted By:  Yasmany Smalls, STUDY: CT BRAIN ATTACK HEAD WO IV CONTRAST; CT ANGIO BRAIN ATTACK HEAD W IV CONTRAST AND POST PROCEDURE; CT ANGIO BRAIN ATTACK NECK W IV CONTRAST AND POST PROCEDURE;  3/18/2024 3:00 am; 3/18/2024 3:08 am   INDICATION: Signs/Symptoms:Stroke vs; Signs/Symptoms:facial drooping   COMPARISON: Noncontrast head CT 02/13/2024   ACCESSION NUMBER(S): [id number]; [id number]; [id number]   ORDERING CLINICIAN: FARZANA MIX   TECHNIQUE: Multiple contiguous axial noncontrast images of the head were obtained. Following IV contrast administration of  mL Omnipaque 350, a CT angiography of the head and neck was performed. MIPS and 3D reconstructions of the [place] and neck were created on an independent workstation and reviewed.   FINDINGS: NON-CONTRAST HEAD CT:   BRAIN PARENCHYMA:  No evidence of acute intraparenchymal hemorrhage or parenchymal evidence of an acute large territory ischemic infarct. No mass-effect, midline shift or effacement of cerebral sulci. Gray-white matter distinction is preserved.   VENTRICLES and EXTRA-AXIAL SPACES:  No acute extra-axial or intraventricular hemorrhage. Ventricles and sulci are age-concordant.   PARANASAL SINUSES/MASTOIDS:  No hemorrhage or air-fluid levels within the visualized paranasal sinuses. The mastoids are well aerated.   CALVARIUM/ORBITS:  No skull fracture.  The orbits and globes are intact to the extent visualized.   EXTRACRANIAL SOFT TISSUES: No discernible abnormality.       CTA NECK:       LEFT VERTEBRAL ARTERY:  No hemodynamically significant stenosis, occlusion, or dissection.   LEFT COMMON/INTERNAL CAROTID ARTERY:  No hemodynamically significant stenosis, occlusion, or dissection.   RIGHT VERTEBRAL ARTERY: Non dominant. No hemodynamically  significant stenosis, occlusion, or dissection.   RIGHT COMMON/INTERNAL CAROTID ARTERY: There is a focal saccular outpouching along the lateral wall of the right ICA measuring 0.3 cm x 0.2 cm consistent with a pseudoaneurysm (coronal MIP image 60, series 408). This is located 0.7 cm from the skull base. There is no hemodynamically significant stenosis or atherosclerosis of the extracranial right ICA or right CCA.     The neck soft tissues show no evidence of mass, fluid collection, or enlarged lymph nodes. There is no acute osseous abnormality.       CTA HEAD:   ANTERIOR CIRCULATION: No aneurysm.   - Internal Carotid Arteries:  No hemodynamically significant stenosis or occlusion.   - Middle Cerebral Arteries:  No hemodynamically significant stenosis or occlusion.   - Anterior Cerebral Arteries:  No hemodynamically significant stenosis or occlusion.     POSTERIOR CIRCULATION: No aneurysm.   - Intracranial Vertebral Arteries:  No hemodynamically significant stenosis or occlusion.   - Basilar Artery: Diminutive in caliber owing to fetal origin of bilateral posterior cerebral arteries. No hemodynamically significant stenosis or occlusion.   - Posterior Cerebral Arteries: There is fetal origin of the bilateral posterior cerebral arteries. No hemodynamically significant stenosis or occlusion.   No arteriovenous malformation is visualized. No pathologic intracranial enhancement or discrete mass. The dural venous sinuses are patent.   MIPS and 3D reconstructions confirm the above findings.       1. No evidence of an acute ischemic infarct, acute intracranial hemorrhage, or mass effect.   2. 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 No associated dissection flap, mural thickening, or significant stenosis.   3. The remainder of the CTA head and neck is unremarkable.     MACRO: Yasmany Smalls discussed the significance and urgency of this critical  finding by [facility] with  FARZANA MIX on 3/18/2024 at 3:19 am with confirmation of receipt .  (**-RCF-**) Findings:  See findings.   Signed by: Yasmany Smalls 3/18/2024 3:19 AM Dictation workstation:   [id number]    CT brain attack head wo IV contrast    Result Date: 3/18/2024  Interpreted By:  Yasmany Smalls, STUDY: CT BRAIN ATTACK HEAD WO IV CONTRAST; CT ANGIO BRAIN ATTACK HEAD W IV CONTRAST AND POST PROCEDURE; CT ANGIO BRAIN ATTACK NECK W IV CONTRAST AND POST PROCEDURE;  3/18/2024 3:00 am; 3/18/2024 3:08 am   INDICATION: Signs/Symptoms:Stroke vs; Signs/Symptoms:facial drooping   COMPARISON: Noncontrast head CT 02/13/2024   ACCESSION NUMBER(S): [id number]; [id number]; [id number]   ORDERING CLINICIAN: FARZANA MIX   TECHNIQUE: Multiple contiguous axial noncontrast images of the head were obtained. Following IV contrast administration of  mL Omnipaque 350, a CT angiography of the head and neck was performed. MIPS and 3D reconstructions of the [place] of [place] and neck were created on an independent workstation and reviewed.   FINDINGS: NON-CONTRAST HEAD CT:   BRAIN PARENCHYMA:  No evidence of acute intraparenchymal hemorrhage or parenchymal evidence of an acute large territory ischemic infarct. No mass-effect, midline shift or effacement of cerebral sulci. Gray-white matter distinction is preserved.   VENTRICLES and EXTRA-AXIAL SPACES:  No acute extra-axial or intraventricular hemorrhage. Ventricles and sulci are age-concordant.   PARANASAL SINUSES/MASTOIDS:  No hemorrhage or air-fluid levels within the visualized paranasal sinuses. The mastoids are well aerated.   CALVARIUM/ORBITS:  No skull fracture.  The orbits and globes are intact to the extent visualized.   EXTRACRANIAL SOFT TISSUES: No discernible abnormality.       CTA NECK:       LEFT VERTEBRAL ARTERY:  No hemodynamically significant stenosis, occlusion, or dissection.   LEFT COMMON/INTERNAL CAROTID ARTERY:  No hemodynamically significant  stenosis, occlusion, or dissection.   RIGHT VERTEBRAL ARTERY: Non dominant. No hemodynamically significant stenosis, occlusion, or dissection.   RIGHT COMMON/INTERNAL CAROTID ARTERY: There is a focal saccular outpouching along the lateral wall of the right ICA measuring 0.3 cm x 0.2 cm consistent with a pseudoaneurysm (coronal MIP image 60, series 408). This is located 0.7 cm from the skull base. There is no hemodynamically significant stenosis or atherosclerosis of the extracranial right ICA or right CCA.     The neck soft tissues show no evidence of mass, fluid collection, or enlarged lymph nodes. There is no acute osseous abnormality.       CTA HEAD:   ANTERIOR CIRCULATION: No aneurysm.   - Internal Carotid Arteries:  No hemodynamically significant stenosis or occlusion.   - Middle Cerebral Arteries:  No hemodynamically significant stenosis or occlusion.   - Anterior Cerebral Arteries:  No hemodynamically significant stenosis or occlusion.     POSTERIOR CIRCULATION: No aneurysm.   - Intracranial Vertebral Arteries:  No hemodynamically significant stenosis or occlusion.   - Basilar Artery: Diminutive in caliber owing to fetal origin of bilateral posterior cerebral arteries. No hemodynamically significant stenosis or occlusion.   - Posterior Cerebral Arteries: There is fetal origin of the bilateral posterior cerebral arteries. No hemodynamically significant stenosis or occlusion.   No arteriovenous malformation is visualized. No pathologic intracranial enhancement or discrete mass. The dural venous sinuses are patent.   MIPS and 3D reconstructions confirm the above findings.       1. No evidence of an acute ischemic infarct, acute intracranial hemorrhage, or mass effect.   2. 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 No associated dissection flap, mural thickening, or significant stenosis.   3. The remainder of the CTA head and neck is  unremarkable.     MACRO: Yasmany Smalls discussed the significance and urgency of this critical finding by NIKKIE ANGEL with  FARZANA MIX on 3/18/2024 at 3:19 am with confirmation of receipt .  (**-RCF-**) Findings:  See findings.   Signed by: Yasmany Smalls 3/18/2024 3:19 AM Dictation workstation:   [id number]    XR chest 1 view    Result Date: 3/18/2024  Interpreted By:  Madelaine Walker, STUDY: XR CHEST 1 VIEW;  3/18/2024 2:17 am   INDICATION: Signs/Symptoms:CP   COMPARISON: Chest x-ray 06/24/2014   ACCESSION NUMBER(S): [id number]   ORDERING CLINICIAN: FARZANA MIX   TECHNIQUE: 2 portable frontal views of the chest were obtained.   FINDINGS: The cardiomediastinal silhouette is within normal limits.   No focal consolidation, pleural effusion or pneumothorax.         1.  No radiographic evidence of acute cardiopulmonary process.       MACRO: None.   Signed by: Madelaine Walker 3/18/2024 2:51 AM Dictation workstation:   [id number]       Assessment/Plan   Principal Problem:    Stroke-like symptoms  Active Problems:    Left-sided weakness    Dysarthria    Acute onset of left hemiparesis, facial droop, and a stroke scale is 7 likely related to right internal capsule stroke by history of hypertension, cocaine use.  NIH stroke scale is 7  Patient refused IV TNK treatment.  I agree with stroke workup as already initiated, including:    MRI of the brain, transthoracic echocardiogram,  lipid profile, hemoglobin A1c    Aspirin 81 mg p.o. daily    Cardiac telemetry    PT/ OT evaluation    To allow permissive hypertension for now        I spent 60  minutes in the professional and overall care of this patient.      Chasity Pandya MD

## 2024-03-18 NOTE — NURSING NOTE
1820: Patient vomited x2. Message sent to Dr. Huffman. Stat CT ordered. Family at bedside.    1845: Patient complaining of severe headache. Dr. Huffman notified. Call out to CT to get patient down as soon as possible.     1905: Patient taken to CT by floor staff. Tolerated well.

## 2024-03-18 NOTE — SIGNIFICANT EVENT
Pt seen and examined. See H&P. Still has L facial droop, dysarthria, L sided weakness. Awaiting MRI brain, TTE. Neuro consulted. On ASA, statin.

## 2024-03-18 NOTE — PROGRESS NOTES
Physical Therapy    Physical Therapy Evaluation    Patient Name: Jean-Pierre Stubbs  MRN: [id number]  Today's Date: 3/18/2024   Time Calculation  Start Time: 1326  Stop Time: 1352  Time Calculation (min): 12 min    Assessment/Plan   PT Assessment  PT Assessment Results: Decreased strength, Impaired balance, Decreased mobility (Patient demonstrates inconsisancies in performance throughout evaluation. Note functional mobility suggests more strength, than patient demonstrates on MMT)  Rehab Prognosis: Good  Evaluation/Treatment Tolerance: Patient limited by fatigue  Assessment Comment: Patient will benefit from additional PT to increase strenght, mobility and activity tolerance.  End of Session Patient Position: Bed, 2 rail up, Alarm off, not on at start of session  IP OR SWING BED PT PLAN  Inpatient or Swing Bed: Inpatient  PT Plan  Treatment/Interventions: Transfer training, Gait training, Balance training, Stair training, Strengthening, Therapeutic activity  PT Plan: Skilled PT  PT Frequency: 4 times per week  PT Discharge Recommendations:  (High intensity and high frequency with 24/7 care)  Equipment Recommended upon Discharge:  (TBD)  PT Recommended Transfer Status: Assist x2  PT - OK to Discharge: (once deemed medically appropriate to 24/7 care faility with continued PT)      Subjective   General Visit Information:  General  Reason for Referral: Impaired mobility  Referred By: PT/OT 3/18/24 [place]  Past Medical History Relevant to Rehab: Asthma, TIA, Depression, Anxiety, OA, IBS + smoker  Prior to Session Communication: Bedside nurse  Patient Position Received: Bed, 2 rail up, Alarm off, not on at start of session  General Comment: To ED 3/18/24 for lump in throat after smoking marijuana and drinking. C/o SOB. Noted left sided weakness, facial droop. Declined TNK. + cannibinoids/cocaine; CT 3/18/24 Brain (-); MRI Brain pending  Home Living:  Home Living  Home Living Comments: Per patient report resdies alone in 1 story  home with basement. Bed/bathroom in basement with handrail on left descending. 2 steps into room without handrail. Walk in shower no seat no grab bar. NO DME  Prior Level of Function:  Prior Function Per Pt/Caregiver Report  Hand Dominance: Right  Prior Function Comments: Per patient and girlfriend patient independent in mobility, ADLs, and IADLs without assistive device. Denies any falls. Girlfriend states she is at the house more than  she is not  Precautions:  Precautions  Hearing/Visual Limitations: Denies any visual changes, but demonstrates poor tracking. Patient very lethargic and not following commands well  Medical Precautions: Fall precautions, Insensate limb precautions  Vital Signs:       Objective   Pain:  Pain Assessment  Pain Score:  (When patient stood adopts NWB posture by holding left LE up in hip/knee flexion. Nods when asked if he was having pain. Points to left knee but unable to rate pain)  Cognition:  Cognition  Overall Cognitive Status:  (Lethargic)    General Assessments:  General Observation  General Observation: Patient lying in bed. Girlfriend present. Assists with information. Patient's speech very garbled               Activity Tolerance  Endurance:  (Poor)    Sensation  Light Touch: Partial deficits in the LUE, Partial deficits in the LLE (Per patient report)  Sensation Comment: Unable to fully assess due to patient's lethargy and speech deficits    Coordination  Movements are Fluid and Coordinated:  (slow deliberate)  Alternating Toe Taps: Intact  Heel to Shin: Impaired  Finger to Target:  (Patient able to get left heel to right shin but slow moving.)         Static Sitting Balance  Static Sitting-Comment/Number of Minutes: Fair +  Dynamic Sitting Balance  Dynamic Sitting-Comments: Fair    Static Standing Balance  Static Standing-Comment/Number of Minutes: Fair with ww  Dynamic Standing Balance  Dynamic Standing-Comments: Poor with ww  Functional Assessments:  Bed Mobility  Bed  Mobility:  (Supine ,<> side lying <> sit slow transitioning CGA.. requires CGA to ensure left LE gets back up into bed.)    Transfers  Transfer:  (Sit > stand at bed level + 2 moderate assist, vc/tc to push up from bed. Tends to adopt NWB posture in stand. Encouraged to put LLE up onto floor, then c/o severe pain, crying.)    Ambulation/Gait Training  Ambulation/Gait Training Performed:  (Patient does not demonstrate sufficient control of LLE to attempt step this date.)  Extremity/Trunk Assessments:  RUE   RUE : Within Functional Limits  LUE   LUE:  (AAROM shoulder WFL; AROM elbow/wrist/hand WFL)  RLE   RLE :  (AROM Hip/knee/ankle WFL mMT 5/5 grossly)  LLE   LLE :  (AROM Hip/knee/ankle WFL mMT hip 2+/5; knee extension 3-/5; ankle dorsiflexion 2/5 (however able to demonstrate toe tapping with coordination and heel to shin. Also adopted flexed hip/knee fflexion posture in stand))  Outcome Measures:  [facility] Basic Mobility  Turning from your back to your side while in a flat bed without using bedrails: A little  Moving from lying on your back to sitting on the side of a flat bed without using bedrails: A little  Moving to and from bed to chair (including a wheelchair): A lot  Standing up from a chair using your arms (e.g. wheelchair or bedside chair): A lot  To walk in hospital room: Total  Climbing 3-5 steps with railing: Total  Basic Mobility - Total Score: 12    Encounter Problems       Encounter Problems (Active)       PT Problem       Bed mobility independent  (Progressing)       Start:  03/18/24    Expected End:  04/15/24            Transfers sit <> stand with SPC CGA (Progressing)       Start:  03/18/24    Expected End:  04/15/24            Patient to ambulate with SPC 30' + 1 minimal assist (Progressing)       Start:  03/18/24    Expected End:  04/15/24            Patient to negotiate 10 steps with single handrail and cane + 1 minimal assist (Not Progressing)       Start:  03/18/24    Expected End:  04/15/24                                     Education Documentation  Mobility Training, taught by Ayesha Roman, PT at 3/18/2024  2:40 PM.  Learner: Significant Other, Patient  Readiness: Acceptance  Method: Demonstration, Explanation  Response: Needs Reinforcement    Education Comments  No comments found.

## 2024-03-18 NOTE — PROGRESS NOTES
"Received social work consult for substance abuse.  Pt. Tox. Screen positive for cannabis and cocaine.  Met with pt.,  Girlfriend at bedside, pt. Gives permission to speak while girlfriend present.  When asked questions  about his use pt. Closed his eyes girlfriend states he is \"out of it\", states she does not know how long he has been using.  Substance abuse programs discussed and provided packet.  Girlfriend was accepting of info. States they will look through.  Social work available if needed further.          "

## 2024-03-18 NOTE — H&P
Medical Group History and Physical      ASSESSMENT & PLAN:     #. Left sided weakness, left facial droop, and dysarthria 2/2 CVA vs TIA  -Developed left facial droop which progressed to left leg weakness while in the ED, now with some left arm weakness as well  -CT head and CT angio negative for intracranial hemorrhage or obvious signs of infarction  -Given worsening of symptoms, TNK was offered, patient declined. Please see ED provider documentation for further details regarding shared decision making.  -Admit to general medical floor  -Permissive hypertension  -MRI brain  -Neurochecks and NIH scale per stroke order set  -Neurology consult  -Echocardiogram  -NPO diet  -High intensity statin  -Check A1c and lipid panel  -PT/OT    VTE PPX: Lovenox      Eagle Michelle MD    --Of note, this documentation is completed using the Dragon Dictation system (voice recognition software). There may be spelling and/or grammatical errors that were not corrected prior to final submission.--    HISTORY OF PRESENT ILLNESS:   Chief Complaint: Left leg weakness    Jean-Pierre Stubbs is a 43 y.o. male history of hypertension, asthma, previous TIA in December 2023 presenting with complaints of a lump in his throat and shortness of breath.  Patient states that he was at home drinking tea and smoking marijuana and crack cocaine and then felt a lump at the base of his throat that he felt like was getting bigger.  Felt very anxious.  Denied any difficulty swallowing.  While in the emergency department ED provider was called into the patient's room due to him exhibiting strokelike symptoms.  He was having left-sided facial droop and difficulty raising his left eyebrow as well as LLE weakness.  No weakness or sensory loss on the right.  CT and CTA revealed no intracranial hemorrhage or signs of infarction.  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  He was given aspirin.  Stroke  attending at [facility] was contacted by ED who advised the patient would not be an appropriate candidate for TNK or embolectomy at this time.  Later in the early morning, patient was noted to have worsening left leg weakness and reduced sensation.  His dysarthria and facial droop was noted to be similar to when he originally started having strokelike symptoms.  Given his acute worsening of symptoms, TNK was offered by ED physician.  Following explanation of the risks and benefits of TNK, patient declined TNK.  Will be admitted for further stroke workup.       ROS  10 point review of systems negative except per HPI     PAST HISTORIES:     Past Medical History  He has a past medical history of Anxiety, Asthma, Chronic back pain, Depression, Diverticulitis, Fatty liver, IBS (irritable bowel syndrome), and TIA (transient ischemic attack).    Surgical History  Elbow surgery     Social History  Current smoker, uses cocaine and marijuana.    Family History  Unable to obtain.    Allergies:  Tramadol      OBJECTIVE:      Last Recorded Vitals  BP (!) 168/95   Pulse 82   Temp 36.6 °C (97.9 °F) (Temporal)   Resp 17   Wt 99.8 kg (220 lb)   SpO2 98%     Last I/O:  No intake/output data recorded.    Physical Exam   Gen: appears anxious and tearful, appears stated age  HEENT: EOM, MMM  CV: RRR, no murmurs rubs or gallops  Resp: Clear to auscultation bilaterally, normal effort  Abdomen: soft, NT,+BS  LE: No edema, no deformity  Neuro: see below      BRIEF BRAIN ATTACK/STROKE ALERT DOCUMENTATION:     History/Exam limitations: mental status.   Additional history was obtained from spouse/SO.    SUBJECTIVE: see HPI above    Last Known Well Time: 2:30AM on 3/18/24        ------------------------------------------------------------------------------------------------------------------------------------------     Physical Exam:    Vitals:    03/18/24 0540   BP: (!) 168/95   Pulse: 82   Resp:    Temp:    SpO2: 98%        Neurological Exam  Mental Status  Drowsy. Oriented to person, place and time. Moderate dysarthria present. Attention and concentration: Slightly delayed responses to questions.. Fund of knowledge is appropriate for level of education.    Cranial Nerves  CN II: Visual acuity is normal. Visual fields full to confrontation.  CN III, IV, VI: Extraocular movements intact bilaterally. Normal lids and orbits bilaterally. Pupils equal round and reactive to light bilaterally.  CN V: Facial sensation is normal.  CN VII:  Left: There is central facial weakness.  CN VIII: Hearing is normal.  CN IX, X: Palate elevates symmetrically  CN XI: Shoulder shrug strength is normal.  CN XII: Tongue midline without atrophy or fasciculations.    Motor  Normal muscle bulk throughout. Normal muscle tone. No pronator drift.                                             Right                     Left  Elbow flexion                         5                          4  Elbow extension                    5                          4  Hip flexion                              5                          4  Plantarflexion                         5                          4  Dorsiflexion                            5                          4    Sensory  Light touch abnormality: Normal in RLE, diminished in LLE. Pinprick abnormality: Normal in RLE, diminished in LLE.     Coordination  Right: Finger-to-nose normal.Left: Finger-to-nose normal.      Interval:  4 hours post last known well time  Time: 6:15 AM  Person Administering Scale: Eagle Michelle MD     1a  Level of consciousness: 1=not alert but arousable by minor stimulation to obey, answer or respond   1b. LOC questions:  0=Performs both tasks correctly   1c. LOC commands: 0=Performs both tasks correctly   2.  Best Gaze: 0=normal   3. Visual: 0=No visual loss   4. Facial Palsy: 1=Minor paralysis (flattened nasolabial fold, asymmetric on smiling)   5a. Motor left arm: 0=No drift, limb holds  90 (or 45) degrees for full 10 seconds   5b.  Motor right arm: 0=No drift, limb holds 90 (or 45) degrees for full 10 seconds   6a. motor left le=Some effort against gravity, limb cannot get to or maintain (if cured) 90 (or 45) degrees, drifts down to bed, but has some effort against gravity   6b  Motor right le=No drift, limb holds 90 (or 45) degrees for full 10 seconds   7. Limb Ataxia: 1=Present in one limb   8.  Sensory: 1=Mild to moderate sensory loss; patient feels pinprick is less sharp or is dull on the affected side; there is a loss of superficial pain with pinprick but patient is aware He is being touched   9. Best Language:  0=No aphasia, normal   10. Dysarthria: 1=Mild to moderate, patient slurs at least some words and at worst, can be understood with some difficulty   11. Extinction and Inattention: 1=Visual, tactile, auditory, spatial or personal inattention or extinction to bilateral simultaneous stimulation in one of the sensory modalities          Total:   8        ------------------------------------------------------------------------------------------------------------------------------------------     Medical Decision Making:   Ischemic stroke     Discussion of Management with Other Providers: multidisciplinary discussion with ED provider as well as [facility] stroke team neurologist    IV Thrombolysis   IV Thrombolysis Given: No; Thrombolysis contraindication reason: Patient was eligible for IV Thrombolysis but it was not given because pt/family refuse IV thrombolysis following discussion of benefits/risks     Plan/Recommendations: See assessment and plan as above      LABS AND IMAGING:       Relevant Results  Labs Reviewed   CBC WITH AUTO DIFFERENTIAL - Abnormal       Result Value    WBC 7.8      nRBC 0.0      RBC 5.94 (*)     Hemoglobin 15.5      Hematocrit 48.3      MCV 81      MCH 26.1      MCHC 32.1      RDW 15.5 (*)     Platelets 229      Neutrophils % 68.4      Immature Granulocytes  %, Automated 0.3      Lymphocytes % 20.5      Monocytes % 9.9      Eosinophils % 0.4      Basophils % 0.5      Neutrophils Absolute 5.30      Immature Granulocytes Absolute, Automated 0.02      Lymphocytes Absolute 1.59      Monocytes Absolute 0.77      Eosinophils Absolute 0.03      Basophils Absolute 0.04     COMPREHENSIVE METABOLIC PANEL - Abnormal    Glucose 174 (*)     Sodium 138      Potassium 3.5      Chloride 101      Bicarbonate 26      Anion Gap 15      Urea Nitrogen 11      Creatinine 0.95      eGFR >90      Calcium 9.8      Albumin 4.7      Alkaline Phosphatase 60      Total Protein 7.6      AST 15      Bilirubin, Total 0.4      ALT 18     MAGNESIUM - Normal    Magnesium 1.99     SERIAL TROPONIN-INITIAL - Normal    Troponin I, High Sensitivity <3      Narrative:     Less than 99th percentile of normal range cutoff-  Female and children under 18 years old <14 ng/L; Male <21 ng/L: Negative  Repeat testing should be performed if clinically indicated.     Female and children under 18 years old 14-50 ng/L; Male 21-50 ng/L:  Consistent with possible cardiac damage and possible increased clinical   risk. Serial measurements may help to assess extent of myocardial damage.     >50 ng/L: Consistent with cardiac damage, increased clinical risk and  myocardial infarction. Serial measurements may help assess extent of   myocardial damage.      NOTE: Children less than 1 year old may have higher baseline troponin   levels and results should be interpreted in conjunction with the overall   clinical context.     NOTE: Troponin I testing is performed using a different   testing methodology at [facility] than at other   [facility]. Direct result comparisons should only   be made within the same method.   SERIAL TROPONIN, 1 HOUR - Normal    Troponin I, High Sensitivity 3      Narrative:     Less than 99th percentile of normal range cutoff-  Female and children under 18 years old <14 ng/L; Male <21 ng/L:  Negative  Repeat testing should be performed if clinically indicated.     Female and children under 18 years old 14-50 ng/L; Male 21-50 ng/L:  Consistent with possible cardiac damage and possible increased clinical   risk. Serial measurements may help to assess extent of myocardial damage.     >50 ng/L: Consistent with cardiac damage, increased clinical risk and  myocardial infarction. Serial measurements may help assess extent of   myocardial damage.      NOTE: Children less than 1 year old may have higher baseline troponin   levels and results should be interpreted in conjunction with the overall   clinical context.     NOTE: Troponin I testing is performed using a different   testing methodology at [facility] than at other   [facility]. Direct result comparisons should only   be made within the same method.   TROPONIN SERIES- (INITIAL, 1 HR)    Narrative:     The following orders were created for panel order Troponin Series, (0, 1 HR).  Procedure                               Abnormality         Status                     ---------                               -----------         ------                     Troponin I, High Sensiti...[[id number]]  Normal              Final result               Troponin, High Sensitivi...[[id number]]  Normal              Final result                 Please view results for these tests on the individual orders.   LIPID PANEL   HEMOGLOBIN A1C   POCT GLUCOSE METER   POCT GLUCOSE METER   POCT GLUCOSE METER   POCT GLUCOSE METER     CT angio brain attack head w IV contrast and post procedure   Final Result   1. No evidence of an acute ischemic infarct, acute intracranial   hemorrhage, or mass effect.        2. 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 No associated   dissection flap, mural thickening, or significant stenosis.        3. The remainder of the CTA head and neck is unremarkable.              MACRO:   Yasmany Smalls discussed the significance and urgency of this   critical finding by [facility] with  FARZANA MIX on 3/18/2024 at   3:19 am with confirmation of receipt .  (**-RCF-**) Findings:  See   findings.        Signed by: Yasmany Smalls 3/18/2024 3:19 AM   Dictation workstation:   [id number]      CT angio brain attack neck w IV contrast and post procedure   Final Result   1. No evidence of an acute ischemic infarct, acute intracranial   hemorrhage, or mass effect.        2. 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 No associated   dissection flap, mural thickening, or significant stenosis.        3. The remainder of the CTA head and neck is unremarkable.             MACRO:   Yasmany Smalls discussed the significance and urgency of this   critical finding by [facility] with  FARZANA MIX on 3/18/2024 at   3:19 am with confirmation of receipt .  (**-RCF-**) Findings:  See   findings.        Signed by: Yasmany Smalls 3/18/2024 3:19 AM   Dictation workstation:   [id number]      CT brain attack head wo IV contrast   Final Result   1. No evidence of an acute ischemic infarct, acute intracranial   hemorrhage, or mass effect.        2. 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 No associated   dissection flap, mural thickening, or significant stenosis.        3. The remainder of the CTA head and neck is unremarkable.             MACRO:   Yasmany Smalls discussed the significance and urgency of this   critical finding by [facility] with  FARZANA MIX on 3/18/2024 at   3:19 am with confirmation of receipt .  (**-RCF-**) Findings:  See   findings.        Signed by: Yasmany Smalls 3/18/2024 3:19 AM   Dictation workstation:   [id number]      XR chest 1 view   Final Result   1.  No radiographic evidence of acute cardiopulmonary process.                   MACRO:   None.        Signed by: Madelaine Walker 3/18/2024 2:51 AM   Dictation workstation:   [id number]      MR brain wo IV contrast    (Results Pending)   Transthoracic Echo (TTE) Complete    (Results Pending)

## 2024-03-18 NOTE — ED PROVIDER NOTES
HPI   Chief Complaint   Patient presents with    Shortness of Breath     Pt complaining of shortness of breath after smoking weed and cocaine.       History provided by: Patient and family    Limitations to history: None    CC: Shortness of breath    HPI: 43-year-old male presents emergency department to be evaluated for a lump in his throat and shortness of breath.  Patient states that he was at home drinking tea and smoking marijuana and crack cocaine.  States that shortly after he felt a lump at the base of his throat that he feels is getting bigger.  Denies noticing any lump or mass there before hand.  Does report feeling anxious.  States that makes him feel short of breath.  He denies history of asthma or COPD the use of breathing treatments.  Denies any tongue, lip, neck swelling.  Denies any change in his voice.  Able to tolerate his own secretions and water.  Denies any allergen exposures or history of anaphylactic reactions.  Denies headache and vision changes.  Denies neck pain and stiffness.  Denies cough, flulike symptoms, pleuritic pain, hemoptysis.  He denies history of ACS, he is never required a stress test and denies family history of heart disease.  Denies history of CHF and PE/DVTs.  Denies all GI and  complaints.  Denies sore throat.  Denies all other systemic symptoms.    ROS: Negative unless mentioned in HPI    Social Hx: Denies alcohol use.  Patient is a history of substance and tobacco abuse.    Medical Hx: Medical history significant for depression, anxiety, diverticulitis, TIA, IBS.  Denies allergies.  Immunizations up-to-date.    Surgical HX: Denies    Physical exam:    Constitutional: Patient is well-nourished and well-developed.  Appears to be anxious but nontoxic in appearance.  Oriented to person, place, time, and situation.    HEENT: Head is normocephalic, atraumatic. Patient's airway is patent.  Tympanic membranes are clear bilaterally.  Nasal mucosa clear.  Mouth with normal  mucosa.  Throat is not erythematous and there are no oropharyngeal exudates, uvula is midline.  No obvious facial deformities.  No muffled heart sounds or JVD no angioedema.  No face, tongue, lip swelling.  No obvious neck mass.  No drooling or tripoding.  No muffled or hoarse voice.  No nuchal rigidity or meningeal signs.    Eyes: Clear bilaterally.  Pupils are equal round and reactive to light and accommodation.  Extraocular movements intact.      Cardiac: Tachycardic, egular rhythm.  Heart sounds S1, S2.  No murmurs, rubs, or gallops.  PMI nondisplaced.  No JVD.    Respiratory: 100% on room air.  Regular respiratory rate and effort.  Breath sounds are clear and equal bilaterally, no adventitious lung sounds.  Patient is speaking in full sentences and is in no apparent respiratory distress. No use of accessory muscles.      Gastrointestinal: Abdomen is soft, nondistended, and nontender.  There are no obvious deformities.  No rebound tenderness or guarding.  Bowel sounds are normal active.    Genitourinary: No CVA or flank tenderness.    Musculoskeletal: No reproducible tenderness.  No obvious skin or bony deformities.  Patient has equal range of motion in all extremities and no strength deficiencies.  No muscle or joint tenderness. No back or neck tenderness.  Capillary refill less than 3 seconds.  Strong peripheral pulses.  No sensory deficits.    Neurological: Patient is alert and oriented.  No focal deficits.  5/5 strength in all extremities.  Cranial nerves II through XII intact. GCS15.     Skin: Skin is normal color for race and is warm, dry, and intact.  No evidence of trauma.  No lesions, rashes, bruising, jaundice, or masses.    Psych: Appropriate mood and affect.  No apparent risk to self or others.    Heme/lymph: No adenopathy, lymphadenopathy, or splenomegaly    Physical exam is otherwise negative unless stated above or in history of present illness.                        [place] Coma Scale Score: 15                      Patient History   Past Medical History:   Diagnosis Date    Anxiety     Asthma     Chronic back pain     Depression     Diverticulitis     Fatty liver     IBS (irritable bowel syndrome)     TIA (transient ischemic attack)      No past surgical history on file.  No family history on file.  Social History     Tobacco Use    Smoking status: Every Day     Types: Cigarettes    Smokeless tobacco: Current   Substance Use Topics    Alcohol use: Not on file    Drug use: Not on file       Physical Exam   ED Triage Vitals [03/18/24 0120]   Temperature Heart Rate Respirations BP   36.6 °C (97.9 °F) (!) 144 20 (!) 146/106      Pulse Ox Temp Source Heart Rate Source Patient Position   100 % Temporal -- Sitting      BP Location FiO2 (%)     Right arm --       Physical Exam    ED Course & MDM   Diagnoses as of 03/18/24 0351   Substance abuse (CMS/HCC)   Stroke-like symptoms   Slurred speech   Facial droop     Patient updated on plan for lab testing, IV insertion, radiology imaging, and medications to be administered while in the ER (if indicated). Patient updated on expected wait times for testing and results. Patient provided my name and told to ask any staff member for questions or concerns if they should arise. Electronic medical record reviewed.     MDM    Upon initial assessment, patient appears to be anxious but nontoxic in appearance.    Patient presented to the emergency department with the chief complaint shortness of breath with a lump/mass sensation in throat.  Head is normocephalic, atraumatic. Patient's airway is patent.  Tympanic membranes are clear bilaterally.  Nasal mucosa clear.  Mouth with normal mucosa.  Throat is not erythematous and there are no oropharyngeal exudates, uvula is midline.  No obvious facial deformities.  No muffled heart sounds or JVD no angioedema.  No face, tongue, lip swelling.  No obvious neck mass.  No drooling or tripoding.  No muffled or hoarse voice.  No nuchal rigidity  or meningeal signs.patient is 100% on room air and in no acute aspiratory distress.  No muffled heart sounds, JVD, murmur.  No stridor or wheezing.  Significant tachycardia but otherwise unremarkable.  On arrival to the emergency department, vital signs were     Patient has no history or physical exam findings that would suggest retropharyngeal or peritonsillar abscess.  No findings that would suggest angioedema or anaphylactic allergic reaction.  No findings that would suggest epiglottitis.  No findings that would suggest upper airway narrowing or indications for racemic epinephrine. No indications for IV or IM Decadron.  No wheezing that would suggest an asthma exacerbation.    No findings that would suggest an esophageal impaction.  Will give the patient p.o. Valium since I do believe a lot of his symptoms are coming from stress from the recent cocaine use.  Will give him IV normal saline as well.  Will get basic blood work, magnesium, and EKG and troponin given his cocaine use and risk for cardiac ischemia.  Low suspicion for PE at this time given that his shortness of breath is coming from this lump/mass sensation in his throat.  He is 100% on room air and denies any pleuritic pain or hemoptysis.  He has no history of blood clots and no specific risk factors for blood clots including recent plane flights or surgeries.  Denies history of cancer.      Patient's EKG was performed at 159 interpreted by me.  Sinus tachycardia 122 beats minute.  No ST elevation or depression.  No prolonged QT.    2:30 AM: We are called into the room by the patient's significant other due to the patient exhibiting strokelike symptoms.  A full neuroassessment was done.  Patient does have patient does have difficulty lifting his left eyebrow.  Also has some difficulty fully opening and closing his left eye.  He does have a small amount of left lower facial droop.  Patient's speech does appear to be mildly slurred.  He has equal ability  to puff his cheeks and he has no tongue deviation.  His extraocular movements are intact and has no visual field or acuity changes.  Patient does have slight decreased strength and knee left upper and left lower extremity compared to the right but still has 5 out of 5 strength.  Patient is able to do the finger-to-nose test and heel-to-shin test.  Patient be taken back for CT angiography of the head and neck.  NIH is 2.  Stroke alert was called.     Patient is a history of TIA back in December.  He did have an MRI and carotid ultrasounds which revealed no acute abnormalities.  Patient does take a baby aspirin he is not sure if he supposed to be taking Plavix or not.    CT and CTA revealed no intracranial hemorrhage or obvious signs of infarction.  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  Patient has not taken his aspirin today so he was given full dose aspirin.  [facility] Dr[facility], who reassured me that the patient would not be be appropriate candidate for TNK or embolectomy at this time.  Also informed that the patient could be admitted here for stroke workup.  Patient's insurance is covered by [employer] however he does not want to be transferred at this time and he feels more comfortable being admitted here.  I did discuss the potential financial implications for this and he and his wife verbalized understanding of this.    Magnesium is 1.99.  Troponin originally is 3, will get a repeat.  CMP shows hyperglycemia 174 without signs of DKA.  CBC send no leukocytosis or anemia.  Patient's vital signs are stable and he is no longer tachycardic.  Chest x-ray reveals no acute cardiopulmonary process including pneumonia, pulm edema, pleural effusion, cardiomegaly.    I spoke to the hospitalist nurse practitioner who is agreeable to admit the patient for stroke workup.  Patient is agreeable with  this plan and here hemodynamically stable in the ER.    5:15 AM: We are made aware by the nursing staff that the patient's left leg weakness is getting worse.  His dysarthria and facial drooping is similar to previous as well. His LUE weakness is similar however his LLE weakness is progressing where he is having a difficult time lifting if off the bed.He is also unable to stand.   My attending and I discussed treatment options with the patient including TNK.  We did discuss this with the stroke attending at [facility] again who did agree that the patient could benefit from TNK.  Discussed any exclusion criteria. After a full discussion on the risks and benefits, patient elected to not receive TNK at this time.  Will continue to admit the patient to the floor for further evaluation.    this note was dictated using a speech recognition program.  While an attempt was made at proof-reading to minimize errors, minor errors in transcription may be present    Medical Decision Making      Procedure  Procedures     Luis Kirkpatrick PA-C  03/18/24 0351       Luis Kirkpatrick PA-C  03/18/24 0603       Luis Kirkpatrick PA-C  03/18/24 1449

## 2024-03-18 NOTE — ED PROVIDER NOTES
0521:    Reassessed pt as medicine NP was at bedside. Pt's weakness and nihss is worsening. Weak in the left leg. Cannot stand. Worsening sensory loss in the LLE. NIHSS earlier was 2 and now it would be 6. However, it would be disabling as pt cannot even stand now. I had him attempt to get out of bed. Previous CT angio was discussed with stroke. No LVO or acute hemorrhage noted on initial images.    Will be admitted to ICU now with plans for MRI as well. Re-paged to discuss with stroke/neuro to ensure there are no alternative measures that need to be taken.     Talked to stroke again about pt's uptrending NIHSS with worsening left leg weakness. She noted she does not believe that extracranial right aneurysm to be a cause of the symptoms.  She recommended TNK for disabling etiology.    Update: pt continued to have hesitancy about this medication. I provided him with literature from [facility] about risk/benefits of TNK, including hemorrhagic conversion. Talked to him about medical management w/o TNK as well in addition to the needs for PT/OT for recovery. Talked to him about possible 1 full year of recovery and any deficits beyond that time period to be life long and potentially disabling in terms of his daily activities.  Patient states that he accepts this risk and does not want TNK.  His significant other/girlfriend was at bedside with him during this decision as well.  Will continue with current plan for admission to medicine.     Chan Campo MD  03/18/24 6081

## 2024-03-18 NOTE — PROGRESS NOTES
Inpatient Speech-Language Pathology Clinical Swallow Evaluation       Patient Name: Jean-Pierre Stubbs  MRN: [id number]  : 1980  Today's Date: 24  Time Calculation  Start Time: 945  Stop Time: 1005  Time Calculation (min): 20 min       Assessment:  Consistencies Trialed: Consistencies Trialed: Thin (IDDSI Level 0) - Straw, Thin (IDDSI Level 0) - Cup, Pureed/extremely thick (IDDSI Level 4), Regular (IDDSI Level 7)   Oral Motor: Impaired Function  Lingual Strength: Within Functional Limits   Dentition: Adequate/Natural   Assessment Results:   DYSPHAGIA EVALUATION: (Consistencies attempted: Puree, gram cracker trial, thin liquids via cup and straws)    Oral Status: Patient had mild left-sided facial weakness when asked to pucker and smile.  He had adequate lingual strength and range of motion.  Patient had within functional limits dentition.    Oral Phase: Patient's oral phase of the swallow characterized by functional limits ability to tolerate purée consistency food.  He had minimal oral delay with yoni cracker with minimal oral residuals.  Patient tolerated thin liquids via cup and straw without anterior leakage of liquid out of oral cavity.    Pharyngeal Phase: Patient's pharyngeal phase of the swallow characterized by no overt signs symptoms aspiration with food or liquid trials during the bedside swallow evaluation.      DIAGNOSTIC IMPRESSIONS:  Patient presents with a swallow that was within functional limits.  Recommending diet upgrade to regular with thin liquids.  Speech therapy for dysphagia not indicated at this time.      Prognosis: Good         Plan:  SLP Plan: No skilled SLP Skilled speech therapy for dysphagia treatment is warranted in order to provide training and instruction regarding the use of compensatory swallow strategies, oropharyngeal strengthening exercises, and pt/caregiver education in order to reduce risk of aspiration, dehydration and malnutrition.              Discussed POC:  Patient, Nursing, Caregiver/family   Discussed Risks/Benefits: Yes, Patient, Caregiver/Family   Patient/Caregiver Agreeable: Yes   SLP Discharge Recommendations: unable to determine at this time, refer to subsequent notes    Subjective   Patient was seen at bedside this a.m.  He was resting comfortably and awakened easily.  Patient was repositioned in an upright position in his bed.  His significant other came in the room half-way through the bedside swallow evaluation.  Patient had dysarthric speech.  See speech-language eval for details regarding dysarthria of speech.    General Visit Information:  Pt. Admitted  Patient was admitted with difficulty swallowing, lump in his throat and shortness of breath.  He had a witnessed strokelike symptoms with left-sided weakness and left-sided facial droop.  Patient refused TNK intervention.  Past medical history: He has past history of TIA in December 2023, anxiety, asthma, chronic back pain, depression, and diverticulitis.  Patient also has history of drug use and was using marijuana and crack cocaine prior to admission.    Living Environment: Home           Reason for Referral: Stroke like symptoms      Current Diet : N.p.o.   Prior to Session Communication: Bedside nurse     Pain:  Pain Assessment  Pain Assessment: 0-10  Pain Score: 0 - No pain       Baseline Assessment:  Respiratory Status: Room air   Patient Positioning: Upright in Bed       Inpatient Education:  Pt. educated on safe swallow strategies, role of SLP, S/S of aspiration to be aware of, risks of aspiration, current swallow function, recommended diet  Patient gave verbal understanding

## 2024-03-18 NOTE — CARE PLAN
Problem: Fall/Injury  Goal: Not fall by end of shift  3/18/2024 1100 by Barbara Dickson RN  Outcome: Progressing  3/18/2024 1059 by Barbara Dickson RN  Outcome: Progressing  Goal: Be free from injury by end of the shift  3/18/2024 1100 by Barbara Dickson RN  Outcome: Progressing  3/18/2024 1059 by Barbara Dickson RN  Outcome: Progressing  Goal: Verbalize understanding of personal risk factors for fall in the hospital  3/18/2024 1100 by Barbara Dickson RN  Outcome: Progressing  3/18/2024 1059 by Barbara Dickson RN  Outcome: Progressing  Goal: Verbalize understanding of risk factor reduction measures to prevent injury from fall in the home  3/18/2024 1100 by Barbara Dickson RN  Outcome: Progressing  3/18/2024 1059 by Barbara Dickson RN  Outcome: Progressing  Goal: Use assistive devices by end of the shift  3/18/2024 1100 by Barbara Dickson RN  Outcome: Progressing  3/18/2024 1059 by Barbara Dickson RN  Outcome: Progressing  Goal: Pace activities to prevent fatigue by end of the shift  3/18/2024 1100 by Barbara Dickson RN  Outcome: Progressing  3/18/2024 1059 by Barbara Dickson RN  Outcome: Progressing

## 2024-03-19 ENCOUNTER — APPOINTMENT (OUTPATIENT)
Dept: RADIOLOGY | Facility: HOSPITAL | Age: 44
DRG: 065 | End: 2024-03-19
Payer: COMMERCIAL

## 2024-03-19 LAB
ATRIAL RATE: 182 BPM
GLUCOSE BLD MANUAL STRIP-MCNC: 127 MG/DL (ref 74–99)
GLUCOSE BLD MANUAL STRIP-MCNC: 133 MG/DL (ref 74–99)
GLUCOSE BLD MANUAL STRIP-MCNC: 90 MG/DL (ref 74–99)
GLUCOSE BLD MANUAL STRIP-MCNC: 99 MG/DL (ref 74–99)
Q ONSET: 223 MS
QRS COUNT: 20 BEATS
QRS DURATION: 70 MS
QT INTERVAL: 424 MS
QTC CALCULATION(BAZETT): 604 MS
QTC FREDERICIA: 537 MS
R AXIS: 88 DEGREES
T AXIS: 38 DEGREES
T OFFSET: 435 MS
TSH SERPL-ACNC: 1.82 MIU/L (ref 0.44–3.98)
VENTRICULAR RATE: 122 BPM

## 2024-03-19 PROCEDURE — 99232 SBSQ HOSP IP/OBS MODERATE 35: CPT | Performed by: INTERNAL MEDICINE

## 2024-03-19 PROCEDURE — 2500000004 HC RX 250 GENERAL PHARMACY W/ HCPCS (ALT 636 FOR OP/ED): Performed by: STUDENT IN AN ORGANIZED HEALTH CARE EDUCATION/TRAINING PROGRAM

## 2024-03-19 PROCEDURE — 97530 THERAPEUTIC ACTIVITIES: CPT | Mod: GP,CQ

## 2024-03-19 PROCEDURE — 82947 ASSAY GLUCOSE BLOOD QUANT: CPT

## 2024-03-19 PROCEDURE — 92523 SPEECH SOUND LANG COMPREHEN: CPT | Mod: GN | Performed by: SPEECH-LANGUAGE PATHOLOGIST

## 2024-03-19 PROCEDURE — 96372 THER/PROPH/DIAG INJ SC/IM: CPT | Performed by: STUDENT IN AN ORGANIZED HEALTH CARE EDUCATION/TRAINING PROGRAM

## 2024-03-19 PROCEDURE — G0378 HOSPITAL OBSERVATION PER HR: HCPCS

## 2024-03-19 PROCEDURE — 2500000001 HC RX 250 WO HCPCS SELF ADMINISTERED DRUGS (ALT 637 FOR MEDICARE OP): Performed by: INTERNAL MEDICINE

## 2024-03-19 PROCEDURE — 93970 EXTREMITY STUDY: CPT

## 2024-03-19 PROCEDURE — 99233 SBSQ HOSP IP/OBS HIGH 50: CPT | Performed by: SPECIALIST

## 2024-03-19 PROCEDURE — 97535 SELF CARE MNGMENT TRAINING: CPT | Mod: GO,CO

## 2024-03-19 PROCEDURE — 93971 EXTREMITY STUDY: CPT | Performed by: RADIOLOGY

## 2024-03-19 PROCEDURE — 94760 N-INVAS EAR/PLS OXIMETRY 1: CPT

## 2024-03-19 PROCEDURE — 2500000001 HC RX 250 WO HCPCS SELF ADMINISTERED DRUGS (ALT 637 FOR MEDICARE OP): Performed by: NURSE PRACTITIONER

## 2024-03-19 RX ORDER — AMLODIPINE BESYLATE 5 MG/1
2.5 TABLET ORAL DAILY
Status: DISCONTINUED | OUTPATIENT
Start: 2024-03-19 | End: 2024-03-21 | Stop reason: HOSPADM

## 2024-03-19 RX ADMIN — ATORVASTATIN CALCIUM 80 MG: 80 TABLET, FILM COATED ORAL at 21:09

## 2024-03-19 RX ADMIN — ENOXAPARIN SODIUM 40 MG: 40 INJECTION SUBCUTANEOUS at 09:04

## 2024-03-19 RX ADMIN — ASPIRIN 81 MG: 81 TABLET, COATED ORAL at 09:04

## 2024-03-19 RX ADMIN — AMLODIPINE BESYLATE 2.5 MG: 5 TABLET ORAL at 11:24

## 2024-03-19 ASSESSMENT — COGNITIVE AND FUNCTIONAL STATUS - GENERAL
DRESSING REGULAR LOWER BODY CLOTHING: A LOT
DRESSING REGULAR UPPER BODY CLOTHING: A LITTLE
CLIMB 3 TO 5 STEPS WITH RAILING: A LOT
MOVING TO AND FROM BED TO CHAIR: A LITTLE
DAILY ACTIVITIY SCORE: 19
MOBILITY SCORE: 16
MOVING FROM LYING ON BACK TO SITTING ON SIDE OF FLAT BED WITH BEDRAILS: A LITTLE
TURNING FROM BACK TO SIDE WHILE IN FLAT BAD: A LOT
HELP NEEDED FOR BATHING: A LITTLE
MOBILITY SCORE: 23
PERSONAL GROOMING: A LITTLE
TOILETING: A LOT
MOBILITY SCORE: 15
CLIMB 3 TO 5 STEPS WITH RAILING: A LITTLE
PERSONAL GROOMING: A LITTLE
STANDING UP FROM CHAIR USING ARMS: A LOT
HELP NEEDED FOR BATHING: A LOT
TOILETING: A LITTLE
DRESSING REGULAR UPPER BODY CLOTHING: A LITTLE
MOVING TO AND FROM BED TO CHAIR: A LOT
WALKING IN HOSPITAL ROOM: A LOT
CLIMB 3 TO 5 STEPS WITH RAILING: A LOT
WALKING IN HOSPITAL ROOM: A LOT
DAILY ACTIVITIY SCORE: 24
DAILY ACTIVITIY SCORE: 16
STANDING UP FROM CHAIR USING ARMS: A LITTLE
DRESSING REGULAR LOWER BODY CLOTHING: A LITTLE

## 2024-03-19 ASSESSMENT — PAIN SCALES - GENERAL
PAINLEVEL_OUTOF10: 0 - NO PAIN

## 2024-03-19 ASSESSMENT — PAIN - FUNCTIONAL ASSESSMENT
PAIN_FUNCTIONAL_ASSESSMENT: 0-10

## 2024-03-19 ASSESSMENT — ACTIVITIES OF DAILY LIVING (ADL): HOME_MANAGEMENT_TIME_ENTRY: 10

## 2024-03-19 NOTE — PROGRESS NOTES
Per pt/ot they are rec. High intesity therapy [facility] score 12/15.  Spoke with pt. And girlfriend at bedside.  Acute rehab discussed, they would like [facility].  Pt. With [employer] ins[employer] will require ins. Precert.  Ins. Precert process explained and snf briefly discussed if acute rehab denied.  Referral made to lis [facility] acute [facility].  And once accepted to proceed with ins. Precert.    Update:  per [facility] acute re[facility] they are unable to accept pt.  Spoke with pt. To inform and discuss option of snf, facilities that will accept [facility].  He is agreeable to [facility].  Referral sent to [facility].

## 2024-03-19 NOTE — PROGRESS NOTES
"Speech-Language Pathology    SLP Adult Inpatient Speech-Language Evaluation    Patient Name: Jean-Pierre Stubbs  MRN: [id number]  Today's Date: 3/19/2024   Time Calculation  Start Time: 1015  Stop Time: 1035  Time Calculation (min): 20 min       Current Problem:   1. Stroke-like symptoms  Transthoracic Echo (TTE) Complete    Transthoracic Echo (TTE) Complete      2. Substance abuse (CMS/HCC)        3. Slurred speech  Transthoracic Echo (TTE) Complete    Transthoracic Echo (TTE) Complete      4. Facial droop  Transthoracic Echo (TTE) Complete    Transthoracic Echo (TTE) Complete      5. TIA (transient ischemic attack)  Transthoracic Echo (TTE) Complete    Transthoracic Echo (TTE) Complete        SLP Assessment:  SLP Assessment  SLP Assessment Results: Expression deficits  Treatment Provided: No  Medical Staff Made Aware: Yes  Strengths: Family/Caregiver Suppport  Barriers: Cognition, Comorbidities, Motivation  Education Provided: Yes    Pt and significant other reporting speech sounding garbled since admission and wondering if this could be delayed symptoms from TIA which occurred \"a couple months ago\". Pt has with significant stuttering and dysfluent speech. Pt with secondary characteristics (e.g. left hand tension and tremor when speaking) that increased with severity has rate of speech or volume increased. Pt reporting he \"does not care\" how he sounds and \"is not worried about it\". Pt educated on critical windows and how therapy can help improve his overall expressive language skills. Significant amount of time was spent recommending rehab services post discharge to which pt eventually agreed to at end of session. Shared this information with RN and care coordinator. Speech therapy to continue to follow for expressive speech impairment.     SLP Plan:  Plan  SLP Plan: Skilled SLP  SLP Frequency: 3x per week  SLP Discharge Recommendations: Continue skilled SLP services at the next level of care  Next Treatment Priority: " fluency tasks  Discussed POC: Patient, Caregiver/family, Nursing  Discussed Risks/Benefits: Yes, Patient, Caregiver/Family  Patient/Caregiver Agreeable: Yes  SLP - OK to Discharge: No    Subjective   Pt upright in bed willing to participate in therapy.     General Visit Information:  General Information  Reason for Referral: Stroke like symptoms  Referred By: Armida  Past Medical History Relevant to Rehab: asthma, TIA, depression, anxiety, OA, IBS, + smoker  Prior to Session Communication: Bedside nurse    Objective   EST 3/19/24:  Juan Carlos will identify physical tension/secondary behavior in 80% of stuttering moments across three sessions.  Juan Carlos will reduce physical tension (e.g.,cancellation) in 80% of stuttering moments across three sessions.  Juan Carlos will reduce physical tension during a stuttering moment in five different speaking environments    Pain:  Pain Assessment  Pain Assessment: 0-10  Pain Score: 0 - No pain    Cognition:  Cognition  Overall Cognitive Status: Within Functional Limits  Orientation Level: Oriented X4  Processing Speed: Delayed    Auditory Comprehension:   Auditory Comprehension  Yes/No Questions: Within Functional Limits  Commands: Within Functional Limits  Conversation: Impaired  Conversation Comments: Dysfluent    Verbal:  Verbal Expression  Primary Mode of Expression: Verbal  Confrontation Naming: Within Functional Limits  Responsive Naming: Within Functional Limits  Repetition: Impaired  Sentence Completion: Impaired  Open Ended Questions: Impaired  Conversation: Impaired  [place]: Impaired

## 2024-03-19 NOTE — PROGRESS NOTES
Physical Therapy    Physical Therapy Treatment    Patient Name: Jean-Pierre Stubbs  MRN: [id number]  Today's Date: 3/19/2024  Time Calculation  Start Time: 0942  Stop Time: 1005  Time Calculation (min): 23 min       Assessment/Plan   PT Assessment  PT Assessment Results: Decreased strength, Impaired balance, Decreased mobility  Rehab Prognosis: Good  Treatment Tolerance: Patient tolerated treatment well, Patient limited by fatigue  Medical Staff Made Aware: Yes  End of Session Communication: Bedside nurse, PCT/GRAEME/KATLIN  Assessment Comment: Patient continues to require (A) for safety with transfers/amb. Patient will benefit from additional PT to address deficits and improve mobility.  End of Session Patient Position: Bed, 2 rail up (No alarm. Call light, phone, and tray table within reach.)  PT Plan  Inpatient/Swing Bed or Outpatient: Inpatient  Treatment/Interventions: Transfer training, Gait training, Balance training, Stair training, Strengthening, Therapeutic activity  PT Plan: Skilled PT  PT Frequency: 4 times per week  PT Discharge Recommendations:  (High intensity and high frequency with 24/7 care)  Equipment Recommended upon Discharge:  (TBD)   PT Recommended Transfer Status: Assist x2    General Visit Information:   PT  Visit  PT Received On: 03/19/24  General  Family/Caregiver Present: Yes (Girlfriend present and supportive.)  Co-Treatment: OT  Co-Treatment Reason: Co-treat with OT to maximize patient and staff safety with transfers/amb.  Prior to Session Communication: Bedside nurse, PCT/GRAEME/KATLIN  Patient Position Received: Bed, 3 rail up (No alarm.)  General Comment: Initially c/o it being too early to get up, but eventually agreed to participate in therapy when encouragement was provided. Intermittent stuttering with speech.    General Observations:   General Observation: Patient using UEs equally with activities, but continues to limit/avoid WB through (L)LE.    Subjective     Precautions:  Precautions  Medical  Precautions: Fall precautions    Objective     Pain:  Pain Assessment  Pain Assessment: 0-10  Pain Score: 0 - No pain (Just c/o tingling(pins and needles) sensation (L)LE.)    Cognition:  Cognition  Overall Cognitive Status: Within Functional Limits    Balance:   Static Sitting: G  Dynamic Sitting: F+  Static Standing: F with ww  Dynamic Standing: F- with ww    Treatments:  Therapeutic Exercise  Therapeutic Exercise Performed: Yes  Therapeutic Exercise Activity 2: Instructed patient in seated ther ex. AROM BLE with AP/LAQ x2-3. Patient's (L)LE is weaker than (R)LE.     Balance/Neuromuscular Re-Education  Balance/Neuromuscular Re-Education Activity Performed: Yes  Balance/Neuromuscular Re-Education Activity 1: Multi-level dynamic reaching in standing position(L/R/C/Shoulder height/Across midline) with x1UE support. Limited WB through (L)LE.  Bed Mobility  Bed Mobility: Yes  Bed Mobility 1  Bed Mobility 1: Supine to sitting, Sitting to supine  Level of Assistance 1:  (SBA)  Bed Mobility Comments 1: HOB flat. Good use of bed rail for support. Denies dizziness with positional changes.  Ambulation/Gait Training  Ambulation/Gait Training Performed: Yes  Ambulation/Gait Training 1  Surface 1: Level tile  Device 1: Rolling walker  Gait Support Devices: Gait belt  Assistance 1: Minimum assistance (x2)  Comments/Distance (ft) 1: ~10ft x2. NBOS. Slow jan. Forward flexed posture with increased WB through UEs on ww. Step to<-->through gait pattern. Decreased step height/length B. Varies WB through (L)LE; tends to WB through lateral surface of (L)foot and will occasionally go with NWB(L)LE with amb. No buckling of (L)knee with amb or WB activtities. v/c and (A) for safer maneuvering of ww with 90/180° turns. Tolerated ~5min standing with x1-2UE support before returning to bed. No seated rest breaks with amb/standing tasks. Chair follow with amb for safety.  Transfers  Transfer: Yes  Transfer 1  Technique 1: Sit to stand,  Stand to sit  Transfer Device 1: Gait belt (ww)  Transfer Level of Assistance 1: Minimum assistance, +2  Trials/Comments 1: (2x). v/c for safe hand placement and technique. Slow transition of hands to/from ww. Benefits from elevated surfaces.          Outcome Measures:  [facility] Basic Mobility  Turning from your back to your side while in a flat bed without using bedrails: A little  Moving from lying on your back to sitting on the side of a flat bed without using bedrails: A lot  Moving to and from bed to chair (including a wheelchair): A little  Standing up from a chair using your arms (e.g. wheelchair or bedside chair): A little  To walk in hospital room: A lot  Climbing 3-5 steps with railing: A lot  Basic Mobility - Total Score: 15    Education Documentation  Mobility Training, taught by Apryl Donahue PTA at 3/19/2024 12:00 PM.  Learner: Significant Other, Patient  Readiness: Acceptance  Method: Explanation, Demonstration  Response: Needs Reinforcement  Comment: See therapy note.    EDUCATION:  Individual(s) Educated: Patient, Significant Other  Education Provided: Body Mechanics, Fall Risk, Home Safety, POC, Posture (Safety with transfers/amb using ww properly for support.)  Patient Response to Education: Patient/Caregiver Verbalized Understanding of Information    Encounter Problems       Encounter Problems (Active)       PT Problem       Bed mobility independent  (Progressing)       Start:  03/18/24    Expected End:  04/15/24            Transfers sit <> stand with SPC CGA (Progressing)       Start:  03/18/24    Expected End:  04/15/24            Patient to ambulate with SPC 30' + 1 minimal assist (Progressing)       Start:  03/18/24    Expected End:  04/15/24            Patient to negotiate 10 steps with single handrail and cane + 1 minimal assist (Not Progressing)       Start:  03/18/24    Expected End:  04/15/24

## 2024-03-19 NOTE — PROGRESS NOTES
Occupational Therapy    OT Treatment    Patient Name: Jean-Pierre Stubbs  MRN: [id number]  Today's Date: 3/19/2024  Time Calculation  Start Time: 0941  Stop Time: 1005  Time Calculation (min): 24 min         Assessment:  End of Session Communication: Bedside nurse  End of Session Patient Position: Bed, 2 rail up, Alarm off, not on at start of session         Subjective   Previous Visit Info:  OT Last Visit  OT Received On: 03/19/24  General:  General  Family/Caregiver Present: Yes (pt girlfriend present throughout)  Co-Treatment Reason: cotreat with PT to increase pt safety and indep with ADLs.  Prior to Session Communication: Bedside nurse  Patient Position Received: Bed, 3 rail up, Alarm off, not on at start of session  General Comment: pt agreeable to OT tx following encouragement  Precautions:  Medical Precautions: Fall precautions       Pain:  Pain Assessment  Pain Assessment: 0-10  Pain Score: 0 - No pain    Objective         Activities of Daily Living: Grooming  Grooming Comments: pt standing at sink with FWW engaging in grooming tasks. pt using L hand to brush his teeth. pt able to maintain grasp of tooth brush while performing. pt required min A for maintaining upright balance while performing  Functional Standing Tolerance:  Functional Standing Tolerance Comments: pt demos F - supported standing balance while at sink engaging in grooming tasks  Bed Mobility/Transfers: Transfers  Transfer:  (pt required Erin x 2 for functional transfers with use of FWW. pt unable to to fully WB on L LE)      Outcome Measures:[facility] Daily Activity  Putting on and taking off regular lower body clothing: A lot  Bathing (including washing, rinsing, drying): A lot  Putting on and taking off regular upper body clothing: A little  Toileting, which includes using toilet, bedpan or urinal: A lot  Taking care of personal grooming such as brushing teeth: A little  Eating Meals: None  Daily Activity - Total Score: 16        Education  Documentation  ADL Training, taught by LENA Andrew at 3/19/2024  1:41 PM.  Learner: Patient  Readiness: Acceptance  Method: Explanation  Response: Needs Reinforcement  Comment: pt educated on OT POC and neuro vinicio activities,    Education Comments  No comments found.        OP EDUCATION:       Goals:  Encounter Problems       Encounter Problems (Active)       ADLs       Pt will dress UB with modified indep (Progressing)       Start:  03/18/24    Expected End:  04/01/24            Pt will complete grooming tasks with SBA after set up (Progressing)       Start:  03/18/24    Expected End:  04/01/24               EXERCISE/STRENGTHENING       Pt will participate in neuro re-ed to faciliate normalized tone and functional use of L UE (Progressing)       Start:  03/18/24    Expected End:  04/01/24               TRANSFERS       Pt will transfer to bed, chair, toielt with Erin x 1 (Progressing)       Start:  03/18/24    Expected End:  04/01/24

## 2024-03-19 NOTE — PROGRESS NOTES
ASSESSMENT & PLAN:     L sided weakness  L facial droop  Dysarthria  -p/w acute onset of above symptoms c/f TIA vs stroke  -CT brain noncon neg for acute findings. Declined TNK in ED.   -CTA H+N neg for LVO  -MRI brain was unremarkable, did not show any focus of acute stroke  -TTE showed normal LVEF, but did show small PFO  -A1C, lipid panel unremarkable  -still has neuro deficits c/w stroke syndrome, was in s/o HTN and cocaine use  Plan:  -neuro following  -further mgmt of PFO per neuro  -cont ASA 81, high intensity statin  -lovenox for vte ppx  -refusing tele  -pt/ot rec high intensity, tcc looking into acute rehab    HTN  -start low dose amlodipine    Cocaine abuse  MJ use  -cessation education    Dispo: acute rehab    Vinicio Huffman MD    SUBJECTIVE     GRAEMEEON. Still has L facial droop, L sided weakness, dysarthric speech.     OBJECTIVE:       Last Recorded Vitals:  Vitals:    03/18/24 2000 03/19/24 0000 03/19/24 0400 03/19/24 0800   BP: 154/78 114/62 (!) 131/91    BP Location: Left arm Left arm Left arm    Patient Position: Lying Lying Lying    Pulse: 60 58 67    Resp: 16 16 16    Temp: 36.7 °C (98.1 °F) 37 °C (98.6 °F) 37 °C (98.6 °F)    TempSrc: Temporal Temporal Temporal    SpO2: 95% 96% 99% 98%   Weight:       Height:           Last I/O:  No intake/output data recorded.    Physical Exam:  GEN: appears stated age, NAD  CV: RRR, no m/r/g, no LE edema  LUNGS: CTAB, no w/r/c  ABD: soft, NT, ND, NBS  SKIN: no rashes  MSK; no gross deformities, normal joints  NEURO: alert, oriented x3, L facial droop, dysarthric, LUE 4/5, LLE 3/5 strength  PSYCH: appropriate mood, affect    Inpatient Medications:  amLODIPine, 2.5 mg, oral, Daily  aspirin, 81 mg, oral, Daily  atorvastatin, 80 mg, oral, Nightly  enoxaparin, 40 mg, subcutaneous, Daily  polyethylene glycol, 17 g, oral, Daily        PRN Medications  PRN medications: acetaminophen, ipratropium-albuteroL, oxygen    Continuous Medications:         LABS AND IMAGING:      Labs:  Results for orders placed or performed during the hospital encounter of 03/18/24 (from the past 24 hour(s))   POCT GLUCOSE   Result Value Ref Range    POCT Glucose 103 (H) 74 - 99 mg/dL   Transthoracic Echo (TTE) Complete   Result Value Ref Range    AV mn grad 4.0 mmHg    AV pk shelly 1.40 m/s    LV biplane EF 56 %    LVOT diam 2.10 cm    MV E/A ratio 1.45     Tricuspid annular plane systolic excursion 1.9 cm    MV avg E/e' ratio 8.51     LA vol index A/L 20.3 ml/m2    RV free wall pk S' 13.90 cm/s    LVIDd 4.48 cm    Aortic Valve Area by Continuity of Peak Velocity 2.77 cm2    AV pk grad 7.8 mmHg    Aortic Valve Area by Continuity of VTI 2.61 cm2    LV A4C EF 56.8    POCT GLUCOSE   Result Value Ref Range    POCT Glucose 122 (H) 74 - 99 mg/dL   POCT GLUCOSE   Result Value Ref Range    POCT Glucose 120 (H) 74 - 99 mg/dL   POCT GLUCOSE   Result Value Ref Range    POCT Glucose 90 74 - 99 mg/dL        Imaging:  MR brain wo IV contrast  Narrative: Interpreted By:  Delfin Warren,   STUDY:  MR BRAIN WO IV CONTRAST;  3/18/2024 9:20 pm      INDICATION:  Signs/Symptoms:Stroke like symptoms.      COMPARISON:  Same day noncontrast CT head; MRI of the brain dated 12/06/2023.      ACCESSION NUMBER(S):  [id number]      ORDERING CLINICIAN:  AMEENA VELASCO      TECHNIQUE:  Axial T2, FLAIR, DWI, gradient echo T2 and sagittal and coronal T1  weighted images of brain were acquired.      FINDINGS:  Exam is degraded by some motion.      Within limitations of the exam no diffusion restriction abnormality  is identified to suggest an acute infarct.      There is no definite evidence of intracranial hemorrhage. No mass  effect or midline shift is present. No pathologic hemosiderin  staining is appreciated.      No ventricular dilatation is evident. Basal cisterns are patent. No  extra-axial fluid collections are identified.      No discrete parenchymal signal abnormality is identified. Visualized  large arterial flow voids,  including intracranial carotid and basilar  artery appear preserved.      No abnormal fluid signal is present in the paranasal sinuses or  mastoid air cells bilaterally.      Impression: Within limitations of some motion, unremarkable MRI of the brain. No  diffusion restriction abnormality is present to suggest an acute  infarct      MACRO:  None      Signed by: Delfin Warren 3/18/2024 9:32 PM  Dictation workstation:   [id number]  CT head wo IV contrast  Narrative: Interpreted By:  Madelaine Walker,   STUDY:  CT HEAD WO IV CONTRAST;  3/18/2024 7:13 pm      INDICATION:  Signs/Symptoms:L sided weakness, L facial droop, dysarthria,  concerning for acute stroke, had worsening speech, L sided weakness,  vomiting.      COMPARISON:  CT head earlier same date 03/18/2024.      ACCESSION NUMBER(S):  [id number]      ORDERING CLINICIAN:  AMEENA VELASCO      TECHNIQUE:  Noncontrast CT axial images were obtained through the brain.  Coronal  and sagittal reformats were performed.      FINDINGS:  The ventricles, sulci and basal cisterns are within normal limits.  The grey-white matter differentiation is intact. No acute territorial  infarct.  There is no mass effect or midline shift. There is no  extraaxial fluid collection.  There is no intracranial hemorrhage.  No depressed calvarial fracture. No air-fluid levels at the  visualized paranasal sinuses. The mastoid air cells are clear.      Impression: 1.  No acute intracranial hemorrhage or acute territorial infarct. If  there is persistent clinical concern for acute ischemia, MRI can be  obtained for further evaluation.                  MACRO:  None.      Signed by: Madelaine Walker 3/18/2024 8:09 PM  Dictation workstation:   [id number]  Transthoracic Echo (TTE) [place] [id number]   Tel [phone] Fax [phone]    TRANSTHORACIC ECHOCARDIOGRAM REPORT       Patient Name:      TICO TOBIN Herrera  Physician:    [id number] Dustin Ramesh DO  Study Date:        3/18/2024            Ordering Provider:    [id number] DOROTHY OLIVIA  MRN/PID:           [id number]             Fellow:  Accession#:        [id number]         Nurse:                Philip Quiñonez RN  Date of Birth/Age: 1980 / 43 years Sonographer:          Shyann Kang RDCS  Gender:            M                    Additional Staff:  Height:            175.26 cm            Admit Date:           3/18/2024  Weight:            99.79 kg             Admission Status:     Inpatient -                                                                Routine  BSA / BMI:         2.15 m2 / 32.49      Department [facility]  [facility]                                      Echo Lab  Blood Pressure: 149 /84 mmHg    Study Type:    TRANSTHORACIC ECHO (TTE) COMPLETE  Diagnosis/ICD: Transient cerebral ischemic attack, unspecified (NOT on                 LCD)-G45.9  Indication:    Transischemic Attack  CPT Codes:     Echo Complete w Full Doppler-[id number]    Patient History:  Pertinent History: Dyspnea, TIA and Polysubstance abuse.    Study Detail: The following Echo studies were performed: 2D, M-Mode, Doppler and                color flow. Agitated saline used as a contrast agent for                intraseptal flow evaluation. The patient was awake.       PHYSICIAN INTERPRETATION:  Left Ventricle: Left ventricular systolic function is normal, with an estimated ejection fraction of 60%. There are no regional wall motion abnormalities. The left ventricular cavity size is normal. Spectral Doppler shows a normal pattern of left ventricular diastolic filling.  LV Wall Scoring:  All segments are normal.    Left Atrium: The left atrium is mildly dilated. A bubble study using  agitated saline was performed. Bubble study is positive. A small PFO (= 10 bubbles) was demonstrated. A bubble study shows that an intrapulmonary shunting is not present.  Right Ventricle: The right ventricle is normal in size. There is normal right ventricular global systolic function.  Right Atrium: The right atrium is normal in size.  Aortic Valve: The aortic valve appears structurally normal. The aortic valve appears tricuspid. There is no evidence of aortic valve stenosis.  There is no evidence of aortic valve regurgitation. The peak instantaneous gradient of the aortic valve is 7.8 mmHg. The mean gradient of the aortic valve is 4.0 mmHg.  Mitral Valve: The mitral valve is normal in structure. There is no evidence of mitral valve stenosis. There is normal mitral valve leaflet mobility. There is no evidence of mitral valve regurgitation.  Tricuspid Valve: The tricuspid valve is structurally normal. There is normal tricuspid valve leaflet mobility. There is trace tricuspid regurgitation.  Pulmonic Valve: The pulmonic valve is structurally normal. There is no indication of pulmonic valve regurgitation.  Pericardium: There is no pericardial effusion noted.  Aorta: The aortic root is normal.  Pulmonary Artery: The main pulmonary artery is normal in size, and position, with normal bifurcation into the left and right pulmonary arteries.  Systemic Veins: The inferior vena cava appears to be of normal size.  In comparison to the previous echocardiogram(s): Prior examinations are available and were reviewed for comparison purposes. The left ventricular function is unchanged. The left ventricular diastolic function is normal.       CONCLUSIONS:   1. Left ventricular systolic function is normal with a 60% estimated ejection fraction.   2. There is no evidence of mitral valve stenosis.   3. No evidence of mitral valve regurgitation.   4. Trace tricuspid regurgitation is visualized.   5. Aortic valve stenosis is not  present.   6. The main pulmonary artery is normal in size, and position, with normal bifurcation into the left and right pulmonary arteries.   7. A bubble study using agitated saline was performed. Bubble study is positive. A small PFO (= 10 bubbles) was demonstrated.    QUANTITATIVE DATA SUMMARY:  2D MEASUREMENTS:                           Normal Ranges:  Ao Root d:     3.10 cm   (2.0-3.7cm)  LAs:           4.00 cm   (2.7-4.0cm)  IVSd:          1.03 cm   (0.6-1.1cm)  LVPWd:         0.98 cm   (0.6-1.1cm)  LVIDd:         4.48 cm   (3.9-5.9cm)  LVIDs:         2.98 cm  LV Mass Index: 71.3 g/m2  LV % FS        33.5 %    LA VOLUME:                                Normal Ranges:  LA Vol A4C:        45.8 ml    (22+/-6mL/m2)  LA Vol A2C:        40.9 ml  LA Vol BP:         43.6 ml  LA Vol Index A4C:  21.3ml/m2  LA Vol Index A2C:  19.0 ml/m2  LA Vol Index BP:   20.3 ml/m2  LA Area A4C:       17.3 cm2  LA Area A2C:       16.2 cm2  LA Major Axis A4C: 5.6 cm  LA Major Axis A2C: 5.5 cm  LA Volume Index:   19.2 ml/m2    RA VOLUME BY A/L METHOD:                                Normal Ranges:  RA Vol A4C:        34.1 ml    (8.3-19.5ml)  RA Vol Index A4C:  15.8 ml/m2  RA Area A4C:       15.1 cm2  RA Major Axis A4C: 5.7 cm    AORTA MEASUREMENTS:                     Normal Ranges:  Asc Ao, d: 3.00 cm (2.1-3.4cm)    LV SYSTOLIC FUNCTION BY 2D PLANIMETRY (MOD):                      Normal Ranges:  EF-A4C View: 56.8 % (>=55%)  EF-A2C View: 60.2 %  EF-Biplane:  55.9 %    LV DIASTOLIC FUNCTION:                         Normal Ranges:  MV Peak E:    0.93 m/s (0.7-1.2 m/s)  MV Peak A:    0.64 m/s (0.42-0.7 m/s)  E/A Ratio:    1.45     (1.0-2.2)  MV e'         0.11 m/s (>8.0)  MV lateral e' 0.11 m/s  MV medial e'  0.11 m/s  E/e' Ratio:   8.51     (<8.0)    MITRAL VALVE:                  Normal Ranges:  MV DT: 181 msec (150-240msec)    AORTIC VALVE:                                    Normal Ranges:  AoV Vmax:                1.40 m/s  (<=1.7m/s)  AoV Peak P.8 mmHg (<20mmHg)  AoV Mean P.0 mmHg (1.7-11.5mmHg)  LVOT Max Patricio:            1.12 m/s (<=1.1m/s)  AoV VTI:                 30.70 cm (18-25cm)  LVOT VTI:                23.10 cm  LVOT Diameter:           2.10 cm  (1.8-2.4cm)  AoV Area, VTI:           2.61 cm2 (2.5-5.5cm2)  AoV Area,Vmax:           2.77 cm2 (2.5-4.5cm2)  AoV Dimensionless Index: 0.75       RIGHT VENTRICLE:  RV Basal 3.50 cm  RV Mid   2.47 cm  RV Major 7.4 cm  TAPSE:   18.7 mm  RV s'    0.14 m/s    TRICUSPID VALVE/RVSP:                    Normal Ranges:  IVC Diam: 1.75 cm    PULMONIC VALVE:                          Normal Ranges:  PV Accel Time: 185 msec (>120ms)  PV Max Patricio:    0.8 m/s  (0.6-0.9m/s)  PV Max P.7 mmHg       [id number] Dustin Ramesh DO  Electronically signed on 3/18/2024 at 7:55:30 PM       Wall Scoring       ** Final **  ECG 12 lead  Accelerated Junctional rhythm  Abnormal ECG  When compared with ECG of 2024 21:43,  Junctional rhythm has replaced Sinus rhythm  Vent. rate has increased BY  71 BPM  QRS duration has decreased  ST no longer elevated in Lateral leads  Nonspecific T wave abnormality now evident in Anterior leads  See ED provider note for full interpretation and clinical correlation  CT angio brain attack head w IV contrast and post procedure, CT angio brain attack neck w IV contrast and post procedure, CT brain attack head wo IV contrast  Narrative: Interpreted By:  Yasmany Smalls,   STUDY:  CT BRAIN ATTACK HEAD WO IV CONTRAST; CT ANGIO BRAIN ATTACK HEAD W IV  CONTRAST AND POST PROCEDURE; CT ANGIO BRAIN ATTACK NECK W IV CONTRAST  AND POST PROCEDURE;  3/18/2024 3:00 am; 3/18/2024 3:08 am      INDICATION:  Signs/Symptoms:Stroke vs; Signs/Symptoms:facial drooping      COMPARISON:  Noncontrast head CT 2024      ACCESSION NUMBER(S):  [id number]; [id number]; [id number]      ORDERING CLINICIAN:  FARZANA MIX      TECHNIQUE:  Multiple contiguous axial  noncontrast images of the head were  obtained. Following IV contrast administration of  mL Omnipaque 350,  a CT angiography of the head and neck was performed. MIPS and 3D  reconstructions of the Hoh of Claudio and neck were created on an  independent workstation and reviewed.      FINDINGS:  NON-CONTRAST HEAD CT:      BRAIN PARENCHYMA:  No evidence of acute intraparenchymal hemorrhage  or parenchymal evidence of an acute large territory ischemic infarct.  No mass-effect, midline shift or effacement of cerebral sulci.  Gray-white matter distinction is preserved.      VENTRICLES and EXTRA-AXIAL SPACES:  No acute extra-axial or  intraventricular hemorrhage. Ventricles and sulci are age-concordant.      PARANASAL SINUSES/MASTOIDS:  No hemorrhage or air-fluid levels within  the visualized paranasal sinuses. The mastoids are well aerated.      CALVARIUM/ORBITS:  No skull fracture.  The orbits and globes are  intact to the extent visualized.      EXTRACRANIAL SOFT TISSUES: No discernible abnormality.              CTA NECK:              LEFT VERTEBRAL ARTERY:  No hemodynamically significant stenosis,  occlusion, or dissection.      LEFT COMMON/INTERNAL CAROTID ARTERY:  No hemodynamically significant  stenosis, occlusion, or dissection.      RIGHT VERTEBRAL ARTERY: Non dominant. No hemodynamically significant  stenosis, occlusion, or dissection.      RIGHT COMMON/INTERNAL CAROTID ARTERY: There is a focal saccular  outpouching along the lateral wall of the right ICA measuring 0.3 cm  x 0.2 cm consistent with a pseudoaneurysm (coronal MIP image 60,  series 408). This is located 0.7 cm from the skull base. There is no  hemodynamically significant stenosis or atherosclerosis of the  extracranial right ICA or right CCA.          The neck soft tissues show no evidence of mass, fluid collection, or  enlarged lymph nodes. There is no acute osseous abnormality.              CTA HEAD:      ANTERIOR CIRCULATION: No aneurysm.      -  Internal Carotid Arteries:  No hemodynamically significant stenosis  or occlusion.      - Middle Cerebral Arteries:  No hemodynamically significant stenosis  or occlusion.      - Anterior Cerebral Arteries:  No hemodynamically significant  stenosis or occlusion.          POSTERIOR CIRCULATION: No aneurysm.      - Intracranial Vertebral Arteries:  No hemodynamically significant  stenosis or occlusion.      - Basilar Artery: Diminutive in caliber owing to fetal origin of  bilateral posterior cerebral arteries. No hemodynamically significant  stenosis or occlusion.      - Posterior Cerebral Arteries: There is fetal origin of the bilateral  posterior cerebral arteries. No hemodynamically significant stenosis  or occlusion.      No arteriovenous malformation is visualized.  No pathologic intracranial enhancement or discrete mass.  The dural venous sinuses are patent.      MIPS and 3D reconstructions confirm the above findings.      Impression: 1. No evidence of an acute ischemic infarct, acute intracranial  hemorrhage, or mass effect.      2. 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 No associated  dissection flap, mural thickening, or significant stenosis.      3. The remainder of the CTA head and neck is unremarkable.          MACRO:  Yasmany Smalls discussed the significance and urgency of this  critical finding by NIKKIE ANGEL with  FARZANA MIX on 3/18/2024 at  3:19 am with confirmation of receipt .  (**-RCF-**) Findings:  See  findings.      Signed by: Yasmany Smalls 3/18/2024 3:19 AM  Dictation workstation:   [id number]  XR chest 1 view  Narrative: Interpreted By:  Madelaine Walker,   STUDY:  XR CHEST 1 VIEW;  3/18/2024 2:17 am      INDICATION:  Signs/Symptoms:CP      COMPARISON:  Chest x-ray 06/24/2014      ACCESSION NUMBER(S):  [id number]      ORDERING CLINICIAN:  FARZANA MIX      TECHNIQUE:  2 portable frontal views of the chest  were obtained.      FINDINGS:  The cardiomediastinal silhouette is within normal limits.      No focal consolidation, pleural effusion or pneumothorax.          Impression: 1.  No radiographic evidence of acute cardiopulmonary process.              MACRO:  None.      Signed by: Madelaine Walker 3/18/2024 2:51 AM  Dictation workstation:   [id number]

## 2024-03-19 NOTE — PROGRESS NOTES
Jean-Pierre Stubbs is a 43 y.o. male on day 0 of admission presenting with Stroke-like symptoms.      Subjective   The patient continues to have left sided weakness and he is developing stuttering.        MRI brain no evidence of stroke:  Within limitations of some motion, unremarkable MRI of the brain. No  diffusion restriction abnormality is present to suggest an acute  infarct       TTE :   1. Left ventricular systolic function is normal with a 60% estimated ejection fraction.   2. There is no evidence of mitral valve stenosis.   3. No evidence of mitral valve regurgitation.   4. Trace tricuspid regurgitation is visualized.   5. Aortic valve stenosis is not present.   6. The main pulmonary artery is normal in size, and position, with normal bifurcation into the left and right pulmonary arteries.   7. A bubble study using agitated saline was performed. Bubble study is positive. A small PFO (= 10 bubbles) was demonstrated.    Results for orders placed or performed during the hospital encounter of 03/18/24 (from the past 96 hour(s))   ECG 12 lead   Result Value Ref Range    Ventricular Rate 122 BPM    Atrial Rate 182 BPM    QRS Duration 70 ms    QT Interval 424 ms    QTC Calculation(Bazett) 604 ms    R Axis 88 degrees    T Axis 38 degrees    QRS Count 20 beats    Q Onset 223 ms    T Offset 435 ms    QTC Fredericia 537 ms   CBC and Auto Differential   Result Value Ref Range    WBC 7.8 4.4 - 11.3 x10*3/uL    nRBC 0.0 0.0 - 0.0 /100 WBCs    RBC 5.94 (H) 4.50 - 5.90 x10*6/uL    Hemoglobin 15.5 13.5 - 17.5 g/dL    Hematocrit 48.3 41.0 - 52.0 %    MCV 81 80 - 100 fL    MCH 26.1 26.0 - 34.0 pg    MCHC 32.1 32.0 - 36.0 g/dL    RDW 15.5 (H) 11.5 - 14.5 %    Platelets 229 150 - 450 x10*3/uL    Neutrophils % 68.4 40.0 - 80.0 %    Immature Granulocytes %, Automated 0.3 0.0 - 0.9 %    Lymphocytes % 20.5 13.0 - 44.0 %    Monocytes % 9.9 2.0 - 10.0 %    Eosinophils % 0.4 0.0 - 6.0 %    Basophils % 0.5 0.0 - 2.0 %    Neutrophils  Absolute 5.30 1.20 - 7.70 x10*3/uL    Immature Granulocytes Absolute, Automated 0.02 0.00 - 0.70 x10*3/uL    Lymphocytes Absolute 1.59 1.20 - 4.80 x10*3/uL    Monocytes Absolute 0.77 0.10 - 1.00 x10*3/uL    Eosinophils Absolute 0.03 0.00 - 0.70 x10*3/uL    Basophils Absolute 0.04 0.00 - 0.10 x10*3/uL   Comprehensive metabolic panel   Result Value Ref Range    Glucose 174 (H) 74 - 99 mg/dL    Sodium 138 136 - 145 mmol/L    Potassium 3.5 3.5 - 5.3 mmol/L    Chloride 101 98 - 107 mmol/L    Bicarbonate 26 21 - 32 mmol/L    Anion Gap 15 10 - 20 mmol/L    Urea Nitrogen 11 6 - 23 mg/dL    Creatinine 0.95 0.50 - 1.30 mg/dL    eGFR >90 >60 mL/min/1.73m*2    Calcium 9.8 8.6 - 10.3 mg/dL    Albumin 4.7 3.4 - 5.0 g/dL    Alkaline Phosphatase 60 33 - 120 U/L    Total Protein 7.6 6.4 - 8.2 g/dL    AST 15 9 - 39 U/L    Bilirubin, Total 0.4 0.0 - 1.2 mg/dL    ALT 18 10 - 52 U/L   Magnesium   Result Value Ref Range    Magnesium 1.99 1.60 - 2.40 mg/dL   Troponin I, High Sensitivity, Initial   Result Value Ref Range    Troponin I, High Sensitivity <3 0 - 20 ng/L   Troponin, High Sensitivity, 1 Hour   Result Value Ref Range    Troponin I, High Sensitivity 3 0 - 20 ng/L   Lipid Panel   Result Value Ref Range    Cholesterol 94 0 - 199 mg/dL    HDL-Cholesterol 31.2 mg/dL    Cholesterol/HDL Ratio 3.0     LDL Calculated 42 <=99 mg/dL    VLDL 21 0 - 40 mg/dL    Triglycerides 105 0 - 149 mg/dL    Non HDL Cholesterol 63 0 - 149 mg/dL   Hemoglobin A1C   Result Value Ref Range    Hemoglobin A1C 5.1 see below %    Estimated Average Glucose 100 Not Established mg/dL   TSH   Result Value Ref Range    Thyroid Stimulating Hormone 1.82 0.44 - 3.98 mIU/L   Drug Screen, Urine   Result Value Ref Range    Amphetamine Screen, Urine Presumptive Negative Presumptive Negative    Barbiturate Screen, Urine Presumptive Negative Presumptive Negative    Benzodiazepines Screen, Urine Presumptive Negative Presumptive Negative    Cannabinoid Screen, Urine Presumptive  "Positive (A) Presumptive Negative    Cocaine Metabolite Screen, Urine Presumptive Positive (A) Presumptive Negative    Fentanyl Screen, Urine Presumptive Negative Presumptive Negative    Opiate Screen, Urine Presumptive Negative Presumptive Negative    Oxycodone Screen, Urine Presumptive Negative Presumptive Negative    PCP Screen, Urine Presumptive Negative Presumptive Negative    Methadone Screen, Urine Presumptive Negative Presumptive Negative   POCT GLUCOSE   Result Value Ref Range    POCT Glucose 103 (H) 74 - 99 mg/dL   Transthoracic Echo (TTE) Complete   Result Value Ref Range    AV mn grad 4.0 mmHg    AV pk shelly 1.40 m/s    LV biplane EF 56 %    LVOT diam 2.10 cm    MV E/A ratio 1.45     Tricuspid annular plane systolic excursion 1.9 cm    MV avg E/e' ratio 8.51     LA vol index A/L 20.3 ml/m2    RV free wall pk S' 13.90 cm/s    LVIDd 4.48 cm    Aortic Valve Area by Continuity of Peak Velocity 2.77 cm2    AV pk grad 7.8 mmHg    Aortic Valve Area by Continuity of VTI 2.61 cm2    LV A4C EF 56.8    POCT GLUCOSE   Result Value Ref Range    POCT Glucose 122 (H) 74 - 99 mg/dL   POCT GLUCOSE   Result Value Ref Range    POCT Glucose 120 (H) 74 - 99 mg/dL   POCT GLUCOSE   Result Value Ref Range    POCT Glucose 90 74 - 99 mg/dL   POCT GLUCOSE   Result Value Ref Range    POCT Glucose 99 74 - 99 mg/dL   POCT GLUCOSE   Result Value Ref Range    POCT Glucose 133 (H) 74 - 99 mg/dL              Objective     Last Recorded Vitals  Blood pressure 123/76, pulse 61, temperature 36.7 °C (98.1 °F), temperature source Temporal, resp. rate 16, height 1.753 m (5' 9\"), weight 99.8 kg (220 lb), SpO2 97 %.  Left sided weakness.     Physical Exam  Neurological Exam  Relevant Results        NIH Stroke Scale  1A. Level of Consciousness: Alert, Keenly Responsive  1B. Ask Month and Age: Both Questions Right  1C. Blink Eyes & Squeeze Hands: Performs Both Tasks  2. Best Gaze: Normal  3. Visual: No Visual Loss  4. Facial Palsy: Partial " Paralysis  5A. Motor - Left Arm: Some Effort Against Gravity  5B. Motor - Right Arm: No Drift  6A. Motor - Left Leg: Some Effort Against Gravity  6B. Motor - Right Leg: No Drift  7. Limb Ataxia: Absent  8. Sensory Loss: Normal  9. Best Language: No Aphasia  10. Dysarthria: Mild-to-Moderate Dysarthria  11. Extinction and Inattention: No Abnormality  NIH Stroke Scale: 7           Hobbs Coma Scale  Best Eye Response: Spontaneous  Best Verbal Response: Oriented  Best Motor Response: Follows commands  Hobbs Coma Scale Score: 15                             Assessment/Plan      Principal Problem:    Stroke-like symptoms  Active Problems:    Left-sided weakness    Dysarthria    Acute onset of left hemiparesis, facial droop, and a stroke scale is 7 likely related to right internal capsule stroke by history of hypertension, cocaine use.  NIH stroke scale is 7  Patient refused IV TNK treatment.  I agree with stroke workup as already initiated, including:     Brain MR is negative ( negative MRI of the brain doesn't r/o as small lacunar stroke)     Transthoracic echocardiogram reported PFO ( I suggest cardiology evaluation for PFO management from cardiac perspective by a stroke), and Doppler study of bilateral lower extremities to r/o DVT.    I suggest:  Aspirin 81 mg p.o. daily    Lipitor with LDL goal < 70    PT/OT evaluation ( will benefit from acute rehab)    To discontinue smoking and Cocaine use.       Cardiac telemetry      To allow permissive hypertension for now, and to aim for < 120/70 mm/hg in one week.     OT evaluation for capacity to work, and drive in 6 weeks post stroke ( till then, no driving or working).      I spent 40 minutes in the professional and overall care of this patient.      Chasity Pandya MD

## 2024-03-20 ENCOUNTER — APPOINTMENT (OUTPATIENT)
Dept: CARDIOLOGY | Facility: HOSPITAL | Age: 44
DRG: 065 | End: 2024-03-20
Payer: COMMERCIAL

## 2024-03-20 LAB
ATRIAL RATE: 65 BPM
GLUCOSE BLD MANUAL STRIP-MCNC: 102 MG/DL (ref 74–99)
GLUCOSE BLD MANUAL STRIP-MCNC: 116 MG/DL (ref 74–99)
GLUCOSE BLD MANUAL STRIP-MCNC: 160 MG/DL (ref 74–99)
GLUCOSE BLD MANUAL STRIP-MCNC: 96 MG/DL (ref 74–99)
P AXIS: 54 DEGREES
P OFFSET: 181 MS
P ONSET: 129 MS
PR INTERVAL: 192 MS
Q ONSET: 225 MS
QRS COUNT: 11 BEATS
QRS DURATION: 90 MS
QT INTERVAL: 382 MS
QTC CALCULATION(BAZETT): 397 MS
QTC FREDERICIA: 392 MS
R AXIS: 72 DEGREES
T AXIS: 27 DEGREES
T OFFSET: 416 MS
VENTRICULAR RATE: 65 BPM

## 2024-03-20 PROCEDURE — 93005 ELECTROCARDIOGRAM TRACING: CPT

## 2024-03-20 PROCEDURE — 2500000005 HC RX 250 GENERAL PHARMACY W/O HCPCS: Performed by: INTERNAL MEDICINE

## 2024-03-20 PROCEDURE — 93320 DOPPLER ECHO COMPLETE: CPT

## 2024-03-20 PROCEDURE — 93010 ELECTROCARDIOGRAM REPORT: CPT | Performed by: INTERNAL MEDICINE

## 2024-03-20 PROCEDURE — 2500000001 HC RX 250 WO HCPCS SELF ADMINISTERED DRUGS (ALT 637 FOR MEDICARE OP): Performed by: NURSE PRACTITIONER

## 2024-03-20 PROCEDURE — 2500000001 HC RX 250 WO HCPCS SELF ADMINISTERED DRUGS (ALT 637 FOR MEDICARE OP): Performed by: INTERNAL MEDICINE

## 2024-03-20 PROCEDURE — 99152 MOD SED SAME PHYS/QHP 5/>YRS: CPT

## 2024-03-20 PROCEDURE — 2500000004 HC RX 250 GENERAL PHARMACY W/ HCPCS (ALT 636 FOR OP/ED): Performed by: INTERNAL MEDICINE

## 2024-03-20 PROCEDURE — 93312 ECHO TRANSESOPHAGEAL: CPT | Performed by: INTERNAL MEDICINE

## 2024-03-20 PROCEDURE — 99232 SBSQ HOSP IP/OBS MODERATE 35: CPT | Performed by: INTERNAL MEDICINE

## 2024-03-20 PROCEDURE — 1200000002 HC GENERAL ROOM WITH TELEMETRY DAILY

## 2024-03-20 PROCEDURE — 93320 DOPPLER ECHO COMPLETE: CPT | Performed by: INTERNAL MEDICINE

## 2024-03-20 PROCEDURE — 2500000004 HC RX 250 GENERAL PHARMACY W/ HCPCS (ALT 636 FOR OP/ED): Performed by: STUDENT IN AN ORGANIZED HEALTH CARE EDUCATION/TRAINING PROGRAM

## 2024-03-20 PROCEDURE — 99232 SBSQ HOSP IP/OBS MODERATE 35: CPT | Performed by: SPECIALIST

## 2024-03-20 PROCEDURE — 93325 DOPPLER ECHO COLOR FLOW MAPG: CPT | Performed by: INTERNAL MEDICINE

## 2024-03-20 PROCEDURE — 82947 ASSAY GLUCOSE BLOOD QUANT: CPT

## 2024-03-20 PROCEDURE — 99152 MOD SED SAME PHYS/QHP 5/>YRS: CPT | Performed by: INTERNAL MEDICINE

## 2024-03-20 PROCEDURE — 99223 1ST HOSP IP/OBS HIGH 75: CPT | Performed by: INTERNAL MEDICINE

## 2024-03-20 RX ORDER — CLOPIDOGREL BISULFATE 75 MG/1
75 TABLET ORAL DAILY
Status: DISCONTINUED | OUTPATIENT
Start: 2024-03-20 | End: 2024-03-21 | Stop reason: HOSPADM

## 2024-03-20 RX ORDER — LIDOCAINE HYDROCHLORIDE 20 MG/ML
JELLY TOPICAL AS NEEDED
Status: DISCONTINUED | OUTPATIENT
Start: 2024-03-20 | End: 2024-03-21 | Stop reason: HOSPADM

## 2024-03-20 RX ORDER — FENTANYL CITRATE 50 UG/ML
INJECTION, SOLUTION INTRAMUSCULAR; INTRAVENOUS AS NEEDED
Status: DISCONTINUED | OUTPATIENT
Start: 2024-03-20 | End: 2024-03-21 | Stop reason: HOSPADM

## 2024-03-20 RX ORDER — MIDAZOLAM HYDROCHLORIDE 1 MG/ML
INJECTION, SOLUTION INTRAMUSCULAR; INTRAVENOUS AS NEEDED
Status: DISCONTINUED | OUTPATIENT
Start: 2024-03-20 | End: 2024-03-21 | Stop reason: HOSPADM

## 2024-03-20 RX ORDER — ONDANSETRON HYDROCHLORIDE 2 MG/ML
4 INJECTION, SOLUTION INTRAVENOUS EVERY 4 HOURS PRN
Status: DISCONTINUED | OUTPATIENT
Start: 2024-03-20 | End: 2024-03-21 | Stop reason: HOSPADM

## 2024-03-20 RX ORDER — CLOPIDOGREL BISULFATE 75 MG/1
75 TABLET ORAL DAILY
Status: DISCONTINUED | OUTPATIENT
Start: 2024-03-20 | End: 2024-03-20

## 2024-03-20 RX ADMIN — FENTANYL CITRATE 25 MCG: 50 INJECTION, SOLUTION INTRAMUSCULAR; INTRAVENOUS at 13:22

## 2024-03-20 RX ADMIN — CLOPIDOGREL 75 MG: 75 TABLET ORAL at 15:24

## 2024-03-20 RX ADMIN — ASPIRIN 81 MG: 81 TABLET, COATED ORAL at 08:49

## 2024-03-20 RX ADMIN — FENTANYL CITRATE 25 MCG: 50 INJECTION, SOLUTION INTRAMUSCULAR; INTRAVENOUS at 13:24

## 2024-03-20 RX ADMIN — AMLODIPINE BESYLATE 2.5 MG: 5 TABLET ORAL at 08:50

## 2024-03-20 RX ADMIN — MIDAZOLAM 2 MG: 1 INJECTION INTRAMUSCULAR; INTRAVENOUS at 13:18

## 2024-03-20 RX ADMIN — LIDOCAINE HYDROCHLORIDE 1 APPLICATION: 20 JELLY TOPICAL at 13:10

## 2024-03-20 RX ADMIN — ATORVASTATIN CALCIUM 80 MG: 80 TABLET, FILM COATED ORAL at 21:56

## 2024-03-20 RX ADMIN — BENZOCAINE, BUTAMBEN, AND TETRACAINE HYDROCHLORIDE 1 SPRAY: .028; .004; .004 AEROSOL, SPRAY TOPICAL at 13:08

## 2024-03-20 RX ADMIN — MIDAZOLAM 1 MG: 1 INJECTION INTRAMUSCULAR; INTRAVENOUS at 13:20

## 2024-03-20 RX ADMIN — FENTANYL CITRATE 50 MCG: 50 INJECTION, SOLUTION INTRAMUSCULAR; INTRAVENOUS at 13:19

## 2024-03-20 RX ADMIN — ENOXAPARIN SODIUM 40 MG: 40 INJECTION SUBCUTANEOUS at 08:49

## 2024-03-20 RX ADMIN — BENZOCAINE, BUTAMBEN, AND TETRACAINE HYDROCHLORIDE 1 SPRAY: .028; .004; .004 AEROSOL, SPRAY TOPICAL at 13:05

## 2024-03-20 ASSESSMENT — COGNITIVE AND FUNCTIONAL STATUS - GENERAL
DAILY ACTIVITIY SCORE: 24
MOBILITY SCORE: 23
DAILY ACTIVITIY SCORE: 24
MOBILITY SCORE: 23
CLIMB 3 TO 5 STEPS WITH RAILING: A LITTLE
CLIMB 3 TO 5 STEPS WITH RAILING: A LITTLE

## 2024-03-20 ASSESSMENT — PAIN SCALES - GENERAL: PAINLEVEL_OUTOF10: 0 - NO PAIN

## 2024-03-20 NOTE — CONSULTS
Inpatient consult to Cardiology  Consult performed by: ARELI Peacock-CNP  Consult ordered by: Vinicio Huffman MD  Reason for consult: PFO      Cardiology Consult Note      Date:   3/20/2024  Patient name:  Jean-Pierre Stubbs  Date of admission:  3/18/2024  1:13 AM  MRN:   [id number]  YOB: 1980  Time of Consult:  8:47 AM    Consulting Cardiologist: ARELI Sanders, CNP  Primary Cardiologist:   None     Referring Provider: Dr Huffman      Admission Diagnosis:     Stroke-like symptoms      History of Present Illness:      Jean-Pierre Stubbs is a 43 y.o.  male patient who is being at the request of Dr. Huffman for inpatient consultation of  CVA possible PFO . He was admitted on 3/18/2024.  Previous [facility] and [facility] records have been reviewed in detail.   Patient with a history of hypertension, anxiety, substance abuse came into the emergency department on Sunday was brought in by his significant other.  He states that he was smoking weed then he started smoking cocaine he developed this weird sensation on his left side when she was driving a man he developed slurred speech with facial droop on the left side with left leg weakness and left arm weakness.  Seen and evaluated by neurology.  They did do an echo that showed a small PFO they asked cardiology to get involved with his case.  He still does have some slurred speech.  3 out of 5 hand grasp and foot pumps on the left side 5 out of 5 hand grasp and foot pumps on the right side he does have a left-sided facial droop.  He denies having any chest pain, fever, chills, nausea, vomiting, PND, orthopnea, claudication  EKG done on 3/18/2024 sinus tachycardia  Telemetry ordered    Cardiac history  None        Allergies:     Allergies   Allergen Reactions    Tramadol Other     Tiredness         Past Medical History:     Past Medical History:   Diagnosis Date    Anxiety     Asthma     Chronic back pain     Depression      Diverticulitis     Fatty liver     IBS (irritable bowel syndrome)     TIA (transient ischemic attack)        Past Surgical History:     No past surgical history on file.    Family History:     No family history on file.    Social History:     Social History     Tobacco Use    Smoking status: Every Day     Types: Cigarettes    Smokeless tobacco: Current       CURRENT INPATIENT MEDICATIONS    amLODIPine, 2.5 mg, oral, Daily  aspirin, 81 mg, oral, Daily  atorvastatin, 80 mg, oral, Nightly  enoxaparin, 40 mg, subcutaneous, Daily  polyethylene glycol, 17 g, oral, Daily         Current Outpatient Medications   Medication Instructions    amLODIPine (NORVASC) 5 mg, oral, Daily        Review of Systems:      12 point review of systems was obtained in detail and is negative other than that detailed above.    Vital Signs:     Vitals:    03/19/24 2000 03/20/24 0000 03/20/24 0400 03/20/24 0743   BP: 131/67 133/72 112/64 110/64   BP Location: Left arm  Left arm Right arm   Patient Position: Lying  Lying Lying   Pulse: 78 92 68 56   Resp: 16 20 16 18   Temp: 37.1 °C (98.8 °F) 36.8 °C (98.2 °F) 36.9 °C (98.4 °F) 36.4 °C (97.5 °F)   TempSrc: Temporal Temporal Temporal Temporal   SpO2: 98% 97% 96% 95%   Weight:       Height:         No intake or output data in the 24 hours ending 03/20/24 0847    Wt Readings from Last 4 Encounters:   03/18/24 99.8 kg (220 lb)   02/13/24 95.3 kg (210 lb)   12/06/23 95 kg (209 lb 7 oz)       Physical Examination:     GENERAL APPEARANCE: Well developed, well nourished, in no acute distress.  Left-sided facial droop  CHEST: Symmetric and non-tender.  INTEGUMENT: Skin warm and dry, without gross excoriationis or lesions.  HEENT: No gross abnormalities of conjunctiva, teeth, gums, oral mucosa left-sided facial droop  NECK: Supple, no JVD, no bruit. Thyroid not palpable. Carotid upstrokes normal.  NEURO/PSHCY: Alert and oriented x3; left-sided weakness and garbled speech  LUNGS: Clear to auscultation  bilaterally; normal respiratory effort.  HEART: Rate and rhythm regular with no evident murmur; no gallop appreciated. There are no rubs, clicks or heaves. PMI nondisplaced.  ABDOMEN: Soft, nontender, no palpable hepatosplenomegaly, no mases, no bruits. Abdominal aorta not noted to be enlarged.  MUSCULOSKELETAL: Ambulatory with normal tandem gait.  EXTREMITIES: Warm with good color, no clubbing or cyanois. There is no edema noted.  PERIPHERAL VASCULAR: 2+ peripheral pulses      Lab:     CBC:   Results from last 7 days   Lab Units 03/18/24  0214   WBC AUTO x10*3/uL 7.8   RBC AUTO x10*6/uL 5.94*   HEMOGLOBIN g/dL 15.5   HEMATOCRIT % 48.3   MCV fL 81   MCH pg 26.1   MCHC g/dL 32.1   RDW % 15.5*   PLATELETS AUTO x10*3/uL 229     CMP:    Results from last 7 days   Lab Units 03/18/24  0214   SODIUM mmol/L 138   POTASSIUM mmol/L 3.5   CHLORIDE mmol/L 101   CO2 mmol/L 26   BUN mg/dL 11   CREATININE mg/dL 0.95   GLUCOSE mg/dL 174*   PROTEIN TOTAL g/dL 7.6   CALCIUM mg/dL 9.8   BILIRUBIN TOTAL mg/dL 0.4   ALK PHOS U/L 60   AST U/L 15   ALT U/L 18     BMP:    Results from last 7 days   Lab Units 03/18/24  0214   SODIUM mmol/L 138   POTASSIUM mmol/L 3.5   CHLORIDE mmol/L 101   CO2 mmol/L 26   BUN mg/dL 11   CREATININE mg/dL 0.95   CALCIUM mg/dL 9.8   GLUCOSE mg/dL 174*     Magnesium:  Results from last 7 days   Lab Units 03/18/24  0214   MAGNESIUM mg/dL 1.99     Troponin:    Results from last 7 days   Lab Units 03/18/24  0333 03/18/24  0214   TROPHS ng/L 3 <3     BNP:     Lipid Panel:  Results from last 7 days   Lab Units 03/18/24  0638   HDL mg/dL 31.2   CHOLESTEROL/HDL RATIO  3.0   VLDL mg/dL 21   TRIGLYCERIDES mg/dL 105   NON HDL CHOL. mg/dL 63        Diagnostic Studies:   @No results found for this or any previous visit.    Lower extremity venous duplex bilateral    Result Date: 3/20/2024  Interpreted By:  Cosme Person, STUDY: [facility] LOWER EXTREMITY VENOUS DUPLEX BILATERAL;  3/19/2024 7:36 pm   INDICATION:  Signs/Symptoms:Rule out DVT, by a history of stroke and PFO..   COMPARISON: None.   ACCESSION NUMBER(S): [id number]   ORDERING CLINICIAN: JOHNSON HAYNES   TECHNIQUE: Vascular ultrasound of the bilateral lower extremities was performed. Real-time compression views as well as Gray scale, color Doppler and spectral Doppler waveform analysis was performed.   FINDINGS: Evaluation of the visualized portions of the bilateral common femoral, proximal, mid, and distal femoral, and popliteal veins was performed.  Evaluation of the visualized portions of the posterior tibial and peroneal veins was also performed.   Limitations: None   The evaluated veins demonstrate normal compressibility. There is intact venous flow demonstrating normal respiratory variability and normal augmentation of flow with calf compression.         No sonographic evidence for deep vein thrombosis within the evaluated veins of the bilateral lower extremities.   MACRO: None   Signed by: Cosme Person 3/20/2024 6:11 AM Dictation workstation:   [id number]    ECG 12 lead    Result Date: 3/19/2024  Accelerated Junctional rhythm Abnormal ECG When compared with ECG of 13-FEB-2024 21:43, Junctional rhythm has replaced Sinus rhythm Vent. rate has increased BY  71 BPM QRS duration has decreased ST no longer elevated in Lateral leads Nonspecific T wave abnormality now evident in Anterior leads See ED provider note for full interpretation and clinical correlation Confirmed by Kiersten Green ([id number]) on 3/19/2024 11:53:48 AM    MR brain wo IV contrast    Result Date: 3/18/2024  Interpreted By:  Delfin Warren, STUDY: MR BRAIN WO IV CONTRAST;  3/18/2024 9:20 pm   INDICATION: Signs/Symptoms:Stroke like symptoms.   COMPARISON: Same day noncontrast CT head; MRI of the brain dated 12/06/2023.   ACCESSION NUMBER(S): [id number]   ORDERING CLINICIAN: AMEENA VELASCO   TECHNIQUE: Axial T2, FLAIR, DWI, gradient echo T2 and sagittal and coronal T1 weighted images of brain  were acquired.   FINDINGS: Exam is degraded by some motion.   Within limitations of the exam no diffusion restriction abnormality is identified to suggest an acute infarct.   There is no definite evidence of intracranial hemorrhage. No mass effect or midline shift is present. No pathologic hemosiderin staining is appreciated.   No ventricular dilatation is evident. Basal cisterns are patent. No extra-axial fluid collections are identified.   No discrete parenchymal signal abnormality is identified. Visualized large arterial flow voids, including intracranial carotid and basilar artery appear preserved.   No abnormal fluid signal is present in the paranasal sinuses or mastoid air cells bilaterally.       Within limitations of some motion, unremarkable MRI of the brain. No diffusion restriction abnormality is present to suggest an acute infarct   MACRO: None   Signed by: Delfin Warren 3/18/2024 9:32 PM Dictation workstation:   [id number]    CT head wo IV contrast    Result Date: 3/18/2024  Interpreted By:  Madelaine Walker, STUDY: CT HEAD WO IV CONTRAST;  3/18/2024 7:13 pm   INDICATION: Signs/Symptoms:L sided weakness, L facial droop, dysarthria, concerning for acute stroke, had worsening speech, L sided weakness, vomiting.   COMPARISON: CT head earlier same date 03/18/2024.   ACCESSION NUMBER(S): [id number]   ORDERING CLINICIAN: AMEENA VELASCO   TECHNIQUE: Noncontrast CT axial images were obtained through the brain.  Coronal and sagittal reformats were performed.   FINDINGS: The ventricles, sulci and basal cisterns are within normal limits. The grey-white matter differentiation is intact. No acute territorial infarct.  There is no mass effect or midline shift. There is no extraaxial fluid collection.  There is no intracranial hemorrhage. No depressed calvarial fracture. No air-fluid levels at the visualized paranasal sinuses. The mastoid air cells are clear.       1.  No acute intracranial hemorrhage or  acute territorial infarct. If there is persistent clinical concern for acute ischemia, MRI can be obtained for further evaluation.         MACRO: None.   Signed by: Madelaine Walker 3/18/2024 8:09 PM Dictation workstation:   [id number]    Transthoracic Echo (TTE) Complete    Result Date: 3/18/2024          [place] [address]  Tel [phone] Fax [phone] TRANSTHORACIC ECHOCARDIOGRAM REPORT  Patient Name:      TICO Herrera Physician:    [id number] Dustin Ramesh DO Study Date:        3/18/2024            Ordering Provider:    [id number] DOROTHY OLIVIA MRN/PID:           [id number]             Fellow: Accession#:        [id number]         Nurse:                Philip Quiñonez RN Date of Birth/Age: 1980 / 43 years Sonographer:          Shyann Kang RDCS Gender:            M                    Additional Staff: Height:            175.26 cm            Admit Date:           3/18/2024 Weight:            99.79 kg             Admission Status:     Inpatient -                                                               Routine BSA / BMI:         2.15 m2 / 32.49      Department Location:  [place] [address]                                      Echo Lab Blood Pressure: 149 /84 mmHg Study Type:    TRANSTHORACIC ECHO (TTE) COMPLETE Diagnosis/ICD: Transient cerebral ischemic attack, unspecified (NOT on                LCD)-G45.9 Indication:    Transischemic Attack CPT Codes:     Echo Complete w Full Doppler-[id number] Patient History: Pertinent History: Dyspnea, TIA and Polysubstance abuse. Study Detail: The following Echo studies were performed: 2D, M-Mode, Doppler and               color flow. Agitated saline used as a contrast agent for               intraseptal flow  evaluation. The patient was awake.  PHYSICIAN INTERPRETATION: Left Ventricle: Left ventricular systolic function is normal, with an estimated ejection fraction of 60%. There are no regional wall motion abnormalities. The left ventricular cavity size is normal. Spectral Doppler shows a normal pattern of left ventricular diastolic filling. LV Wall Scoring: All segments are normal. Left Atrium: The left atrium is mildly dilated. A bubble study using agitated saline was performed. Bubble study is positive. A small PFO (= 10 bubbles) was demonstrated. A bubble study shows that an intrapulmonary shunting is not present. Right Ventricle: The right ventricle is normal in size. There is normal right ventricular global systolic function. Right Atrium: The right atrium is normal in size. Aortic Valve: The aortic valve appears structurally normal. The aortic valve appears tricuspid. There is no evidence of aortic valve stenosis. There is no evidence of aortic valve regurgitation. The peak instantaneous gradient of the aortic valve is 7.8 mmHg. The mean gradient of the aortic valve is 4.0 mmHg. Mitral Valve: The mitral valve is normal in structure. There is no evidence of mitral valve stenosis. There is normal mitral valve leaflet mobility. There is no evidence of mitral valve regurgitation. Tricuspid Valve: The tricuspid valve is structurally normal. There is normal tricuspid valve leaflet mobility. There is trace tricuspid regurgitation. Pulmonic Valve: The pulmonic valve is structurally normal. There is no indication of pulmonic valve regurgitation. Pericardium: There is no pericardial effusion noted. Aorta: The aortic root is normal. Pulmonary Artery: The main pulmonary artery is normal in size, and position, with normal bifurcation into the left and right pulmonary arteries. Systemic Veins: The inferior vena cava appears to be of normal size. In comparison to the previous echocardiogram(s): Prior examinations are available  and were reviewed for comparison purposes. The left ventricular function is unchanged. The left ventricular diastolic function is normal.  CONCLUSIONS:  1. Left ventricular systolic function is normal with a 60% estimated ejection fraction.  2. There is no evidence of mitral valve stenosis.  3. No evidence of mitral valve regurgitation.  4. Trace tricuspid regurgitation is visualized.  5. Aortic valve stenosis is not present.  6. The main pulmonary artery is normal in size, and position, with normal bifurcation into the left and right pulmonary arteries.  7. A bubble study using agitated saline was performed. Bubble study is positive. A small PFO (= 10 bubbles) was demonstrated. QUANTITATIVE DATA SUMMARY: 2D MEASUREMENTS:                          Normal Ranges: Ao Root d:     3.10 cm   (2.0-3.7cm) LAs:           4.00 cm   (2.7-4.0cm) IVSd:          1.03 cm   (0.6-1.1cm) LVPWd:         0.98 cm   (0.6-1.1cm) LVIDd:         4.48 cm   (3.9-5.9cm) LVIDs:         2.98 cm LV Mass Index: 71.3 g/m2 LV % FS        33.5 % LA VOLUME:                               Normal Ranges: LA Vol A4C:        45.8 ml    (22+/-6mL/m2) LA Vol A2C:        40.9 ml LA Vol BP:         43.6 ml LA Vol Index A4C:  21.3ml/m2 LA Vol Index A2C:  19.0 ml/m2 LA Vol Index BP:   20.3 ml/m2 LA Area A4C:       17.3 cm2 LA Area A2C:       16.2 cm2 LA Major Axis A4C: 5.6 cm LA Major Axis A2C: 5.5 cm LA Volume Index:   19.2 ml/m2 RA VOLUME BY A/L METHOD:                               Normal Ranges: RA Vol A4C:        34.1 ml    (8.3-19.5ml) RA Vol Index A4C:  15.8 ml/m2 RA Area A4C:       15.1 cm2 RA Major Axis A4C: 5.7 cm AORTA MEASUREMENTS:                    Normal Ranges: Asc Ao, d: 3.00 cm (2.1-3.4cm) LV SYSTOLIC FUNCTION BY 2D PLANIMETRY (MOD):                     Normal Ranges: EF-A4C View: 56.8 % (>=55%) EF-A2C View: 60.2 % EF-Biplane:  55.9 % LV DIASTOLIC FUNCTION:                        Normal Ranges: MV Peak E:    0.93 m/s (0.7-1.2 m/s) MV Peak A:     0.64 m/s (0.42-0.7 m/s) E/A Ratio:    1.45     (1.0-2.2) MV e'         0.11 m/s (>8.0) MV lateral e' 0.11 m/s MV medial e'  0.11 m/s E/e' Ratio:   8.51     (<8.0) MITRAL VALVE:                 Normal Ranges: MV DT: 181 msec (150-240msec) AORTIC VALVE:                                   Normal Ranges: AoV Vmax:                1.40 m/s (<=1.7m/s) AoV Peak P.8 mmHg (<20mmHg) AoV Mean P.0 mmHg (1.7-11.5mmHg) LVOT Max Patricio:            1.12 m/s (<=1.1m/s) AoV VTI:                 30.70 cm (18-25cm) LVOT VTI:                23.10 cm LVOT Diameter:           2.10 cm  (1.8-2.4cm) AoV Area, VTI:           2.61 cm2 (2.5-5.5cm2) AoV Area,Vmax:           2.77 cm2 (2.5-4.5cm2) AoV Dimensionless Index: 0.75  RIGHT VENTRICLE: RV Basal 3.50 cm RV Mid   2.47 cm RV Major 7.4 cm TAPSE:   18.7 mm RV s'    0.14 m/s TRICUSPID VALVE/RVSP:                   Normal Ranges: IVC Diam: 1.75 cm PULMONIC VALVE:                         Normal Ranges: PV Accel Time: 185 msec (>120ms) PV Max Patricio:    0.8 m/s  (0.6-0.9m/s) PV Max P.7 mmHg  [id number] Dustin Ramesh DO Electronically signed on 3/18/2024 at 7:55:30 PM  Wall Scoring  ** Final **     CT angio brain attack head w IV contrast and post procedure    Result Date: 3/18/2024  Interpreted By:  Yasmany Smalls, STUDY: CT BRAIN ATTACK HEAD WO IV CONTRAST; CT ANGIO BRAIN ATTACK HEAD W IV CONTRAST AND POST PROCEDURE; CT ANGIO BRAIN ATTACK NECK W IV CONTRAST AND POST PROCEDURE;  3/18/2024 3:00 am; 3/18/2024 3:08 am   INDICATION: Signs/Symptoms:Stroke vs; Signs/Symptoms:facial drooping   COMPARISON: Noncontrast head CT 2024   ACCESSION NUMBER(S): [id number]; [id number]; [id number]   ORDERING CLINICIAN: FARZANA MIX   TECHNIQUE: Multiple contiguous axial noncontrast images of the head were obtained. Following IV contrast administration of  mL Omnipaque 350, a CT angiography of the head and neck was performed. MIPS and 3D reconstructions of the [place] of  [place] and neck were created on an independent workstation and reviewed.   FINDINGS: NON-CONTRAST HEAD CT:   BRAIN PARENCHYMA:  No evidence of acute intraparenchymal hemorrhage or parenchymal evidence of an acute large territory ischemic infarct. No mass-effect, midline shift or effacement of cerebral sulci. Gray-white matter distinction is preserved.   VENTRICLES and EXTRA-AXIAL SPACES:  No acute extra-axial or intraventricular hemorrhage. Ventricles and sulci are age-concordant.   PARANASAL SINUSES/MASTOIDS:  No hemorrhage or air-fluid levels within the visualized paranasal sinuses. The mastoids are well aerated.   CALVARIUM/ORBITS:  No skull fracture.  The orbits and globes are intact to the extent visualized.   EXTRACRANIAL SOFT TISSUES: No discernible abnormality.       CTA NECK:       LEFT VERTEBRAL ARTERY:  No hemodynamically significant stenosis, occlusion, or dissection.   LEFT COMMON/INTERNAL CAROTID ARTERY:  No hemodynamically significant stenosis, occlusion, or dissection.   RIGHT VERTEBRAL ARTERY: Non dominant. No hemodynamically significant stenosis, occlusion, or dissection.   RIGHT COMMON/INTERNAL CAROTID ARTERY: There is a focal saccular outpouching along the lateral wall of the right ICA measuring 0.3 cm x 0.2 cm consistent with a pseudoaneurysm (coronal MIP image 60, series 408). This is located 0.7 cm from the skull base. There is no hemodynamically significant stenosis or atherosclerosis of the extracranial right ICA or right CCA.     The neck soft tissues show no evidence of mass, fluid collection, or enlarged lymph nodes. There is no acute osseous abnormality.       CTA HEAD:   ANTERIOR CIRCULATION: No aneurysm.   - Internal Carotid Arteries:  No hemodynamically significant stenosis or occlusion.   - Middle Cerebral Arteries:  No hemodynamically significant stenosis or occlusion.   - Anterior Cerebral Arteries:  No hemodynamically significant stenosis or occlusion.     POSTERIOR CIRCULATION:  No aneurysm.   - Intracranial Vertebral Arteries:  No hemodynamically significant stenosis or occlusion.   - Basilar Artery: Diminutive in caliber owing to fetal origin of bilateral posterior cerebral arteries. No hemodynamically significant stenosis or occlusion.   - Posterior Cerebral Arteries: There is fetal origin of the bilateral posterior cerebral arteries. No hemodynamically significant stenosis or occlusion.   No arteriovenous malformation is visualized. No pathologic intracranial enhancement or discrete mass. The dural venous sinuses are patent.   MIPS and 3D reconstructions confirm the above findings.       1. No evidence of an acute ischemic infarct, acute intracranial hemorrhage, or mass effect.   2. 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 No associated dissection flap, mural thickening, or significant stenosis.   3. The remainder of the CTA head and neck is unremarkable.     MACRO: Yasmany Smalls discussed the significance and urgency of this critical finding by NIKKIE ANGEL with  FARZANA MIX on 3/18/2024 at 3:19 am with confirmation of receipt .  (**-RCF-**) Findings:  See findings.   Signed by: Yasmany Smalls 3/18/2024 3:19 AM Dictation workstation:   [id number]    CT angio brain attack neck w IV contrast and post procedure    Result Date: 3/18/2024  Interpreted By:  Yasmany Smalls, STUDY: CT BRAIN ATTACK HEAD WO IV CONTRAST; CT ANGIO BRAIN ATTACK HEAD W IV CONTRAST AND POST PROCEDURE; CT ANGIO BRAIN ATTACK NECK W IV CONTRAST AND POST PROCEDURE;  3/18/2024 3:00 am; 3/18/2024 3:08 am   INDICATION: Signs/Symptoms:Stroke vs; Signs/Symptoms:facial drooping   COMPARISON: Noncontrast head CT 02/13/2024   ACCESSION NUMBER(S): [id number]; [id number]; [id number]   ORDERING CLINICIAN: FARZANA MIX   TECHNIQUE: Multiple contiguous axial noncontrast images of the head were obtained. Following IV contrast administration of  mL  Omnipaque 350, a CT angiography of the head and neck was performed. MIPS and 3D reconstructions of the [place] of [place] and neck were created on an independent workstation and reviewed.   FINDINGS: NON-CONTRAST HEAD CT:   BRAIN PARENCHYMA:  No evidence of acute intraparenchymal hemorrhage or parenchymal evidence of an acute large territory ischemic infarct. No mass-effect, midline shift or effacement of cerebral sulci. Gray-white matter distinction is preserved.   VENTRICLES and EXTRA-AXIAL SPACES:  No acute extra-axial or intraventricular hemorrhage. Ventricles and sulci are age-concordant.   PARANASAL SINUSES/MASTOIDS:  No hemorrhage or air-fluid levels within the visualized paranasal sinuses. The mastoids are well aerated.   CALVARIUM/ORBITS:  No skull fracture.  The orbits and globes are intact to the extent visualized.   EXTRACRANIAL SOFT TISSUES: No discernible abnormality.       CTA NECK:       LEFT VERTEBRAL ARTERY:  No hemodynamically significant stenosis, occlusion, or dissection.   LEFT COMMON/INTERNAL CAROTID ARTERY:  No hemodynamically significant stenosis, occlusion, or dissection.   RIGHT VERTEBRAL ARTERY: Non dominant. No hemodynamically significant stenosis, occlusion, or dissection.   RIGHT COMMON/INTERNAL CAROTID ARTERY: There is a focal saccular outpouching along the lateral wall of the right ICA measuring 0.3 cm x 0.2 cm consistent with a pseudoaneurysm (coronal MIP image 60, series 408). This is located 0.7 cm from the skull base. There is no hemodynamically significant stenosis or atherosclerosis of the extracranial right ICA or right CCA.     The neck soft tissues show no evidence of mass, fluid collection, or enlarged lymph nodes. There is no acute osseous abnormality.       CTA HEAD:   ANTERIOR CIRCULATION: No aneurysm.   - Internal Carotid Arteries:  No hemodynamically significant stenosis or occlusion.   - Middle Cerebral Arteries:  No hemodynamically significant stenosis or occlusion.    - Anterior Cerebral Arteries:  No hemodynamically significant stenosis or occlusion.     POSTERIOR CIRCULATION: No aneurysm.   - Intracranial Vertebral Arteries:  No hemodynamically significant stenosis or occlusion.   - Basilar Artery: Diminutive in caliber owing to fetal origin of bilateral posterior cerebral arteries. No hemodynamically significant stenosis or occlusion.   - Posterior Cerebral Arteries: There is fetal origin of the bilateral posterior cerebral arteries. No hemodynamically significant stenosis or occlusion.   No arteriovenous malformation is visualized. No pathologic intracranial enhancement or discrete mass. The dural venous sinuses are patent.   MIPS and 3D reconstructions confirm the above findings.       1. No evidence of an acute ischemic infarct, acute intracranial hemorrhage, or mass effect.   2. 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 No associated dissection flap, mural thickening, or significant stenosis.   3. The remainder of the CTA head and neck is unremarkable.     MACRO: Yasmany Smalls discussed the significance and urgency of this critical [facility]  FARZANA MIX on 3/18/2024 at 3:19 am with confirmation of receipt .  (**-RCF-**) Findings:  See findings.   Signed by: Yasmany Smalls 3/18/2024 3:19 AM Dictation workstation:   [id number]    CT brain attack head wo IV contrast    Result Date: 3/18/2024  Interpreted By:  Yasmany Smalls, STUDY: CT BRAIN ATTACK HEAD WO IV CONTRAST; CT ANGIO BRAIN ATTACK HEAD W IV CONTRAST AND POST PROCEDURE; CT ANGIO BRAIN ATTACK NECK W IV CONTRAST AND POST PROCEDURE;  3/18/2024 3:00 am; 3/18/2024 3:08 am   INDICATION: Signs/Symptoms:Stroke vs; Signs/Symptoms:facial drooping   COMPARISON: Noncontrast head CT 02/13/2024   ACCESSION NUMBER(S): [id number]; [id number]; [id number]   ORDERING CLINICIAN: FARZANA MIX   TECHNIQUE: Multiple contiguous axial  noncontrast images of the head were obtained. Following IV contrast administration of  mL Omnipaque 350, a CT angiography of the head and neck was performed. MIPS and 3D reconstructions of the [place] and neck were created on an independent workstation and reviewed.   FINDINGS: NON-CONTRAST HEAD CT:   BRAIN PARENCHYMA:  No evidence of acute intraparenchymal hemorrhage or parenchymal evidence of an acute large territory ischemic infarct. No mass-effect, midline shift or effacement of cerebral sulci. Gray-white matter distinction is preserved.   VENTRICLES and EXTRA-AXIAL SPACES:  No acute extra-axial or intraventricular hemorrhage. Ventricles and sulci are age-concordant.   PARANASAL SINUSES/MASTOIDS:  No hemorrhage or air-fluid levels within the visualized paranasal sinuses. The mastoids are well aerated.   CALVARIUM/ORBITS:  No skull fracture.  The orbits and globes are intact to the extent visualized.   EXTRACRANIAL SOFT TISSUES: No discernible abnormality.       CTA NECK:       LEFT VERTEBRAL ARTERY:  No hemodynamically significant stenosis, occlusion, or dissection.   LEFT COMMON/INTERNAL CAROTID ARTERY:  No hemodynamically significant stenosis, occlusion, or dissection.   RIGHT VERTEBRAL ARTERY: Non dominant. No hemodynamically significant stenosis, occlusion, or dissection.   RIGHT COMMON/INTERNAL CAROTID ARTERY: There is a focal saccular outpouching along the lateral wall of the right ICA measuring 0.3 cm x 0.2 cm consistent with a pseudoaneurysm (coronal MIP image 60, series 408). This is located 0.7 cm from the skull base. There is no hemodynamically significant stenosis or atherosclerosis of the extracranial right ICA or right CCA.     The neck soft tissues show no evidence of mass, fluid collection, or enlarged lymph nodes. There is no acute osseous abnormality.       CTA HEAD:   ANTERIOR CIRCULATION: No aneurysm.   - Internal Carotid Arteries:  No hemodynamically significant stenosis or  occlusion.   - Middle Cerebral Arteries:  No hemodynamically significant stenosis or occlusion.   - Anterior Cerebral Arteries:  No hemodynamically significant stenosis or occlusion.     POSTERIOR CIRCULATION: No aneurysm.   - Intracranial Vertebral Arteries:  No hemodynamically significant stenosis or occlusion.   - Basilar Artery: Diminutive in caliber owing to fetal origin of bilateral posterior cerebral arteries. No hemodynamically significant stenosis or occlusion.   - Posterior Cerebral Arteries: There is fetal origin of the bilateral posterior cerebral arteries. No hemodynamically significant stenosis or occlusion.   No arteriovenous malformation is visualized. No pathologic intracranial enhancement or discrete mass. The dural venous sinuses are patent.   MIPS and 3D reconstructions confirm the above findings.       1. No evidence of an acute ischemic infarct, acute intracranial hemorrhage, or mass effect.   2. 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 No associated dissection flap, mural thickening, or significant stenosis.   3. The remainder of the CTA head and neck is unremarkable.     MACRO: Yasmany Smalls discussed the significance and urgency of this critical finding by [facility]  FARZANA MIX on 3/18/2024 at 3:19 am with confirmation of receipt .  (**-RCF-**) Findings:  See findings.   Signed by: Yasmany Smalls 3/18/2024 3:19 AM Dictation workstation:   [id number]    XR chest 1 view    Result Date: 3/18/2024  Interpreted By:  Madelaine Walker, STUDY: XR CHEST 1 VIEW;  3/18/2024 2:17 am   INDICATION: Signs/Symptoms:CP   COMPARISON: Chest x-ray 06/24/2014   ACCESSION NUMBER(S): [id number]   ORDERING CLINICIAN: FARZANA MIX   TECHNIQUE: 2 portable frontal views of the chest were obtained.   FINDINGS: The cardiomediastinal silhouette is within normal limits.   No focal consolidation, pleural effusion or pneumothorax.          1.  No radiographic evidence of acute cardiopulmonary process.       MACRO: None.   Signed by: Madelaine Walker 3/18/2024 2:51 AM Dictation workstation:   [id number]      Transthoracic Echo (TTE) Complete    Result Date: 3/18/2024          [place] [id number]  Tel [phone] Fax [phone] TRANSTHORACIC ECHOCARDIOGRAM REPORT  Patient Name:      TICO Herrera Physician:    [id number] Dustin Ramesh DO Study Date:        3/18/2024            Ordering Provider:    [id number] DOROTHY OLIVIA MRN/PID:           [id number]             Fellow: Accession#:        [id number]         Nurse:                Philip Quiñonez RN Date of Birth/Age: 1980 / 43 years Sonographer:          Shyann Kang RDCS Gender:            M                    Additional Staff: Height:            175.26 cm            Admit Date:           3/18/2024 Weight:            99.79 kg             Admission Status:     Inpatient -                                                               Routine BSA / BMI:         2.15 m2 / 32.49      Department Location:  [place] [address]                                      Echo Lab Blood Pressure: 149 /84 mmHg Study Type:    TRANSTHORACIC ECHO (TTE) COMPLETE Diagnosis/ICD: Transient cerebral ischemic attack, unspecified (NOT on                LCD)-G45.9 Indication:    Transischemic Attack CPT Codes:     Echo Complete w Full Doppler-[id number] Patient History: Pertinent History: Dyspnea, TIA and Polysubstance abuse. Study Detail: The following Echo studies were performed: 2D, M-Mode, Doppler and               color flow. Agitated saline used as a contrast agent for               intraseptal flow evaluation. The patient was awake.  PHYSICIAN INTERPRETATION: Left  Ventricle: Left ventricular systolic function is normal, with an estimated ejection fraction of 60%. There are no regional wall motion abnormalities. The left ventricular cavity size is normal. Spectral Doppler shows a normal pattern of left ventricular diastolic filling. LV Wall Scoring: All segments are normal. Left Atrium: The left atrium is mildly dilated. A bubble study using agitated saline was performed. Bubble study is positive. A small PFO (= 10 bubbles) was demonstrated. A bubble study shows that an intrapulmonary shunting is not present. Right Ventricle: The right ventricle is normal in size. There is normal right ventricular global systolic function. Right Atrium: The right atrium is normal in size. Aortic Valve: The aortic valve appears structurally normal. The aortic valve appears tricuspid. There is no evidence of aortic valve stenosis. There is no evidence of aortic valve regurgitation. The peak instantaneous gradient of the aortic valve is 7.8 mmHg. The mean gradient of the aortic valve is 4.0 mmHg. Mitral Valve: The mitral valve is normal in structure. There is no evidence of mitral valve stenosis. There is normal mitral valve leaflet mobility. There is no evidence of mitral valve regurgitation. Tricuspid Valve: The tricuspid valve is structurally normal. There is normal tricuspid valve leaflet mobility. There is trace tricuspid regurgitation. Pulmonic Valve: The pulmonic valve is structurally normal. There is no indication of pulmonic valve regurgitation. Pericardium: There is no pericardial effusion noted. Aorta: The aortic root is normal. Pulmonary Artery: The main pulmonary artery is normal in size, and position, with normal bifurcation into the left and right pulmonary arteries. Systemic Veins: The inferior vena cava appears to be of normal size. In comparison to the previous echocardiogram(s): Prior examinations are available and were reviewed for comparison purposes. The left ventricular  function is unchanged. The left ventricular diastolic function is normal.  CONCLUSIONS:  1. Left ventricular systolic function is normal with a 60% estimated ejection fraction.  2. There is no evidence of mitral valve stenosis.  3. No evidence of mitral valve regurgitation.  4. Trace tricuspid regurgitation is visualized.  5. Aortic valve stenosis is not present.  6. The main pulmonary artery is normal in size, and position, with normal bifurcation into the left and right pulmonary arteries.  7. A bubble study using agitated saline was performed. Bubble study is positive. A small PFO (= 10 bubbles) was demonstrated. QUANTITATIVE DATA SUMMARY: 2D MEASUREMENTS:                          Normal Ranges: Ao Root d:     3.10 cm   (2.0-3.7cm) LAs:           4.00 cm   (2.7-4.0cm) IVSd:          1.03 cm   (0.6-1.1cm) LVPWd:         0.98 cm   (0.6-1.1cm) LVIDd:         4.48 cm   (3.9-5.9cm) LVIDs:         2.98 cm LV Mass Index: 71.3 g/m2 LV % FS        33.5 % LA VOLUME:                               Normal Ranges: LA Vol A4C:        45.8 ml    (22+/-6mL/m2) LA Vol A2C:        40.9 ml LA Vol BP:         43.6 ml LA Vol Index A4C:  21.3ml/m2 LA Vol Index A2C:  19.0 ml/m2 LA Vol Index BP:   20.3 ml/m2 LA Area A4C:       17.3 cm2 LA Area A2C:       16.2 cm2 LA Major Axis A4C: 5.6 cm LA Major Axis A2C: 5.5 cm LA Volume Index:   19.2 ml/m2 RA VOLUME BY A/L METHOD:                               Normal Ranges: RA Vol A4C:        34.1 ml    (8.3-19.5ml) RA Vol Index A4C:  15.8 ml/m2 RA Area A4C:       15.1 cm2 RA Major Axis A4C: 5.7 cm AORTA MEASUREMENTS:                    Normal Ranges: Asc Ao, d: 3.00 cm (2.1-3.4cm) LV SYSTOLIC FUNCTION BY 2D PLANIMETRY (MOD):                     Normal Ranges: EF-A4C View: 56.8 % (>=55%) EF-A2C View: 60.2 % EF-Biplane:  55.9 % LV DIASTOLIC FUNCTION:                        Normal Ranges: MV Peak E:    0.93 m/s (0.7-1.2 m/s) MV Peak A:    0.64 m/s (0.42-0.7 m/s) E/A Ratio:    1.45     (1.0-2.2) MV e'          0.11 m/s (>8.0) MV lateral e' 0.11 m/s MV medial e'  0.11 m/s E/e' Ratio:   8.51     (<8.0) MITRAL VALVE:                 Normal Ranges: MV DT: 181 msec (150-240msec) AORTIC VALVE:                                   Normal Ranges: AoV Vmax:                1.40 m/s (<=1.7m/s) AoV Peak P.8 mmHg (<20mmHg) AoV Mean P.0 mmHg (1.7-11.5mmHg) LVOT Max Patricio:            1.12 m/s (<=1.1m/s) AoV VTI:                 30.70 cm (18-25cm) LVOT VTI:                23.10 cm LVOT Diameter:           2.10 cm  (1.8-2.4cm) AoV Area, VTI:           2.61 cm2 (2.5-5.5cm2) AoV Area,Vmax:           2.77 cm2 (2.5-4.5cm2) AoV Dimensionless Index: 0.75  RIGHT VENTRICLE: RV Basal 3.50 cm RV Mid   2.47 cm RV Major 7.4 cm TAPSE:   18.7 mm RV s'    0.14 m/s TRICUSPID VALVE/RVSP:                   Normal Ranges: IVC Diam: 1.75 cm PULMONIC VALVE:                         Normal Ranges: PV Accel Time: 185 msec (>120ms) PV Max Patricio:    0.8 m/s  (0.6-0.9m/s) PV Max P.7 mmHg  Kajal Ramesh DO Electronically signed on 3/18/2024 at 7:55:30 PM  Wall Scoring  ** Final **      Radiology:     Lower extremity venous duplex bilateral   Final Result   No sonographic evidence for deep vein thrombosis within the evaluated   veins of the bilateral lower extremities.        MACRO:   None        Signed by: Cosme Person 3/20/2024 6:11 AM   Dictation workstation:   [id number]      MR brain wo IV contrast   Final Result   Within limitations of some motion, unremarkable MRI of the brain. No   diffusion restriction abnormality is present to suggest an acute   infarct        MACRO:   None        Signed by: Delfin Warren 3/18/2024 9:32 PM   Dictation workstation:   [id number]      CT head wo IV contrast   Final Result   1.  No acute intracranial hemorrhage or acute territorial infarct. If   there is persistent clinical concern for acute ischemia, MRI can be   obtained for further evaluation.                        MACRO:   None.        Signed by: Madelaine Walker 3/18/2024 8:09 PM   Dictation workstation:   [id number]      Transthoracic Echo (TTE) Complete   Final Result      CT angio brain attack head w IV contrast and post procedure   Final Result   1. No evidence of an acute ischemic infarct, acute intracranial   hemorrhage, or mass effect.        2. 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 No associated   dissection flap, mural thickening, or significant stenosis.        3. The remainder of the CTA head and neck is unremarkable.             MACRO:   Yasmany Smalls discussed the significance and urgency of this   critical finding by NIKKIE ANGEL with  FARZANA MIX on 3/18/2024 at   3:19 am with confirmation of receipt .  (**-RCF-**) Findings:  See   findings.        Signed by: Yasmany Smalls 3/18/2024 3:19 AM   Dictation workstation:   [id number]      CT angio brain attack neck w IV contrast and post procedure   Final Result   1. No evidence of an acute ischemic infarct, acute intracranial   hemorrhage, or mass effect.        2. 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 No associated   dissection flap, mural thickening, or significant stenosis.        3. The remainder of the CTA head and neck is unremarkable.             MACRO:   Yasmany Smalls discussed the significance and urgency of this   critical finding by NIKKIE ANGEL with  FARZANA MIX on 3/18/2024 at   3:19 am with confirmation of receipt .  (**-RCF-**) Findings:  See   findings.        Signed by: Yasmany Smalls 3/18/2024 3:19 AM   Dictation workstation:   [id number]      CT brain attack head wo IV contrast   Final Result   1. No evidence of an acute ischemic infarct, acute intracranial   hemorrhage, or mass effect.        2. 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 No associated   dissection flap, mural thickening, or significant stenosis.        3. The remainder of the CTA head and neck is unremarkable.             MACRO:   Yasmany Smalls discussed the significance and urgency of this   critical finding by NIKKIE ANGEL with  FARZANA MIX on 3/18/2024 at   3:19 am with confirmation of receipt .  (**-RCF-**) Findings:  See   findings.        Signed by: Yasmany Smalls 3/18/2024 3:19 AM   Dictation workstation:   [id number]      XR chest 1 view   Final Result   1.  No radiographic evidence of acute cardiopulmonary process.                  MACRO:   None.        Signed by: Madelaine Walker 3/18/2024 2:51 AM   Dictation workstation:   [id number]          Problem List:     Patient Active Problem List   Diagnosis    TIA (transient ischemic attack)    Anxiety and depression    Tobacco use disorder    OD (overdose of drug), intentional self-harm, sequela (CMS/[facility])    Drug overdose, intentional self-harm, initial encounter (CMS/[facility])    Stroke-like symptoms    Left-sided weakness    Dysarthria       Assessment:   Acute CVA with left-sided hemiparesis, facial droop  Substance abuse  Anxiety/depression  Small PFO  EF 60%      Plan:   Tele monitoring  2 d echo done  EKG now please  Neuro input thank you  Spoke with patient at length regarding no more drug use  DAYANARA today well informed of risk versus benefits verbalized understanding would like to proceed with procedure    Further recommendations per Dr michael Perez CNP  [facility]      Of note, this documentation is completed using the Dragon Dictation system (voice recognition software). There may be spelling and/or grammatical errors that were not corrected prior to final submission.      Electronically signed by Johnson Perez, APRN-CNP, on 3/20/2024 at 8:47 AM     Michael Note  I have personally reviewed and edited note to reflect  my history, physical, review of testing, assessment, plan.  Review of systems are negative, noncontributory, as previous mentioned x 12 systems.  Exam: Heart-regular, lungs-clear to auscultation  I personally reviewed EKG and chest x-ray:  March 18 EKG: Sinus tachycardia 122 bpm  March 18 chest x-ray: No acute cardiopulmonary disease    Assessment:  Stroke like symptoms/left hemiparesis/facial droop  Previous TIA  Patent foramen ovale  Cocaine abuse  Tobacco abuse  Medical noncompliance  Hypertension    Recommendations:  Transesophageal echocardiogram-risk and benefit discussed with patient willing to proceed.  This is if not the patient's first neurologic event.  He was seen by neurology for TIA and was advised aspirin and statin.  He has not been compliant with medical therapy.  He now has symptoms that are clinically consistent with stroke however no specific findings on brain MRI or CT.  Not clear if there is an embolic etiology to presentation.  Onset of symptoms occurred after cocaine use.  At this time it appears that neurology is advising aspirin and statin.  Consider Plavix? Consider anticoagulation?    MLanger

## 2024-03-20 NOTE — H&P (VIEW-ONLY)
Inpatient consult to Cardiology  Consult performed by: ARELI Peacock-CNP  Consult ordered by: Vinicio Huffman MD  Reason for consult: PFO      Cardiology Consult Note      Date:   3/20/2024  Patient name:  Jean-Pierre tSubbs  Date of admission:  3/18/2024  1:13 AM  MRN:   [id number]  YOB: 1980  Time of Consult:  8:47 AM    Consulting Cardiologist: ARELI Sanders, CNP  Primary Cardiologist:   None     Referring Provider: Dr Huffman      Admission Diagnosis:     Stroke-like symptoms      History of Present Illness:      Jean-Pierre Stubbs is a 43 y.o.  male patient who is being at the request of Dr. Huffman for inpatient consultation of  CVA possible PFO . He was admitted on 3/18/2024.  Previous [facility] and [facility] records have been reviewed in detail.   Patient with a history of hypertension, anxiety, substance abuse came into the emergency department on Sunday was brought in by his significant other.  He states that he was smoking weed then he started smoking cocaine he developed this weird sensation on his left side when she was driving a man he developed slurred speech with facial droop on the left side with left leg weakness and left arm weakness.  Seen and evaluated by neurology.  They did do an echo that showed a small PFO they asked cardiology to get involved with his case.  He still does have some slurred speech.  3 out of 5 hand grasp and foot pumps on the left side 5 out of 5 hand grasp and foot pumps on the right side he does have a left-sided facial droop.  He denies having any chest pain, fever, chills, nausea, vomiting, PND, orthopnea, claudication  EKG done on 3/18/2024 sinus tachycardia  Telemetry ordered    Cardiac history  None        Allergies:     Allergies   Allergen Reactions    Tramadol Other     Tiredness         Past Medical History:     Past Medical History:   Diagnosis Date    Anxiety     Asthma     Chronic back pain     Depression      Diverticulitis     Fatty liver     IBS (irritable bowel syndrome)     TIA (transient ischemic attack)        Past Surgical History:     No past surgical history on file.    Family History:     No family history on file.    Social History:     Social History     Tobacco Use    Smoking status: Every Day     Types: Cigarettes    Smokeless tobacco: Current       CURRENT INPATIENT MEDICATIONS    amLODIPine, 2.5 mg, oral, Daily  aspirin, 81 mg, oral, Daily  atorvastatin, 80 mg, oral, Nightly  enoxaparin, 40 mg, subcutaneous, Daily  polyethylene glycol, 17 g, oral, Daily         Current Outpatient Medications   Medication Instructions    amLODIPine (NORVASC) 5 mg, oral, Daily        Review of Systems:      12 point review of systems was obtained in detail and is negative other than that detailed above.    Vital Signs:     Vitals:    03/19/24 2000 03/20/24 0000 03/20/24 0400 03/20/24 0743   BP: 131/67 133/72 112/64 110/64   BP Location: Left arm  Left arm Right arm   Patient Position: Lying  Lying Lying   Pulse: 78 92 68 56   Resp: 16 20 16 18   Temp: 37.1 °C (98.8 °F) 36.8 °C (98.2 °F) 36.9 °C (98.4 °F) 36.4 °C (97.5 °F)   TempSrc: Temporal Temporal Temporal Temporal   SpO2: 98% 97% 96% 95%   Weight:       Height:         No intake or output data in the 24 hours ending 03/20/24 0847    Wt Readings from Last 4 Encounters:   03/18/24 99.8 kg (220 lb)   02/13/24 95.3 kg (210 lb)   12/06/23 95 kg (209 lb 7 oz)       Physical Examination:     GENERAL APPEARANCE: Well developed, well nourished, in no acute distress.  Left-sided facial droop  CHEST: Symmetric and non-tender.  INTEGUMENT: Skin warm and dry, without gross excoriationis or lesions.  HEENT: No gross abnormalities of conjunctiva, teeth, gums, oral mucosa left-sided facial droop  NECK: Supple, no JVD, no bruit. Thyroid not palpable. Carotid upstrokes normal.  NEURO/PSHCY: Alert and oriented x3; left-sided weakness and garbled speech  LUNGS: Clear to auscultation  bilaterally; normal respiratory effort.  HEART: Rate and rhythm regular with no evident murmur; no gallop appreciated. There are no rubs, clicks or heaves. PMI nondisplaced.  ABDOMEN: Soft, nontender, no palpable hepatosplenomegaly, no mases, no bruits. Abdominal aorta not noted to be enlarged.  MUSCULOSKELETAL: Ambulatory with normal tandem gait.  EXTREMITIES: Warm with good color, no clubbing or cyanois. There is no edema noted.  PERIPHERAL VASCULAR: 2+ peripheral pulses      Lab:     CBC:   Results from last 7 days   Lab Units 03/18/24  0214   WBC AUTO x10*3/uL 7.8   RBC AUTO x10*6/uL 5.94*   HEMOGLOBIN g/dL 15.5   HEMATOCRIT % 48.3   MCV fL 81   MCH pg 26.1   MCHC g/dL 32.1   RDW % 15.5*   PLATELETS AUTO x10*3/uL 229     CMP:    Results from last 7 days   Lab Units 03/18/24  0214   SODIUM mmol/L 138   POTASSIUM mmol/L 3.5   CHLORIDE mmol/L 101   CO2 mmol/L 26   BUN mg/dL 11   CREATININE mg/dL 0.95   GLUCOSE mg/dL 174*   PROTEIN TOTAL g/dL 7.6   CALCIUM mg/dL 9.8   BILIRUBIN TOTAL mg/dL 0.4   ALK PHOS U/L 60   AST U/L 15   ALT U/L 18     BMP:    Results from last 7 days   Lab Units 03/18/24  0214   SODIUM mmol/L 138   POTASSIUM mmol/L 3.5   CHLORIDE mmol/L 101   CO2 mmol/L 26   BUN mg/dL 11   CREATININE mg/dL 0.95   CALCIUM mg/dL 9.8   GLUCOSE mg/dL 174*     Magnesium:  Results from last 7 days   Lab Units 03/18/24  0214   MAGNESIUM mg/dL 1.99     Troponin:    Results from last 7 days   Lab Units 03/18/24  0333 03/18/24  0214   TROPHS ng/L 3 <3     BNP:     Lipid Panel:  Results from last 7 days   Lab Units 03/18/24  0638   HDL mg/dL 31.2   CHOLESTEROL/HDL RATIO  3.0   VLDL mg/dL 21   TRIGLYCERIDES mg/dL 105   NON HDL CHOL. mg/dL 63        Diagnostic Studies:   @No results found for this or any previous visit.    Lower extremity venous duplex bilateral    Result Date: 3/20/2024  Interpreted By:  Cosme Person, STUDY: [facility] LOWER EXTREMITY VENOUS DUPLEX BILATERAL;  3/19/2024 7:36 pm   INDICATION:  Signs/Symptoms:Rule out DVT, by a history of stroke and PFO..   COMPARISON: None.   ACCESSION NUMBER(S): [id number]   ORDERING CLINICIAN: JOHNSON HAYNES   TECHNIQUE: Vascular ultrasound of the bilateral lower extremities was performed. Real-time compression views as well as Gray scale, color Doppler and spectral Doppler waveform analysis was performed.   FINDINGS: Evaluation of the visualized portions of the bilateral common femoral, proximal, mid, and distal femoral, and popliteal veins was performed.  Evaluation of the visualized portions of the posterior tibial and peroneal veins was also performed.   Limitations: None   The evaluated veins demonstrate normal compressibility. There is intact venous flow demonstrating normal respiratory variability and normal augmentation of flow with calf compression.         No sonographic evidence for deep vein thrombosis within the evaluated veins of the bilateral lower extremities.   MACRO: None   Signed by: Cosme Person 3/20/2024 6:11 AM Dictation workstation:   [id number]    ECG 12 lead    Result Date: 3/19/2024  Accelerated Junctional rhythm Abnormal ECG When compared with ECG of 13-FEB-2024 21:43, Junctional rhythm has replaced Sinus rhythm Vent. rate has increased BY  71 BPM QRS duration has decreased ST no longer elevated in Lateral leads Nonspecific T wave abnormality now evident in Anterior leads See ED provider note for full interpretation and clinical correlation Confirmed by Kiersten Green ([id number]) on 3/19/2024 11:53:48 AM    MR brain wo IV contrast    Result Date: 3/18/2024  Interpreted By:  Delfin Warren, STUDY: MR BRAIN WO IV CONTRAST;  3/18/2024 9:20 pm   INDICATION: Signs/Symptoms:Stroke like symptoms.   COMPARISON: Same day noncontrast CT head; MRI of the brain dated 12/06/2023.   ACCESSION NUMBER(S): [id number]   ORDERING CLINICIAN: AMEENA VELASCO   TECHNIQUE: Axial T2, FLAIR, DWI, gradient echo T2 and sagittal and coronal T1 weighted images of brain  were acquired.   FINDINGS: Exam is degraded by some motion.   Within limitations of the exam no diffusion restriction abnormality is identified to suggest an acute infarct.   There is no definite evidence of intracranial hemorrhage. No mass effect or midline shift is present. No pathologic hemosiderin staining is appreciated.   No ventricular dilatation is evident. Basal cisterns are patent. No extra-axial fluid collections are identified.   No discrete parenchymal signal abnormality is identified. Visualized large arterial flow voids, including intracranial carotid and basilar artery appear preserved.   No abnormal fluid signal is present in the paranasal sinuses or mastoid air cells bilaterally.       Within limitations of some motion, unremarkable MRI of the brain. No diffusion restriction abnormality is present to suggest an acute infarct   MACRO: None   Signed by: Delfin Warren 3/18/2024 9:32 PM Dictation workstation:   [id number]    CT head wo IV contrast    Result Date: 3/18/2024  Interpreted By:  Madelaine Walker, STUDY: CT HEAD WO IV CONTRAST;  3/18/2024 7:13 pm   INDICATION: Signs/Symptoms:L sided weakness, L facial droop, dysarthria, concerning for acute stroke, had worsening speech, L sided weakness, vomiting.   COMPARISON: CT head earlier same date 03/18/2024.   ACCESSION NUMBER(S): [id number]   ORDERING CLINICIAN: AMEENA VELASCO   TECHNIQUE: Noncontrast CT axial images were obtained through the brain.  Coronal and sagittal reformats were performed.   FINDINGS: The ventricles, sulci and basal cisterns are within normal limits. The grey-white matter differentiation is intact. No acute territorial infarct.  There is no mass effect or midline shift. There is no extraaxial fluid collection.  There is no intracranial hemorrhage. No depressed calvarial fracture. No air-fluid levels at the visualized paranasal sinuses. The mastoid air cells are clear.       1.  No acute intracranial hemorrhage or  acute territorial infarct. If there is persistent clinical concern for acute ischemia, MRI can be obtained for further evaluation.         MACRO: None.   Signed by: Madelaine Walker 3/18/2024 8:09 PM Dictation workstation:   [id number]    Transthoracic Echo (TTE) Complete    Result Date: 3/18/2024          [place] [address]  Tel [phone] Fax [phone] TRANSTHORACIC ECHOCARDIOGRAM REPORT  Patient Name:      TICO Herrera Physician:    [id number] Dustin Ramesh DO Study Date:        3/18/2024            Ordering Provider:    [id number] DOROTHY OLIVIA MRN/PID:           [id number]             Fellow: Accession#:        [id number]         Nurse:                Philip Quiñonez RN Date of Birth/Age: 1980 / 43 years Sonographer:          Shyann Kang RDCS Gender:            M                    Additional Staff: Height:            175.26 cm            Admit Date:           3/18/2024 Weight:            99.79 kg             Admission Status:     Inpatient -                                                               Routine BSA / BMI:         2.15 m2 / 32.49      Department Location:  [place] [address]                                      Echo Lab Blood Pressure: 149 /84 mmHg Study Type:    TRANSTHORACIC ECHO (TTE) COMPLETE Diagnosis/ICD: Transient cerebral ischemic attack, unspecified (NOT on                LCD)-G45.9 Indication:    Transischemic Attack CPT Codes:     Echo Complete w Full Doppler-[id number] Patient History: Pertinent History: Dyspnea, TIA and Polysubstance abuse. Study Detail: The following Echo studies were performed: 2D, M-Mode, Doppler and               color flow. Agitated saline used as a contrast agent for               intraseptal flow  evaluation. The patient was awake.  PHYSICIAN INTERPRETATION: Left Ventricle: Left ventricular systolic function is normal, with an estimated ejection fraction of 60%. There are no regional wall motion abnormalities. The left ventricular cavity size is normal. Spectral Doppler shows a normal pattern of left ventricular diastolic filling. LV Wall Scoring: All segments are normal. Left Atrium: The left atrium is mildly dilated. A bubble study using agitated saline was performed. Bubble study is positive. A small PFO (= 10 bubbles) was demonstrated. A bubble study shows that an intrapulmonary shunting is not present. Right Ventricle: The right ventricle is normal in size. There is normal right ventricular global systolic function. Right Atrium: The right atrium is normal in size. Aortic Valve: The aortic valve appears structurally normal. The aortic valve appears tricuspid. There is no evidence of aortic valve stenosis. There is no evidence of aortic valve regurgitation. The peak instantaneous gradient of the aortic valve is 7.8 mmHg. The mean gradient of the aortic valve is 4.0 mmHg. Mitral Valve: The mitral valve is normal in structure. There is no evidence of mitral valve stenosis. There is normal mitral valve leaflet mobility. There is no evidence of mitral valve regurgitation. Tricuspid Valve: The tricuspid valve is structurally normal. There is normal tricuspid valve leaflet mobility. There is trace tricuspid regurgitation. Pulmonic Valve: The pulmonic valve is structurally normal. There is no indication of pulmonic valve regurgitation. Pericardium: There is no pericardial effusion noted. Aorta: The aortic root is normal. Pulmonary Artery: The main pulmonary artery is normal in size, and position, with normal bifurcation into the left and right pulmonary arteries. Systemic Veins: The inferior vena cava appears to be of normal size. In comparison to the previous echocardiogram(s): Prior examinations are available  and were reviewed for comparison purposes. The left ventricular function is unchanged. The left ventricular diastolic function is normal.  CONCLUSIONS:  1. Left ventricular systolic function is normal with a 60% estimated ejection fraction.  2. There is no evidence of mitral valve stenosis.  3. No evidence of mitral valve regurgitation.  4. Trace tricuspid regurgitation is visualized.  5. Aortic valve stenosis is not present.  6. The main pulmonary artery is normal in size, and position, with normal bifurcation into the left and right pulmonary arteries.  7. A bubble study using agitated saline was performed. Bubble study is positive. A small PFO (= 10 bubbles) was demonstrated. QUANTITATIVE DATA SUMMARY: 2D MEASUREMENTS:                          Normal Ranges: Ao Root d:     3.10 cm   (2.0-3.7cm) LAs:           4.00 cm   (2.7-4.0cm) IVSd:          1.03 cm   (0.6-1.1cm) LVPWd:         0.98 cm   (0.6-1.1cm) LVIDd:         4.48 cm   (3.9-5.9cm) LVIDs:         2.98 cm LV Mass Index: 71.3 g/m2 LV % FS        33.5 % LA VOLUME:                               Normal Ranges: LA Vol A4C:        45.8 ml    (22+/-6mL/m2) LA Vol A2C:        40.9 ml LA Vol BP:         43.6 ml LA Vol Index A4C:  21.3ml/m2 LA Vol Index A2C:  19.0 ml/m2 LA Vol Index BP:   20.3 ml/m2 LA Area A4C:       17.3 cm2 LA Area A2C:       16.2 cm2 LA Major Axis A4C: 5.6 cm LA Major Axis A2C: 5.5 cm LA Volume Index:   19.2 ml/m2 RA VOLUME BY A/L METHOD:                               Normal Ranges: RA Vol A4C:        34.1 ml    (8.3-19.5ml) RA Vol Index A4C:  15.8 ml/m2 RA Area A4C:       15.1 cm2 RA Major Axis A4C: 5.7 cm AORTA MEASUREMENTS:                    Normal Ranges: Asc Ao, d: 3.00 cm (2.1-3.4cm) LV SYSTOLIC FUNCTION BY 2D PLANIMETRY (MOD):                     Normal Ranges: EF-A4C View: 56.8 % (>=55%) EF-A2C View: 60.2 % EF-Biplane:  55.9 % LV DIASTOLIC FUNCTION:                        Normal Ranges: MV Peak E:    0.93 m/s (0.7-1.2 m/s) MV Peak A:     0.64 m/s (0.42-0.7 m/s) E/A Ratio:    1.45     (1.0-2.2) MV e'         0.11 m/s (>8.0) MV lateral e' 0.11 m/s MV medial e'  0.11 m/s E/e' Ratio:   8.51     (<8.0) MITRAL VALVE:                 Normal Ranges: MV DT: 181 msec (150-240msec) AORTIC VALVE:                                   Normal Ranges: AoV Vmax:                1.40 m/s (<=1.7m/s) AoV Peak P.8 mmHg (<20mmHg) AoV Mean P.0 mmHg (1.7-11.5mmHg) LVOT Max Patricio:            1.12 m/s (<=1.1m/s) AoV VTI:                 30.70 cm (18-25cm) LVOT VTI:                23.10 cm LVOT Diameter:           2.10 cm  (1.8-2.4cm) AoV Area, VTI:           2.61 cm2 (2.5-5.5cm2) AoV Area,Vmax:           2.77 cm2 (2.5-4.5cm2) AoV Dimensionless Index: 0.75  RIGHT VENTRICLE: RV Basal 3.50 cm RV Mid   2.47 cm RV Major 7.4 cm TAPSE:   18.7 mm RV s'    0.14 m/s TRICUSPID VALVE/RVSP:                   Normal Ranges: IVC Diam: 1.75 cm PULMONIC VALVE:                         Normal Ranges: PV Accel Time: 185 msec (>120ms) PV Max Patricio:    0.8 m/s  (0.6-0.9m/s) PV Max P.7 mmHg  [id number] Dustin Ramesh DO Electronically signed on 3/18/2024 at 7:55:30 PM  Wall Scoring  ** Final **     CT angio brain attack head w IV contrast and post procedure    Result Date: 3/18/2024  Interpreted By:  Yasmany Smalls, STUDY: CT BRAIN ATTACK HEAD WO IV CONTRAST; CT ANGIO BRAIN ATTACK HEAD W IV CONTRAST AND POST PROCEDURE; CT ANGIO BRAIN ATTACK NECK W IV CONTRAST AND POST PROCEDURE;  3/18/2024 3:00 am; 3/18/2024 3:08 am   INDICATION: Signs/Symptoms:Stroke vs; Signs/Symptoms:facial drooping   COMPARISON: Noncontrast head CT 2024   ACCESSION NUMBER(S): [id number]; [id number]; [id number]   ORDERING CLINICIAN: FARZANA MIX   TECHNIQUE: Multiple contiguous axial noncontrast images of the head were obtained. Following IV contrast administration of  mL Omnipaque 350, a CT angiography of the head and neck was performed. MIPS and 3D reconstructions of the [place] of  [place] and neck were created on an independent workstation and reviewed.   FINDINGS: NON-CONTRAST HEAD CT:   BRAIN PARENCHYMA:  No evidence of acute intraparenchymal hemorrhage or parenchymal evidence of an acute large territory ischemic infarct. No mass-effect, midline shift or effacement of cerebral sulci. Gray-white matter distinction is preserved.   VENTRICLES and EXTRA-AXIAL SPACES:  No acute extra-axial or intraventricular hemorrhage. Ventricles and sulci are age-concordant.   PARANASAL SINUSES/MASTOIDS:  No hemorrhage or air-fluid levels within the visualized paranasal sinuses. The mastoids are well aerated.   CALVARIUM/ORBITS:  No skull fracture.  The orbits and globes are intact to the extent visualized.   EXTRACRANIAL SOFT TISSUES: No discernible abnormality.       CTA NECK:       LEFT VERTEBRAL ARTERY:  No hemodynamically significant stenosis, occlusion, or dissection.   LEFT COMMON/INTERNAL CAROTID ARTERY:  No hemodynamically significant stenosis, occlusion, or dissection.   RIGHT VERTEBRAL ARTERY: Non dominant. No hemodynamically significant stenosis, occlusion, or dissection.   RIGHT COMMON/INTERNAL CAROTID ARTERY: There is a focal saccular outpouching along the lateral wall of the right ICA measuring 0.3 cm x 0.2 cm consistent with a pseudoaneurysm (coronal MIP image 60, series 408). This is located 0.7 cm from the skull base. There is no hemodynamically significant stenosis or atherosclerosis of the extracranial right ICA or right CCA.     The neck soft tissues show no evidence of mass, fluid collection, or enlarged lymph nodes. There is no acute osseous abnormality.       CTA HEAD:   ANTERIOR CIRCULATION: No aneurysm.   - Internal Carotid Arteries:  No hemodynamically significant stenosis or occlusion.   - Middle Cerebral Arteries:  No hemodynamically significant stenosis or occlusion.   - Anterior Cerebral Arteries:  No hemodynamically significant stenosis or occlusion.     POSTERIOR CIRCULATION:  No aneurysm.   - Intracranial Vertebral Arteries:  No hemodynamically significant stenosis or occlusion.   - Basilar Artery: Diminutive in caliber owing to fetal origin of bilateral posterior cerebral arteries. No hemodynamically significant stenosis or occlusion.   - Posterior Cerebral Arteries: There is fetal origin of the bilateral posterior cerebral arteries. No hemodynamically significant stenosis or occlusion.   No arteriovenous malformation is visualized. No pathologic intracranial enhancement or discrete mass. The dural venous sinuses are patent.   MIPS and 3D reconstructions confirm the above findings.       1. No evidence of an acute ischemic infarct, acute intracranial hemorrhage, or mass effect.   2. 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 No associated dissection flap, mural thickening, or significant stenosis.   3. The remainder of the CTA head and neck is unremarkable.     MACRO: Yasmany Smalls discussed the significance and urgency of this critical finding by NIKKIE ANGEL with  FARZANA MIX on 3/18/2024 at 3:19 am with confirmation of receipt .  (**-RCF-**) Findings:  See findings.   Signed by: Yasmany Smalsl 3/18/2024 3:19 AM Dictation workstation:   [id number]    CT angio brain attack neck w IV contrast and post procedure    Result Date: 3/18/2024  Interpreted By:  Yasmany Smalls, STUDY: CT BRAIN ATTACK HEAD WO IV CONTRAST; CT ANGIO BRAIN ATTACK HEAD W IV CONTRAST AND POST PROCEDURE; CT ANGIO BRAIN ATTACK NECK W IV CONTRAST AND POST PROCEDURE;  3/18/2024 3:00 am; 3/18/2024 3:08 am   INDICATION: Signs/Symptoms:Stroke vs; Signs/Symptoms:facial drooping   COMPARISON: Noncontrast head CT 02/13/2024   ACCESSION NUMBER(S): [id number]; [id number]; [id number]   ORDERING CLINICIAN: FARZANA MIX   TECHNIQUE: Multiple contiguous axial noncontrast images of the head were obtained. Following IV contrast administration of  mL  Omnipaque 350, a CT angiography of the head and neck was performed. MIPS and 3D reconstructions of the [place] of [place] and neck were created on an independent workstation and reviewed.   FINDINGS: NON-CONTRAST HEAD CT:   BRAIN PARENCHYMA:  No evidence of acute intraparenchymal hemorrhage or parenchymal evidence of an acute large territory ischemic infarct. No mass-effect, midline shift or effacement of cerebral sulci. Gray-white matter distinction is preserved.   VENTRICLES and EXTRA-AXIAL SPACES:  No acute extra-axial or intraventricular hemorrhage. Ventricles and sulci are age-concordant.   PARANASAL SINUSES/MASTOIDS:  No hemorrhage or air-fluid levels within the visualized paranasal sinuses. The mastoids are well aerated.   CALVARIUM/ORBITS:  No skull fracture.  The orbits and globes are intact to the extent visualized.   EXTRACRANIAL SOFT TISSUES: No discernible abnormality.       CTA NECK:       LEFT VERTEBRAL ARTERY:  No hemodynamically significant stenosis, occlusion, or dissection.   LEFT COMMON/INTERNAL CAROTID ARTERY:  No hemodynamically significant stenosis, occlusion, or dissection.   RIGHT VERTEBRAL ARTERY: Non dominant. No hemodynamically significant stenosis, occlusion, or dissection.   RIGHT COMMON/INTERNAL CAROTID ARTERY: There is a focal saccular outpouching along the lateral wall of the right ICA measuring 0.3 cm x 0.2 cm consistent with a pseudoaneurysm (coronal MIP image 60, series 408). This is located 0.7 cm from the skull base. There is no hemodynamically significant stenosis or atherosclerosis of the extracranial right ICA or right CCA.     The neck soft tissues show no evidence of mass, fluid collection, or enlarged lymph nodes. There is no acute osseous abnormality.       CTA HEAD:   ANTERIOR CIRCULATION: No aneurysm.   - Internal Carotid Arteries:  No hemodynamically significant stenosis or occlusion.   - Middle Cerebral Arteries:  No hemodynamically significant stenosis or occlusion.    - Anterior Cerebral Arteries:  No hemodynamically significant stenosis or occlusion.     POSTERIOR CIRCULATION: No aneurysm.   - Intracranial Vertebral Arteries:  No hemodynamically significant stenosis or occlusion.   - Basilar Artery: Diminutive in caliber owing to fetal origin of bilateral posterior cerebral arteries. No hemodynamically significant stenosis or occlusion.   - Posterior Cerebral Arteries: There is fetal origin of the bilateral posterior cerebral arteries. No hemodynamically significant stenosis or occlusion.   No arteriovenous malformation is visualized. No pathologic intracranial enhancement or discrete mass. The dural venous sinuses are patent.   MIPS and 3D reconstructions confirm the above findings.       1. No evidence of an acute ischemic infarct, acute intracranial hemorrhage, or mass effect.   2. 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 No associated dissection flap, mural thickening, or significant stenosis.   3. The remainder of the CTA head and neck is unremarkable.     MACRO: Yasmany Smalls discussed the significance and urgency of this critical finding [facility]  FARZANA MIX on 3/18/2024 at 3:19 am with confirmation of receipt .  (**-RCF-**) Findings:  See findings.   Signed by: Yasmany Smalls 3/18/2024 3:19 AM Dictation workstation:   [id number]    CT brain attack head wo IV contrast    Result Date: 3/18/2024  Interpreted By:  Yasmany Smalls, STUDY: CT BRAIN ATTACK HEAD WO IV CONTRAST; CT ANGIO BRAIN ATTACK HEAD W IV CONTRAST AND POST PROCEDURE; CT ANGIO BRAIN ATTACK NECK W IV CONTRAST AND POST PROCEDURE;  3/18/2024 3:00 am; 3/18/2024 3:08 am   INDICATION: Signs/Symptoms:Stroke vs; Signs/Symptoms:facial drooping   COMPARISON: Noncontrast head CT 02/13/2024   ACCESSION NUMBER(S): [id number]; [id number]; [id number]   ORDERING CLINICIAN: FARZANA MIX   TECHNIQUE: Multiple contiguous axial  noncontrast images of the head were obtained. Following IV contrast administration of  mL Omnipaque 350, a CT angiography of the head and neck was performed. MIPS and 3D reconstructions of the [place] of [place] and neck were created on an independent workstation and reviewed.   FINDINGS: NON-CONTRAST HEAD CT:   BRAIN PARENCHYMA:  No evidence of acute intraparenchymal hemorrhage or parenchymal evidence of an acute large territory ischemic infarct. No mass-effect, midline shift or effacement of cerebral sulci. Gray-white matter distinction is preserved.   VENTRICLES and EXTRA-AXIAL SPACES:  No acute extra-axial or intraventricular hemorrhage. Ventricles and sulci are age-concordant.   PARANASAL SINUSES/MASTOIDS:  No hemorrhage or air-fluid levels within the visualized paranasal sinuses. The mastoids are well aerated.   CALVARIUM/ORBITS:  No skull fracture.  The orbits and globes are intact to the extent visualized.   EXTRACRANIAL SOFT TISSUES: No discernible abnormality.       CTA NECK:       LEFT VERTEBRAL ARTERY:  No hemodynamically significant stenosis, occlusion, or dissection.   LEFT COMMON/INTERNAL CAROTID ARTERY:  No hemodynamically significant stenosis, occlusion, or dissection.   RIGHT VERTEBRAL ARTERY: Non dominant. No hemodynamically significant stenosis, occlusion, or dissection.   RIGHT COMMON/INTERNAL CAROTID ARTERY: There is a focal saccular outpouching along the lateral wall of the right ICA measuring 0.3 cm x 0.2 cm consistent with a pseudoaneurysm (coronal MIP image 60, series 408). This is located 0.7 cm from the skull base. There is no hemodynamically significant stenosis or atherosclerosis of the extracranial right ICA or right CCA.     The neck soft tissues show no evidence of mass, fluid collection, or enlarged lymph nodes. There is no acute osseous abnormality.       CTA HEAD:   ANTERIOR CIRCULATION: No aneurysm.   - Internal Carotid Arteries:  No hemodynamically significant stenosis or  occlusion.   - Middle Cerebral Arteries:  No hemodynamically significant stenosis or occlusion.   - Anterior Cerebral Arteries:  No hemodynamically significant stenosis or occlusion.     POSTERIOR CIRCULATION: No aneurysm.   - Intracranial Vertebral Arteries:  No hemodynamically significant stenosis or occlusion.   - Basilar Artery: Diminutive in caliber owing to fetal origin of bilateral posterior cerebral arteries. No hemodynamically significant stenosis or occlusion.   - Posterior Cerebral Arteries: There is fetal origin of the bilateral posterior cerebral arteries. No hemodynamically significant stenosis or occlusion.   No arteriovenous malformation is visualized. No pathologic intracranial enhancement or discrete mass. The dural venous sinuses are patent.   MIPS and 3D reconstructions confirm the above findings.       1. No evidence of an acute ischemic infarct, acute intracranial hemorrhage, or mass effect.   2. 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 No associated dissection flap, mural thickening, or significant stenosis.   3. The remainder of the CTA head and neck is unremarkable.     MACRO: Yasmany Smalls discussed the significance and urgency of this critical finding by [facility]  FARZANA MIX on 3/18/2024 at 3:19 am with confirmation of receipt .  (**-RCF-**) Findings:  See findings.   Signed by: Yasmany Smalls 3/18/2024 3:19 AM Dictation workstation:   [id number]    XR chest 1 view    Result Date: 3/18/2024  Interpreted By:  Madelaine Walker, STUDY: XR CHEST 1 VIEW;  3/18/2024 2:17 am   INDICATION: Signs/Symptoms:CP   COMPARISON: Chest x-ray 06/24/2014   ACCESSION NUMBER(S): [id number]   ORDERING CLINICIAN: FARZANA MIX   TECHNIQUE: 2 portable frontal views of the chest were obtained.   FINDINGS: The cardiomediastinal silhouette is within normal limits.   No focal consolidation, pleural effusion or pneumothorax.          1.  No radiographic evidence of acute cardiopulmonary process.       MACRO: None.   Signed by: Madelaine Walker 3/18/2024 2:51 AM Dictation workstation:   [id number]      Transthoracic Echo (TTE) Complete    Result Date: 3/18/2024          [place] [id number]  Tel [phone] Fax [phone] TRANSTHORACIC ECHOCARDIOGRAM REPORT  Patient Name:      TICO Herrera Physician:    [id number] Dustin Ramesh DO Study Date:        3/18/2024            Ordering Provider:    [id number] DOROTHY OLIVIA MRN/PID:           [id number]             Fellow: Accession#:        [id number]         Nurse:                Philip Quiñonez RN Date of Birth/Age: 1980 / 43 years Sonographer:          Shyann Kang RDCS Gender:            M                    Additional Staff: Height:            175.26 cm            Admit Date:           3/18/2024 Weight:            99.79 kg             Admission Status:     Inpatient -                                                               Routine BSA / BMI:         2.15 m2 / 32.49      Department Location:  [place] [address]                                      Echo Lab Blood Pressure: 149 /84 mmHg Study Type:    TRANSTHORACIC ECHO (TTE) COMPLETE Diagnosis/ICD: Transient cerebral ischemic attack, unspecified (NOT on                LCD)-G45.9 Indication:    Transischemic Attack CPT Codes:     Echo Complete w Full Doppler-[id number] Patient History: Pertinent History: Dyspnea, TIA and Polysubstance abuse. Study Detail: The following Echo studies were performed: 2D, M-Mode, Doppler and               color flow. Agitated saline used as a contrast agent for               intraseptal flow evaluation. The patient was awake.  PHYSICIAN INTERPRETATION: Left  Ventricle: Left ventricular systolic function is normal, with an estimated ejection fraction of 60%. There are no regional wall motion abnormalities. The left ventricular cavity size is normal. Spectral Doppler shows a normal pattern of left ventricular diastolic filling. LV Wall Scoring: All segments are normal. Left Atrium: The left atrium is mildly dilated. A bubble study using agitated saline was performed. Bubble study is positive. A small PFO (= 10 bubbles) was demonstrated. A bubble study shows that an intrapulmonary shunting is not present. Right Ventricle: The right ventricle is normal in size. There is normal right ventricular global systolic function. Right Atrium: The right atrium is normal in size. Aortic Valve: The aortic valve appears structurally normal. The aortic valve appears tricuspid. There is no evidence of aortic valve stenosis. There is no evidence of aortic valve regurgitation. The peak instantaneous gradient of the aortic valve is 7.8 mmHg. The mean gradient of the aortic valve is 4.0 mmHg. Mitral Valve: The mitral valve is normal in structure. There is no evidence of mitral valve stenosis. There is normal mitral valve leaflet mobility. There is no evidence of mitral valve regurgitation. Tricuspid Valve: The tricuspid valve is structurally normal. There is normal tricuspid valve leaflet mobility. There is trace tricuspid regurgitation. Pulmonic Valve: The pulmonic valve is structurally normal. There is no indication of pulmonic valve regurgitation. Pericardium: There is no pericardial effusion noted. Aorta: The aortic root is normal. Pulmonary Artery: The main pulmonary artery is normal in size, and position, with normal bifurcation into the left and right pulmonary arteries. Systemic Veins: The inferior vena cava appears to be of normal size. In comparison to the previous echocardiogram(s): Prior examinations are available and were reviewed for comparison purposes. The left ventricular  function is unchanged. The left ventricular diastolic function is normal.  CONCLUSIONS:  1. Left ventricular systolic function is normal with a 60% estimated ejection fraction.  2. There is no evidence of mitral valve stenosis.  3. No evidence of mitral valve regurgitation.  4. Trace tricuspid regurgitation is visualized.  5. Aortic valve stenosis is not present.  6. The main pulmonary artery is normal in size, and position, with normal bifurcation into the left and right pulmonary arteries.  7. A bubble study using agitated saline was performed. Bubble study is positive. A small PFO (= 10 bubbles) was demonstrated. QUANTITATIVE DATA SUMMARY: 2D MEASUREMENTS:                          Normal Ranges: Ao Root d:     3.10 cm   (2.0-3.7cm) LAs:           4.00 cm   (2.7-4.0cm) IVSd:          1.03 cm   (0.6-1.1cm) LVPWd:         0.98 cm   (0.6-1.1cm) LVIDd:         4.48 cm   (3.9-5.9cm) LVIDs:         2.98 cm LV Mass Index: 71.3 g/m2 LV % FS        33.5 % LA VOLUME:                               Normal Ranges: LA Vol A4C:        45.8 ml    (22+/-6mL/m2) LA Vol A2C:        40.9 ml LA Vol BP:         43.6 ml LA Vol Index A4C:  21.3ml/m2 LA Vol Index A2C:  19.0 ml/m2 LA Vol Index BP:   20.3 ml/m2 LA Area A4C:       17.3 cm2 LA Area A2C:       16.2 cm2 LA Major Axis A4C: 5.6 cm LA Major Axis A2C: 5.5 cm LA Volume Index:   19.2 ml/m2 RA VOLUME BY A/L METHOD:                               Normal Ranges: RA Vol A4C:        34.1 ml    (8.3-19.5ml) RA Vol Index A4C:  15.8 ml/m2 RA Area A4C:       15.1 cm2 RA Major Axis A4C: 5.7 cm AORTA MEASUREMENTS:                    Normal Ranges: Asc Ao, d: 3.00 cm (2.1-3.4cm) LV SYSTOLIC FUNCTION BY 2D PLANIMETRY (MOD):                     Normal Ranges: EF-A4C View: 56.8 % (>=55%) EF-A2C View: 60.2 % EF-Biplane:  55.9 % LV DIASTOLIC FUNCTION:                        Normal Ranges: MV Peak E:    0.93 m/s (0.7-1.2 m/s) MV Peak A:    0.64 m/s (0.42-0.7 m/s) E/A Ratio:    1.45     (1.0-2.2) MV e'          0.11 m/s (>8.0) MV lateral e' 0.11 m/s MV medial e'  0.11 m/s E/e' Ratio:   8.51     (<8.0) MITRAL VALVE:                 Normal Ranges: MV DT: 181 msec (150-240msec) AORTIC VALVE:                                   Normal Ranges: AoV Vmax:                1.40 m/s (<=1.7m/s) AoV Peak P.8 mmHg (<20mmHg) AoV Mean P.0 mmHg (1.7-11.5mmHg) LVOT Max Patricio:            1.12 m/s (<=1.1m/s) AoV VTI:                 30.70 cm (18-25cm) LVOT VTI:                23.10 cm LVOT Diameter:           2.10 cm  (1.8-2.4cm) AoV Area, VTI:           2.61 cm2 (2.5-5.5cm2) AoV Area,Vmax:           2.77 cm2 (2.5-4.5cm2) AoV Dimensionless Index: 0.75  RIGHT VENTRICLE: RV Basal 3.50 cm RV Mid   2.47 cm RV Major 7.4 cm TAPSE:   18.7 mm RV s'    0.14 m/s TRICUSPID VALVE/RVSP:                   Normal Ranges: IVC Diam: 1.75 cm PULMONIC VALVE:                         Normal Ranges: PV Accel Time: 185 msec (>120ms) PV Max Patricio:    0.8 m/s  (0.6-0.9m/s) PV Max P.7 mmHg  Kajal Ramesh DO Electronically signed on 3/18/2024 at 7:55:30 PM  Wall Scoring  ** Final **      Radiology:     Lower extremity venous duplex bilateral   Final Result   No sonographic evidence for deep vein thrombosis within the evaluated   veins of the bilateral lower extremities.        MACRO:   None        Signed by: Cosme Person 3/20/2024 6:11 AM   Dictation workstation:   [id number]      MR brain wo IV contrast   Final Result   Within limitations of some motion, unremarkable MRI of the brain. No   diffusion restriction abnormality is present to suggest an acute   infarct        MACRO:   None        Signed by: Delfin Warren 3/18/2024 9:32 PM   Dictation workstation:   [id number]      CT head wo IV contrast   Final Result   1.  No acute intracranial hemorrhage or acute territorial infarct. If   there is persistent clinical concern for acute ischemia, MRI can be   obtained for further evaluation.                        MACRO:   None.        Signed by: Madelaine Walker 3/18/2024 8:09 PM   Dictation workstation:   [id number]      Transthoracic Echo (TTE) Complete   Final Result      CT angio brain attack head w IV contrast and post procedure   Final Result   1. No evidence of an acute ischemic infarct, acute intracranial   hemorrhage, or mass effect.        2. 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 No associated   dissection flap, mural thickening, or significant stenosis.        3. The remainder of the CTA head and neck is unremarkable.             MACRO:   Yasmany Smalls discussed the significance and urgency of this   critical finding by NIKKIE ANGEL with  FARZANA MIX on 3/18/2024 at   3:19 am with confirmation of receipt .  (**-RCF-**) Findings:  See   findings.        Signed by: Yasmany Smalls 3/18/2024 3:19 AM   Dictation workstation:   [id number]      CT angio brain attack neck w IV contrast and post procedure   Final Result   1. No evidence of an acute ischemic infarct, acute intracranial   hemorrhage, or mass effect.        2. 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 No associated   dissection flap, mural thickening, or significant stenosis.        3. The remainder of the CTA head and neck is unremarkable.             MACRO:   Yasmany Smalls discussed the significance and urgency of this   critical finding by NIKKIE ANGEL with  FARZANA MIX on 3/18/2024 at   3:19 am with confirmation of receipt .  (**-RCF-**) Findings:  See   findings.        Signed by: Yasmany Smalls 3/18/2024 3:19 AM   Dictation workstation:   [id number]      CT brain attack head wo IV contrast   Final Result   1. No evidence of an acute ischemic infarct, acute intracranial   hemorrhage, or mass effect.        2. 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 No associated   dissection flap, mural thickening, or significant stenosis.        3. The remainder of the CTA head and neck is unremarkable.             MACRO:   Yasmany Smalls discussed the significance and urgency of this   critical finding by NIKKIE ANGEL with  FARZANA MIX on 3/18/2024 at   3:19 am with confirmation of receipt .  (**-RCF-**) Findings:  See   findings.        Signed by: Yasmany Smalls 3/18/2024 3:19 AM   Dictation workstation:   [id number]      XR chest 1 view   Final Result   1.  No radiographic evidence of acute cardiopulmonary process.                  MACRO:   None.        Signed by: Madelaine Walker 3/18/2024 2:51 AM   Dictation workstation:   [id number]          Problem List:     Patient Active Problem List   Diagnosis    TIA (transient ischemic attack)    Anxiety and depression    Tobacco use disorder    OD (overdose of drug), intentional self-harm, sequela (CMS/[facility])    Drug overdose, intentional self-harm, initial encounter (CMS/[facility])    Stroke-like symptoms    Left-sided weakness    Dysarthria       Assessment:   Acute CVA with left-sided hemiparesis, facial droop  Substance abuse  Anxiety/depression  Small PFO  EF 60%      Plan:   Tele monitoring  2 d echo done  EKG now please  Neuro input thank you  Spoke with patient at length regarding no more drug use  DAYANARA today well informed of risk versus benefits verbalized understanding would like to proceed with procedure    Further recommendations per Dr michael Perez CNP  [facility]      Of note, this documentation is completed using the Dragon Dictation system (voice recognition software). There may be spelling and/or grammatical errors that were not corrected prior to final submission.      Electronically signed by Johnson Perez, APRN-CNP, on 3/20/2024 at 8:47 AM     Michael Note  I have personally reviewed and edited note to reflect  my history, physical, review of testing, assessment, plan.  Review of systems are negative, noncontributory, as previous mentioned x 12 systems.  Exam: Heart-regular, lungs-clear to auscultation  I personally reviewed EKG and chest x-ray:  March 18 EKG: Sinus tachycardia 122 bpm  March 18 chest x-ray: No acute cardiopulmonary disease    Assessment:  Stroke like symptoms/left hemiparesis/facial droop  Previous TIA  Patent foramen ovale  Cocaine abuse  Tobacco abuse  Medical noncompliance  Hypertension    Recommendations:  Transesophageal echocardiogram-risk and benefit discussed with patient willing to proceed.  This is if not the patient's first neurologic event.  He was seen by neurology for TIA and was advised aspirin and statin.  He has not been compliant with medical therapy.  He now has symptoms that are clinically consistent with stroke however no specific findings on brain MRI or CT.  Not clear if there is an embolic etiology to presentation.  Onset of symptoms occurred after cocaine use.  At this time it appears that neurology is advising aspirin and statin.  Consider Plavix? Consider anticoagulation?    MLanger

## 2024-03-20 NOTE — NURSING NOTE
Patient  c/o not eating all day stating that we are starving literally standing at bedside stomping his feet and shaking his fists.  Patient has had breakfast, was NPO for lunch but once returned from [facility] had a couple turkey sandwiches and 2 macaroni and cheese.

## 2024-03-20 NOTE — PROGRESS NOTES
[facility] has accepted pt., requested they start ins. Precert.  Requested cnc complete 03202 needed for precert with [employer]

## 2024-03-20 NOTE — PROGRESS NOTES
Physical Therapy                 Therapy Communication Note    Patient Name: Jean-Pierre Stubbs  MRN: [id number]  Today's Date: 3/20/2024     Discipline: Physical Therapy    Missed Visit Reason:  (11:38, Patient declined therapy 2° going for DAYANARA. Nursing aware.)

## 2024-03-20 NOTE — PROGRESS NOTES
Jean-Pierre Stubbs is a 43 y.o. male on day 0 of admission presenting with Stroke-like symptoms.      Subjective   The patient continues to have left sided weakness and he is developing stuttering.        MRI brain no evidence of stroke:  Within limitations of some motion, unremarkable MRI of the brain. No  diffusion restriction abnormality is present to suggest an acute  infarct       TTE :   1. Left ventricular systolic function is normal with a 60% estimated ejection fraction.   2. There is no evidence of mitral valve stenosis.   3. No evidence of mitral valve regurgitation.   4. Trace tricuspid regurgitation is visualized.   5. Aortic valve stenosis is not present.   6. The main pulmonary artery is normal in size, and position, with normal bifurcation into the left and right pulmonary arteries.   7. A bubble study using agitated saline was performed. Bubble study is positive. A small PFO (= 10 bubbles) was demonstrated.    The patient was evaluated by cardiology.  There was a recommendation to perform DAYANARA evaluation and to put him on aspirin 81 mg p.o. daily and Plavix 75 mg p.o. daily.    Doppler study of bilateral lower extremities does not reveal any evidence of DVT    Results for orders placed or performed during the hospital encounter of 03/18/24 (from the past 96 hour(s))   ECG 12 lead   Result Value Ref Range    Ventricular Rate 122 BPM    Atrial Rate 182 BPM    QRS Duration 70 ms    QT Interval 424 ms    QTC Calculation(Bazett) 604 ms    R Axis 88 degrees    T Axis 38 degrees    QRS Count 20 beats    Q Onset 223 ms    T Offset 435 ms    QTC Fredericia 537 ms   CBC and Auto Differential   Result Value Ref Range    WBC 7.8 4.4 - 11.3 x10*3/uL    nRBC 0.0 0.0 - 0.0 /100 WBCs    RBC 5.94 (H) 4.50 - 5.90 x10*6/uL    Hemoglobin 15.5 13.5 - 17.5 g/dL    Hematocrit 48.3 41.0 - 52.0 %    MCV 81 80 - 100 fL    MCH 26.1 26.0 - 34.0 pg    MCHC 32.1 32.0 - 36.0 g/dL    RDW 15.5 (H) 11.5 - 14.5 %    Platelets 229 150 -  450 x10*3/uL    Neutrophils % 68.4 40.0 - 80.0 %    Immature Granulocytes %, Automated 0.3 0.0 - 0.9 %    Lymphocytes % 20.5 13.0 - 44.0 %    Monocytes % 9.9 2.0 - 10.0 %    Eosinophils % 0.4 0.0 - 6.0 %    Basophils % 0.5 0.0 - 2.0 %    Neutrophils Absolute 5.30 1.20 - 7.70 x10*3/uL    Immature Granulocytes Absolute, Automated 0.02 0.00 - 0.70 x10*3/uL    Lymphocytes Absolute 1.59 1.20 - 4.80 x10*3/uL    Monocytes Absolute 0.77 0.10 - 1.00 x10*3/uL    Eosinophils Absolute 0.03 0.00 - 0.70 x10*3/uL    Basophils Absolute 0.04 0.00 - 0.10 x10*3/uL   Comprehensive metabolic panel   Result Value Ref Range    Glucose 174 (H) 74 - 99 mg/dL    Sodium 138 136 - 145 mmol/L    Potassium 3.5 3.5 - 5.3 mmol/L    Chloride 101 98 - 107 mmol/L    Bicarbonate 26 21 - 32 mmol/L    Anion Gap 15 10 - 20 mmol/L    Urea Nitrogen 11 6 - 23 mg/dL    Creatinine 0.95 0.50 - 1.30 mg/dL    eGFR >90 >60 mL/min/1.73m*2    Calcium 9.8 8.6 - 10.3 mg/dL    Albumin 4.7 3.4 - 5.0 g/dL    Alkaline Phosphatase 60 33 - 120 U/L    Total Protein 7.6 6.4 - 8.2 g/dL    AST 15 9 - 39 U/L    Bilirubin, Total 0.4 0.0 - 1.2 mg/dL    ALT 18 10 - 52 U/L   Magnesium   Result Value Ref Range    Magnesium 1.99 1.60 - 2.40 mg/dL   Troponin I, High Sensitivity, Initial   Result Value Ref Range    Troponin I, High Sensitivity <3 0 - 20 ng/L   Troponin, High Sensitivity, 1 Hour   Result Value Ref Range    Troponin I, High Sensitivity 3 0 - 20 ng/L   Lipid Panel   Result Value Ref Range    Cholesterol 94 0 - 199 mg/dL    HDL-Cholesterol 31.2 mg/dL    Cholesterol/HDL Ratio 3.0     LDL Calculated 42 <=99 mg/dL    VLDL 21 0 - 40 mg/dL    Triglycerides 105 0 - 149 mg/dL    Non HDL Cholesterol 63 0 - 149 mg/dL   Hemoglobin A1C   Result Value Ref Range    Hemoglobin A1C 5.1 see below %    Estimated Average Glucose 100 Not Established mg/dL   TSH   Result Value Ref Range    Thyroid Stimulating Hormone 1.82 0.44 - 3.98 mIU/L   Drug Screen, Urine   Result Value Ref Range     Amphetamine Screen, Urine Presumptive Negative Presumptive Negative    Barbiturate Screen, Urine Presumptive Negative Presumptive Negative    Benzodiazepines Screen, Urine Presumptive Negative Presumptive Negative    Cannabinoid Screen, Urine Presumptive Positive (A) Presumptive Negative    Cocaine Metabolite Screen, Urine Presumptive Positive (A) Presumptive Negative    Fentanyl Screen, Urine Presumptive Negative Presumptive Negative    Opiate Screen, Urine Presumptive Negative Presumptive Negative    Oxycodone Screen, Urine Presumptive Negative Presumptive Negative    PCP Screen, Urine Presumptive Negative Presumptive Negative    Methadone Screen, Urine Presumptive Negative Presumptive Negative   POCT GLUCOSE   Result Value Ref Range    POCT Glucose 103 (H) 74 - 99 mg/dL   Transthoracic Echo (TTE) Complete   Result Value Ref Range    AV mn grad 4.0 mmHg    AV pk shelly 1.40 m/s    LV biplane EF 56 %    LVOT diam 2.10 cm    MV E/A ratio 1.45     Tricuspid annular plane systolic excursion 1.9 cm    MV avg E/e' ratio 8.51     LA vol index A/L 20.3 ml/m2    RV free wall pk S' 13.90 cm/s    LVIDd 4.48 cm    Aortic Valve Area by Continuity of Peak Velocity 2.77 cm2    AV pk grad 7.8 mmHg    Aortic Valve Area by Continuity of VTI 2.61 cm2    LV A4C EF 56.8    POCT GLUCOSE   Result Value Ref Range    POCT Glucose 122 (H) 74 - 99 mg/dL   POCT GLUCOSE   Result Value Ref Range    POCT Glucose 120 (H) 74 - 99 mg/dL   POCT GLUCOSE   Result Value Ref Range    POCT Glucose 90 74 - 99 mg/dL   POCT GLUCOSE   Result Value Ref Range    POCT Glucose 99 74 - 99 mg/dL   POCT GLUCOSE   Result Value Ref Range    POCT Glucose 133 (H) 74 - 99 mg/dL   POCT GLUCOSE   Result Value Ref Range    POCT Glucose 127 (H) 74 - 99 mg/dL   POCT GLUCOSE   Result Value Ref Range    POCT Glucose 116 (H) 74 - 99 mg/dL   ECG 12 Lead   Result Value Ref Range    Ventricular Rate 65 BPM    Atrial Rate 65 BPM    IA Interval 192 ms    QRS Duration 90 ms    QT  "Interval 382 ms    QTC Calculation(Bazett) 397 ms    P Axis 54 degrees    R Axis 72 degrees    T Axis 27 degrees    QRS Count 11 beats    Q Onset 225 ms    P Onset 129 ms    P Offset 181 ms    T Offset 416 ms    QTC Fredericia 392 ms   POCT GLUCOSE   Result Value Ref Range    POCT Glucose 102 (H) 74 - 99 mg/dL              Objective     Last Recorded Vitals  Blood pressure 110/64, pulse 56, temperature 36.4 °C (97.5 °F), temperature source Temporal, resp. rate 18, height 1.753 m (5' 9\"), weight 99.8 kg (220 lb), SpO2 95 %.  Left sided weakness.     Physical Exam  Neurological Exam  Awake alert oriented, respiratory with speech, mild left-sided weakness arm and leg relatively improved compared to yesterday otherwise intact muscle strength well-controlled.  His gait was not tested.    Relevant Results            NIH Stroke Scale  1A. Level of Consciousness: Alert, Keenly Responsive  1B. Ask Month and Age: Both Questions Right  1C. Blink Eyes & Squeeze Hands: Performs Both Tasks  2. Best Gaze: Normal  3. Visual: No Visual Loss  4. Facial Palsy: Partial Paralysis  5A. Motor - Left Arm: Some Effort Against Gravity  5B. Motor - Right Arm: No Drift  6A. Motor - Left Leg: Some Effort Against Gravity  6B. Motor - Right Leg: No Drift  7. Limb Ataxia: Absent  8. Sensory Loss: Normal  9. Best Language: No Aphasia  10. Dysarthria: Mild-to-Moderate Dysarthria  11. Extinction and Inattention: No Abnormality  NIH Stroke Scale: 7           Stefanie Coma Scale  Best Eye Response: Spontaneous  Best Verbal Response: Oriented  Best Motor Response: Follows commands  Stefanie Coma Scale Score: 15                             Assessment/Plan      Principal Problem:    Stroke-like symptoms  Active Problems:    Left-sided weakness    Dysarthria    Acute onset of left hemiparesis, facial droop, and a stroke scale is 7 likely related to right internal capsule stroke by history of hypertension, cocaine use.  NIH stroke scale is 7  Patient refused " IV TNK treatment.  I agree with stroke workup as already initiated, including:     Brain MR is negative ( negative MRI of the brain doesn't r/o as small lacunar stroke)     Transthoracic echocardiogram reported PFO ( I suggest cardiology evaluation for PFO management from cardiac perspective by a stroke), and Doppler study of bilateral lower extremities to r/o DVT.    Doppler study of bilateral lower extremity did not reveal evidence of Deep venous thrombosis.    I suggest:  Aspirin 81 mg p.o. daily in addition to Plavix 75 mg p.o. daily    Lipitor with LDL goal < 70    PT/OT evaluation ( will benefit from acute rehab)    To discontinue smoking and Cocaine use.       Cardiac telemetry      To allow permissive hypertension for now, and to aim for < 120/70 mm/hg in one week.     OT evaluation for capacity to work, and drive in 6 weeks post stroke ( till then, no driving or working).      No further neuro workup is needed.    I spent 30 minutes in the professional and overall care of this patient.      Chasity Pandya MD

## 2024-03-20 NOTE — NURSING NOTE
"Patient call light answered by PCT, patient yelling at significant other that he was not going to stay here and wanted to leave, RN notified AMA paperwork taken to room.  Explained to patient that the form needed to be signed and IV needs to come out before he can leave. Patient starts to yell stating \"who told you that I wanted to leave, explained that he and significant other told PCT who told me the RN.  Patient starts to become louder more belligerent and swinging his arms. Yells at RN states \"did I tell you that I want to Leave?\" RN responds yes when I walked into the room. Patient noted to have no stutter and clear speech no weakness of limbs noted.  Patient continues to yell, significant other  noted to be in tears. Patient states that he was not informed of how his test went, explained that cardiology will be in to explain test results more than likely tomorrow. Patient did continue to yell and be belligerent as nurse was leaving the room after patient refused to sign AMA.                         "

## 2024-03-20 NOTE — PROGRESS NOTES
ASSESSMENT & PLAN:     L sided weakness  L facial droop  Dysarthria  Hx of TIA  -p/w acute onset of above symptoms c/f TIA vs stroke  -CT brain noncon neg for acute findings. Declined TNK in ED.   -CTA H+N neg for LVO  -MRI brain was unremarkable, did not show any focus of acute stroke, however per neurology can still have small lacunar stroke missed by MRI  -TTE showed normal LVEF, but did show small PFO. BLE Doppler neg DVT.   -A1C, lipid panel unremarkable  -still has neuro deficits c/w stroke syndrome, was in s/o HTN and cocaine use  Plan:  -neuro following  -cardiology consulted for PFO, rec DAYANARA  -cont ASA 81, high intensity statin. Further antiplatelet vs AC per neurology.   -lovenox for vte ppx  -pt/ot rec high intensity, tcc looking into rehab options  -no driving or working x6w    HTN  -cont low dose amlodipine    Tobacco use disorder  Cocaine abuse  MJ use  -cessation education    Dispo: snf  Vte ppx on lovenox    Vinicio Huffman MD    SUBJECTIVE     NAEON. No changes. Still has dysarthria, and mild L sided weakness.     OBJECTIVE:       Last Recorded Vitals:  Vitals:    03/19/24 2000 03/20/24 0000 03/20/24 0400 03/20/24 0743   BP: 131/67 133/72 112/64 110/64   BP Location: Left arm  Left arm Right arm   Patient Position: Lying  Lying Lying   Pulse: 78 92 68 56   Resp: 16 20 16 18   Temp: 37.1 °C (98.8 °F) 36.8 °C (98.2 °F) 36.9 °C (98.4 °F) 36.4 °C (97.5 °F)   TempSrc: Temporal Temporal Temporal Temporal   SpO2: 98% 97% 96% 95%   Weight:       Height:           Last I/O:  No intake/output data recorded.    Physical Exam:  GEN: appears stated age, NAD  CV: RRR, no m/r/g, no LE edema  LUNGS: CTAB, no w/r/c  ABD: soft, NT, ND, NBS  SKIN: no rashes  MSK; no gross deformities, normal joints  NEURO: alert, oriented x3, L facial droop, dysarthric, LUE 4/5, LLE 3/5 strength  PSYCH: appropriate mood, affect    Inpatient Medications:  amLODIPine, 2.5 mg, oral, Daily  aspirin, 81 mg, oral, Daily  atorvastatin, 80 mg,  oral, Nightly  enoxaparin, 40 mg, subcutaneous, Daily  polyethylene glycol, 17 g, oral, Daily        PRN Medications  PRN medications: acetaminophen, ipratropium-albuteroL, oxygen    Continuous Medications:         LABS AND IMAGING:     Labs:  Results for orders placed or performed during the hospital encounter of 03/18/24 (from the past 24 hour(s))   POCT GLUCOSE   Result Value Ref Range    POCT Glucose 99 74 - 99 mg/dL   POCT GLUCOSE   Result Value Ref Range    POCT Glucose 133 (H) 74 - 99 mg/dL   POCT GLUCOSE   Result Value Ref Range    POCT Glucose 127 (H) 74 - 99 mg/dL   POCT GLUCOSE   Result Value Ref Range    POCT Glucose 116 (H) 74 - 99 mg/dL   ECG 12 Lead   Result Value Ref Range    Ventricular Rate 65 BPM    Atrial Rate 65 BPM    OK Interval 192 ms    QRS Duration 90 ms    QT Interval 382 ms    QTC Calculation(Bazett) 397 ms    P Axis 54 degrees    R Axis 72 degrees    T Axis 27 degrees    QRS Count 11 beats    Q Onset 225 ms    P Onset 129 ms    P Offset 181 ms    T Offset 416 ms    QTC Fredericia 392 ms        Imaging:  ECG 12 Lead  Normal sinus rhythm  ST elevation, consider early repolarization, pericarditis, or injury  Abnormal ECG  When compared with ECG of 18-MAR-2024 01:59,  Sinus rhythm has replaced Junctional rhythm  Vent. rate has decreased BY  57 BPM  ST elevation now present in Anterolateral leads  Nonspecific T wave abnormality no longer evident in Anterior leads  Lower extremity venous duplex bilateral  Narrative: Interpreted By:  Cosme Person,   STUDY:  [facility] LOWER EXTREMITY VENOUS DUPLEX BILATERAL;  3/19/2024 7:36 pm      INDICATION:  Signs/Symptoms:Rule out DVT, by a history of stroke and PFO..      COMPARISON:  None.      ACCESSION NUMBER(S):  [id number]      ORDERING CLINICIAN:  JOHNSON HAYNES      TECHNIQUE:  Vascular ultrasound of the bilateral lower extremities was performed.  Real-time compression views as well as Gray scale, color Doppler and  spectral Doppler waveform analysis  was performed.      FINDINGS:  Evaluation of the visualized portions of the bilateral common  femoral, proximal, mid, and distal femoral, and popliteal veins was  performed.  Evaluation of the visualized portions of the posterior  tibial and peroneal veins was also performed.      Limitations: None      The evaluated veins demonstrate normal compressibility. There is  intact venous flow demonstrating normal respiratory variability and  normal augmentation of flow with calf compression.          Impression: No sonographic evidence for deep vein thrombosis within the evaluated  veins of the bilateral lower extremities.      MACRO:  None      Signed by: Cosme Person 3/20/2024 6:11 AM  Dictation workstation:   [id number]

## 2024-03-20 NOTE — POST-PROCEDURE NOTE
Physician Transition of Care Summary  Invasive Cardiovascular Lab    Procedure Date: 03/20/24     Pre Procedure Indications:   CVA/TIA, rule out cardiac source of embolus    Post Procedure Diagnosis:   Small PFO with right to left shunt by bubble study    Procedure(s):   Transesophageal echocardiogram    Procedure Findings:   Normal left ventricular systolic function with EF of 60 to 65%.  Moderate concentric left ventricular hypertrophy  Normal size left atrial appendage with no obvious thrombus or masses visualized.  Positive bubble study with a right to left shunt, consistent with a small PFO  Trivial mitral and tricuspid regurgitation.  No significant plaque in descending thoracic aorta.      Description of the Procedure:   Transesophageal echocardiogram    Complications:   None    Estimated Blood Loss:   None    Anesthesia: Conscious sedation     any Specimen(s) Removed: None      Electronically signed by: Darrel Moreno MD, 3/20/2024

## 2024-03-21 ENCOUNTER — HOSPITAL ENCOUNTER (INPATIENT)
Dept: CARDIOLOGY | Facility: HOSPITAL | Age: 44
Discharge: HOME | DRG: 065 | End: 2024-03-21
Payer: COMMERCIAL

## 2024-03-21 VITALS
SYSTOLIC BLOOD PRESSURE: 114 MMHG | BODY MASS INDEX: 32.58 KG/M2 | RESPIRATION RATE: 18 BRPM | DIASTOLIC BLOOD PRESSURE: 61 MMHG | WEIGHT: 220 LBS | OXYGEN SATURATION: 98 % | HEART RATE: 76 BPM | HEIGHT: 69 IN | TEMPERATURE: 98.2 F

## 2024-03-21 VITALS
TEMPERATURE: 97.9 F | HEART RATE: 69 BPM | SYSTOLIC BLOOD PRESSURE: 134 MMHG | DIASTOLIC BLOOD PRESSURE: 86 MMHG | OXYGEN SATURATION: 93 %

## 2024-03-21 LAB
ANION GAP SERPL CALC-SCNC: 11 MMOL/L (ref 10–20)
BUN SERPL-MCNC: 10 MG/DL (ref 6–23)
CALCIUM SERPL-MCNC: 9.1 MG/DL (ref 8.6–10.3)
CHLORIDE SERPL-SCNC: 108 MMOL/L (ref 98–107)
CO2 SERPL-SCNC: 26 MMOL/L (ref 21–32)
CREAT SERPL-MCNC: 0.8 MG/DL (ref 0.5–1.3)
EGFRCR SERPLBLD CKD-EPI 2021: >90 ML/MIN/1.73M*2
ERYTHROCYTE [DISTWIDTH] IN BLOOD BY AUTOMATED COUNT: 15.3 % (ref 11.5–14.5)
GLUCOSE BLD MANUAL STRIP-MCNC: 104 MG/DL (ref 74–99)
GLUCOSE BLD MANUAL STRIP-MCNC: 111 MG/DL (ref 74–99)
GLUCOSE SERPL-MCNC: 133 MG/DL (ref 74–99)
HCT VFR BLD AUTO: 43.6 % (ref 41–52)
HGB BLD-MCNC: 13.6 G/DL (ref 13.5–17.5)
HOLD SPECIMEN: NORMAL
MAGNESIUM SERPL-MCNC: 1.99 MG/DL (ref 1.6–2.4)
MCH RBC QN AUTO: 25.6 PG (ref 26–34)
MCHC RBC AUTO-ENTMCNC: 31.2 G/DL (ref 32–36)
MCV RBC AUTO: 82 FL (ref 80–100)
NRBC BLD-RTO: 0 /100 WBCS (ref 0–0)
PLATELET # BLD AUTO: 203 X10*3/UL (ref 150–450)
POTASSIUM SERPL-SCNC: 3.5 MMOL/L (ref 3.5–5.3)
RBC # BLD AUTO: 5.32 X10*6/UL (ref 4.5–5.9)
SODIUM SERPL-SCNC: 141 MMOL/L (ref 136–145)
WBC # BLD AUTO: 5.9 X10*3/UL (ref 4.4–11.3)

## 2024-03-21 PROCEDURE — 80048 BASIC METABOLIC PNL TOTAL CA: CPT | Performed by: INTERNAL MEDICINE

## 2024-03-21 PROCEDURE — 36415 COLL VENOUS BLD VENIPUNCTURE: CPT | Performed by: INTERNAL MEDICINE

## 2024-03-21 PROCEDURE — 2500000004 HC RX 250 GENERAL PHARMACY W/ HCPCS (ALT 636 FOR OP/ED): Performed by: STUDENT IN AN ORGANIZED HEALTH CARE EDUCATION/TRAINING PROGRAM

## 2024-03-21 PROCEDURE — 93010 ELECTROCARDIOGRAM REPORT: CPT | Performed by: INTERNAL MEDICINE

## 2024-03-21 PROCEDURE — 99232 SBSQ HOSP IP/OBS MODERATE 35: CPT | Performed by: NURSE PRACTITIONER

## 2024-03-21 PROCEDURE — 97535 SELF CARE MNGMENT TRAINING: CPT | Mod: GO,CO

## 2024-03-21 PROCEDURE — 82947 ASSAY GLUCOSE BLOOD QUANT: CPT

## 2024-03-21 PROCEDURE — 97530 THERAPEUTIC ACTIVITIES: CPT | Mod: GP,CQ

## 2024-03-21 PROCEDURE — 2500000002 HC RX 250 W HCPCS SELF ADMINISTERED DRUGS (ALT 637 FOR MEDICARE OP, ALT 636 FOR OP/ED): Performed by: INTERNAL MEDICINE

## 2024-03-21 PROCEDURE — 99239 HOSP IP/OBS DSCHRG MGMT >30: CPT | Performed by: INTERNAL MEDICINE

## 2024-03-21 PROCEDURE — 93005 ELECTROCARDIOGRAM TRACING: CPT

## 2024-03-21 PROCEDURE — 2500000001 HC RX 250 WO HCPCS SELF ADMINISTERED DRUGS (ALT 637 FOR MEDICARE OP): Performed by: INTERNAL MEDICINE

## 2024-03-21 PROCEDURE — 83735 ASSAY OF MAGNESIUM: CPT | Performed by: INTERNAL MEDICINE

## 2024-03-21 PROCEDURE — 85027 COMPLETE CBC AUTOMATED: CPT | Performed by: INTERNAL MEDICINE

## 2024-03-21 RX ORDER — CLOPIDOGREL BISULFATE 75 MG/1
75 TABLET ORAL DAILY
Qty: 30 TABLET | Refills: 2 | Status: SHIPPED | OUTPATIENT
Start: 2024-03-22 | End: 2024-06-20

## 2024-03-21 RX ORDER — AMLODIPINE BESYLATE 2.5 MG/1
2.5 TABLET ORAL DAILY
Qty: 30 TABLET | Refills: 11 | Status: SHIPPED | OUTPATIENT
Start: 2024-03-22 | End: 2024-03-21 | Stop reason: SDUPTHER

## 2024-03-21 RX ORDER — CLOPIDOGREL BISULFATE 75 MG/1
75 TABLET ORAL DAILY
Qty: 30 TABLET | Refills: 11 | Status: SHIPPED | OUTPATIENT
Start: 2024-03-22 | End: 2024-03-21 | Stop reason: SDUPTHER

## 2024-03-21 RX ORDER — AMLODIPINE BESYLATE 2.5 MG/1
2.5 TABLET ORAL DAILY
Qty: 30 TABLET | Refills: 2 | Status: SHIPPED | OUTPATIENT
Start: 2024-03-22 | End: 2024-04-17 | Stop reason: ALTCHOICE

## 2024-03-21 RX ORDER — POLYETHYLENE GLYCOL 3350 17 G/17G
17 POWDER, FOR SOLUTION ORAL DAILY PRN
Status: DISCONTINUED | OUTPATIENT
Start: 2024-03-21 | End: 2024-03-21 | Stop reason: HOSPADM

## 2024-03-21 RX ORDER — POTASSIUM CHLORIDE 20 MEQ/1
40 TABLET, EXTENDED RELEASE ORAL ONCE
Status: COMPLETED | OUTPATIENT
Start: 2024-03-21 | End: 2024-03-21

## 2024-03-21 RX ORDER — ASPIRIN 81 MG/1
81 TABLET ORAL DAILY
Qty: 30 TABLET | Refills: 11 | Status: SHIPPED | OUTPATIENT
Start: 2024-03-22 | End: 2024-03-21 | Stop reason: SDUPTHER

## 2024-03-21 RX ORDER — ATORVASTATIN CALCIUM 80 MG/1
80 TABLET, FILM COATED ORAL NIGHTLY
Qty: 30 TABLET | Refills: 2 | Status: SHIPPED | OUTPATIENT
Start: 2024-03-21 | End: 2024-06-19

## 2024-03-21 RX ORDER — ASPIRIN 81 MG/1
81 TABLET ORAL DAILY
Qty: 30 TABLET | Refills: 2 | Status: SHIPPED | OUTPATIENT
Start: 2024-03-22 | End: 2024-06-20

## 2024-03-21 RX ORDER — ATORVASTATIN CALCIUM 20 MG/1
20 TABLET, FILM COATED ORAL DAILY
Qty: 30 TABLET | Refills: 11 | Status: SHIPPED | OUTPATIENT
Start: 2024-03-21 | End: 2024-03-21 | Stop reason: HOSPADM

## 2024-03-21 RX ADMIN — ENOXAPARIN SODIUM 40 MG: 40 INJECTION SUBCUTANEOUS at 08:58

## 2024-03-21 RX ADMIN — ASPIRIN 81 MG: 81 TABLET, COATED ORAL at 08:57

## 2024-03-21 RX ADMIN — AMLODIPINE BESYLATE 2.5 MG: 5 TABLET ORAL at 08:57

## 2024-03-21 RX ADMIN — CLOPIDOGREL 75 MG: 75 TABLET ORAL at 08:57

## 2024-03-21 RX ADMIN — POTASSIUM CHLORIDE 40 MEQ: 1500 TABLET, EXTENDED RELEASE ORAL at 08:58

## 2024-03-21 ASSESSMENT — COGNITIVE AND FUNCTIONAL STATUS - GENERAL
MOVING TO AND FROM BED TO CHAIR: A LITTLE
DAILY ACTIVITIY SCORE: 24
TOILETING: A LITTLE
HELP NEEDED FOR BATHING: A LITTLE
PERSONAL GROOMING: A LITTLE
DRESSING REGULAR LOWER BODY CLOTHING: A LITTLE
CLIMB 3 TO 5 STEPS WITH RAILING: A LOT
CLIMB 3 TO 5 STEPS WITH RAILING: A LITTLE
TURNING FROM BACK TO SIDE WHILE IN FLAT BAD: A LITTLE
STANDING UP FROM CHAIR USING ARMS: A LITTLE
MOVING FROM LYING ON BACK TO SITTING ON SIDE OF FLAT BED WITH BEDRAILS: A LITTLE
MOBILITY SCORE: 23
DRESSING REGULAR UPPER BODY CLOTHING: A LITTLE
WALKING IN HOSPITAL ROOM: A LITTLE
DAILY ACTIVITIY SCORE: 19
MOBILITY SCORE: 17

## 2024-03-21 ASSESSMENT — PAIN SCALES - GENERAL
PAINLEVEL_OUTOF10: 0 - NO PAIN
PAINLEVEL_OUTOF10: 0 - NO PAIN

## 2024-03-21 ASSESSMENT — PAIN - FUNCTIONAL ASSESSMENT
PAIN_FUNCTIONAL_ASSESSMENT: 0-10

## 2024-03-21 ASSESSMENT — ACTIVITIES OF DAILY LIVING (ADL): HOME_MANAGEMENT_TIME_ENTRY: 17

## 2024-03-21 NOTE — PROGRESS NOTES
Physical Therapy    Physical Therapy Treatment    Patient Name: Jean-Pierre Stubbs  MRN: [id number]  Today's Date: 3/21/2024  Time Calculation  Start Time: 1030  Stop Time: 1115  Time Calculation (min): 45 min       Assessment/Plan   PT Assessment  PT Assessment Results: Decreased strength, Impaired balance, Decreased mobility  Rehab Prognosis: Good  Treatment Tolerance: Patient tolerated treatment well, Patient limited by fatigue, Patient limited by pain  Medical Staff Made Aware: Yes  End of Session Communication: Bedside nurse, PCT/NA/KATLIN  Assessment Comment: Patient continues to require (A) for safety with transfers/amb. Patient will benefit from additional PT to address deficits and improve mobility.  End of Session Patient Position: Bed, 2 rail up (Sitting EOB; Call light, phone, and tray table within reach.)  PT Plan  Inpatient/Swing Bed or Outpatient: Inpatient  Treatment/Interventions: Transfer training, Gait training, Balance training, Stair training, Strengthening, Therapeutic activity  PT Plan: Skilled PT  PT Frequency: 4 times per week  PT Discharge Recommendations:  (High intensity and high frequency with 24/7 care)  Equipment Recommended upon Discharge:  (TBD)   PT Recommended Transfer Status: Assist x1    General Visit Information:   PT  Visit  PT Received On: 03/21/24  General  Family/Caregiver Present: Yes (Girlfriend present and supportive)  Co-Treatment: OT  Co-Treatment Reason: Co-treat with OT to maximize patient and staff safety with transfers/amb.  Prior to Session Communication: Bedside nurse, PCT/NA/KATLIN  Patient Position Received: Bed, 3 rail up (No alarm.)  General Comment: Pleasant and cooperative. Patient states he doesn't want to go to SNF, wants to go home instead. TCC informed.    General Observations:   General Observation: (L)LE weakness.    Subjective     Precautions:  Precautions  Medical Precautions: Fall precautions    Vital Signs:     Objective     Pain:  Pain Assessment  Pain  Assessment: 0-10 (c/o (L)lateral foot pain and (L)hip pain, but did not give a numeric pain rating. Patient tends to have increased WB on lateral surface of (L)foot during amb; switches to NWB(L)LE when too uncomfortable. Still c/o tingling(pins and needles) sensation (L)LE.Nursing aware.)    Cognition:  Cognition  Overall Cognitive Status: Within Functional Limits  Orientation Level: Oriented X4  Impulsive: Mildly    Balance:   Static Sitting: G  Dynamic Sitting: F+  Static Standing: F with ww  Dynamic Standing: F- with ww    Treatments:  Therapeutic Exercise  Therapeutic Exercise Performed: Yes  Therapeutic Exercise Activity 2: Instructed patient in seated ther ex(AROM BLE with AP/LAQ/Hipflex x2-3reps) and standing ther ex(L-hip flex/abd and mini-squats x2-3reps). Patient c/o (L)hip pain with abduction exercise, but did not give a numeric pain rating. Also instructed patient in supine SKTC stretches as tolerated.     Balance/Neuromuscular Re-Education  Balance/Neuromuscular Re-Education Activity Performed: Yes  Balance/Neuromuscular Re-Education Activity 1: Multi-level dynamic reaching (L/R/C/Above shoulder/Below waist/Across midline) with x1UE support. No LOB.  Bed Mobility  Bed Mobility: Yes  Bed Mobility 1  Bed Mobility 1: Supine to sitting  Level of Assistance 1:  (SBA)  Bed Mobility Comments 1: HOB flat. Patient uses bed rail for support with transition to EOB.  Ambulation/Gait Training  Ambulation/Gait Training Performed: Yes  Ambulation/Gait Training 1  Surface 1: Level tile  Device 1: Rolling walker  Gait Support Devices: Gait belt  Assistance 1: Minimum assistance  Comments/Distance (ft) 1: ~10ft x1, ~30ft x1, and ~40ft x1. NBOS. Slow jan. Forward flexed posture. Step through gait pattern. Increased WB on lateral surface of (L)foot and then intermittently NWB on (L)LE when c/o increased (L)foot pain. v/c and (A) for safer maneuvering of ww with 90/180° turns. No LOB or buckling of knees with  WB.  Transfers  Transfer: Yes  Transfer 1  Technique 1: Sit to stand, Stand to sit  Transfer Device 1: Gait belt (ww)  Transfer Level of Assistance 1: Minimum assistance, +2  Trials/Comments 1: (3x). v/c for safe hand placement and technique; inconsistent follow through, but receptive to cues for corrections. Benefits from elevated surfaces.          Outcome Measures:  [facility] Basic Mobility  Turning from your back to your side while in a flat bed without using bedrails: A little  Moving from lying on your back to sitting on the side of a flat bed without using bedrails: A little  Moving to and from bed to chair (including a wheelchair): A little  Standing up from a chair using your arms (e.g. wheelchair or bedside chair): A little  To walk in hospital room: A little  Climbing 3-5 steps with railing: A lot  Basic Mobility - Total Score: 17    Education Documentation  Mobility Training, taught by Apryl Donahue PTA at 3/21/2024  1:25 PM.  Learner: Significant Other, Patient  Readiness: Acceptance  Method: Explanation, Demonstration, Teach-back  Response: Verbalizes Understanding, Needs Reinforcement  Comment: See therapy note.    EDUCATION:  Individual(s) Educated: Patient, Significant Other  Education Provided: Body Mechanics, Fall Risk, Home Safety, POC, Posture (Safety with transfers/amb using ww properly for support; Gait belt provided at bedside for staff to use with patient for amb and for patient to take to next facility or home for safe mobilization.)  Patient Response to Education: Patient/Caregiver Verbalized Understanding of Information, Patient/Caregiver Performed Return Demonstration of Exercises/Activities, Patient/Caregiver Asked Appropriate Questions    Encounter Problems       Encounter Problems (Active)       PT Problem       Bed mobility independent  (Progressing)       Start:  03/18/24    Expected End:  04/15/24            Transfers sit <> stand with SPC CGA (Progressing)       Start:  03/18/24     Expected End:  04/15/24            Patient to ambulate with SPC 30' + 1 minimal assist (Progressing)       Start:  03/18/24    Expected End:  04/15/24            Patient to negotiate 10 steps with single handrail and cane + 1 minimal assist (Not Progressing)       Start:  03/18/24    Expected End:  04/15/24

## 2024-03-21 NOTE — CARE PLAN
The patient's goals for the shift include  discharge    The clinical goals for the shift include safety

## 2024-03-21 NOTE — PROGRESS NOTES
Per therapy pt. Doing much better today and no longer wants to go to [facility].  Met with pt. And girlfriend at bedside.  Pt. Up ambulating in room, states he is doing better and wants to go home with outpt. Therapy.  Physician  is aware and plans to discharge pt. Today.

## 2024-03-21 NOTE — CARE PLAN
The patient's goals for the shift include to get better    The clinical goals for the shift include safety    Problem: Fall/Injury  Goal: Not fall by end of shift  Outcome: Progressing     Problem: Fall/Injury  Goal: Be free from injury by end of the shift  Outcome: Progressing     Problem: Fall/Injury  Goal: Verbalize understanding of personal risk factors for fall in the hospital  Outcome: Progressing     Problem: Fall/Injury  Goal: Verbalize understanding of risk factor reduction measures to prevent injury from fall in the home  Outcome: Progressing

## 2024-03-21 NOTE — PROGRESS NOTES
ASSESSMENT & PLAN:     Acute ischemic stroke  Hx of TIA  -p/w acute onset of L sided weakness, L facial droop, dysarthria c/f TIA vs stroke. Has hx of TIA in the past, was noncompliant with medications. Smoked crack cocaine before symptom onset.   -CT brain noncon neg for acute findings. Declined TNK in ED.   -CTA H+N neg for LVO  -MRI brain was unremarkable, did not show any focus of acute stroke, however per neurology can still have small lacunar stroke missed by MRI  -TTE showed normal LVEF, but did show small PFO. DAY[facility]ARA confirmed small PFO. BLE Doppler neg DVT.   -A1C, lipid panel unremarkable  -still has neuro deficits c/w stroke syndrome  Plan:  -neuro following  -cardiology consulted, will fu recs re PFO  -cont ASA 81, Plavix, high intensity statin  -lovenox for vte ppx  -pt/ot rec high intensity, tcc looking into rehab options  -no driving or working x6w    HTN  -cont low dose amlodipine    Tobacco use disorder  Cocaine abuse  MJ use  -cessation education    Dispo: snf, pending precert  Vte ppx on lovenox    Vinicio Huffman MD    SUBJECTIVE     NAEON. No changes. Still has dysarthria, and mild L sided weakness.     OBJECTIVE:       Last Recorded Vitals:  Vitals:    03/20/24 1308 03/20/24 1347 03/20/24 2000 03/21/24 0000   BP: 132/90 104/54 140/89 114/61   BP Location:  Right arm Right arm Right arm   Patient Position:  Lying Lying Lying   Pulse: 65 61 66 76   Resp: 12 18 18 18   Temp:  36.2 °C (97.2 °F) 36.7 °C (98.1 °F) 36.8 °C (98.2 °F)   TempSrc:  Temporal Temporal Temporal   SpO2: 100% 94% 98% 98%   Weight:       Height:           Last I/O:  No intake/output data recorded.    Physical Exam:  GEN: appears stated age, NAD  CV: RRR, no m/r/g, no LE edema  LUNGS: CTAB, no w/r/c  ABD: soft, NT, ND, NBS  SKIN: no rashes  MSK; no gross deformities, normal joints  NEURO: alert, oriented x3, L facial droop, dysarthric, LUE 4/5, LLE 3/5 strength  PSYCH: appropriate mood, affect    Inpatient Medications:  amLODIPine,  2.5 mg, oral, Daily  aspirin, 81 mg, oral, Daily  atorvastatin, 80 mg, oral, Nightly  clopidogrel, 75 mg, oral, Daily  enoxaparin, 40 mg, subcutaneous, Daily        PRN Medications  PRN medications: acetaminophen, butamben-tetracaine-benzocaine, fentaNYL PF, ipratropium-albuteroL, lidocaine, midazolam, ondansetron, polyethylene glycol    Continuous Medications:         LABS AND IMAGING:     Labs:  Results for orders placed or performed during the hospital encounter of 03/18/24 (from the past 24 hour(s))   POCT GLUCOSE   Result Value Ref Range    POCT Glucose 102 (H) 74 - 99 mg/dL   Transesophageal Echo (DAYANARA)   Result Value Ref Range    BSA 2.2 m2   POCT GLUCOSE   Result Value Ref Range    POCT Glucose 160 (H) 74 - 99 mg/dL   POCT GLUCOSE   Result Value Ref Range    POCT Glucose 96 74 - 99 mg/dL   Magnesium   Result Value Ref Range    Magnesium 1.99 1.60 - 2.40 mg/dL   Basic Metabolic Panel   Result Value Ref Range    Glucose 133 (H) 74 - 99 mg/dL    Sodium 141 136 - 145 mmol/L    Potassium 3.5 3.5 - 5.3 mmol/L    Chloride 108 (H) 98 - 107 mmol/L    Bicarbonate 26 21 - 32 mmol/L    Anion Gap 11 10 - 20 mmol/L    Urea Nitrogen 10 6 - 23 mg/dL    Creatinine 0.80 0.50 - 1.30 mg/dL    eGFR >90 >60 mL/min/1.73m*2    Calcium 9.1 8.6 - 10.3 mg/dL   CBC   Result Value Ref Range    WBC 5.9 4.4 - 11.3 x10*3/uL    nRBC 0.0 0.0 - 0.0 /100 WBCs    RBC 5.32 4.50 - 5.90 x10*6/uL    Hemoglobin 13.6 13.5 - 17.5 g/dL    Hematocrit 43.6 41.0 - 52.0 %    MCV 82 80 - 100 fL    MCH 25.6 (L) 26.0 - 34.0 pg    MCHC 31.2 (L) 32.0 - 36.0 g/dL    RDW 15.3 (H) 11.5 - 14.5 %    Platelets 203 150 - 450 x10*3/uL   SST TOP   Result Value Ref Range    Extra Tube Hold for add-ons.    POCT GLUCOSE   Result Value Ref Range    POCT Glucose 104 (H) 74 - 99 mg/dL   ECG 12 Lead   Result Value Ref Range    Ventricular Rate 59 BPM    Atrial Rate 59 BPM    WA Interval 186 ms    QRS Duration 92 ms    QT Interval 392 ms    QTC Calculation(Bazett) 388 ms    P  Axis 53 degrees    R Axis 71 degrees    T Axis 37 degrees    QRS Count 10 beats    Q Onset 220 ms    P Onset 127 ms    P Offset 182 ms    T Offset 416 ms    QTC Fredericia 390 ms        Imaging:  ECG 12 Lead  Sinus bradycardia  ST elevation, consider early repolarization, pericarditis, or injury  Abnormal ECG  When compared with ECG of 20-MAR-2024 09:06,  No significant change was found

## 2024-03-21 NOTE — DISCHARGE SUMMARY
DISCHARGE DIAGNOSIS     Acute ischemic stroke  Hx of TIA  HTN  Tobacco use disorder  Cocaine abuse  MJ use    HOSPITAL COURSE AND DETAILS     Acute ischemic stroke  Hx of TIA  -p/w acute onset of L sided weakness, L facial droop, dysarthria c/f TIA vs stroke. Has hx of TIA in the past, was noncompliant with medications. Smoked crack cocaine before symptom onset.   -CT brain noncon neg for acute findings. Declined TNK in ED.   -CTA H+N neg for LVO  -MRI brain was unremarkable, did not show any focus of acute stroke, however per neurology can still have small lacunar stroke missed by MRI  -TTE showed normal LVEF, but did show small PFO. DAYANARA confirmed small PFO. BLE Doppler neg DVT.   -A1C, lipid panel unremarkable  -still has neuro deficits c/w stroke syndrome  -cont on ASA 81, Plavix, high intensity statin  -seen by PT/OT, rec for outpt PT  -no driving or working x6w  -fu with neurology, cardiology, PCP     HTN  -cont low dose amlodipine     Tobacco use disorder  Cocaine abuse  MJ use  -cessation education  -declined need for NRT    Stable for dc to home. Total time spent on discharge services 33 minutes.     DISCHARGE PHYSICAL EXAM     Last Recorded Vitals:  Vitals:    03/20/24 1308 03/20/24 1347 03/20/24 2000 03/21/24 0000   BP: 132/90 104/54 140/89 114/61   BP Location:  Right arm Right arm Right arm   Patient Position:  Lying Lying Lying   Pulse: 65 61 66 76   Resp: 12 18 18 18   Temp:  36.2 °C (97.2 °F) 36.7 °C (98.1 °F) 36.8 °C (98.2 °F)   TempSrc:  Temporal Temporal Temporal   SpO2: 100% 94% 98% 98%   Weight:       Height:           Physical Exam:  GEN: appears stated age, NAD  CV: RRR, no m/r/g, no LE edema  LUNGS: CTAB, no w/r/c  ABD: soft, NT, ND, NBS  SKIN: no rashes  MSK; no gross deformities, normal joints  NEURO: alert, oriented x3, L facial droop, dysarthric, LUE 4/5, LLE 3/5 strength  PSYCH: appropriate mood, affect      PERTINENT LABS AND IMAGING     Results for orders placed or performed during  the hospital encounter of 03/18/24 (from the past 96 hour(s))   ECG 12 lead   Result Value Ref Range    Ventricular Rate 122 BPM    Atrial Rate 182 BPM    QRS Duration 70 ms    QT Interval 424 ms    QTC Calculation(Bazett) 604 ms    R Axis 88 degrees    T Axis 38 degrees    QRS Count 20 beats    Q Onset 223 ms    T Offset 435 ms    QTC Fredericia 537 ms   CBC and Auto Differential   Result Value Ref Range    WBC 7.8 4.4 - 11.3 x10*3/uL    nRBC 0.0 0.0 - 0.0 /100 WBCs    RBC 5.94 (H) 4.50 - 5.90 x10*6/uL    Hemoglobin 15.5 13.5 - 17.5 g/dL    Hematocrit 48.3 41.0 - 52.0 %    MCV 81 80 - 100 fL    MCH 26.1 26.0 - 34.0 pg    MCHC 32.1 32.0 - 36.0 g/dL    RDW 15.5 (H) 11.5 - 14.5 %    Platelets 229 150 - 450 x10*3/uL    Neutrophils % 68.4 40.0 - 80.0 %    Immature Granulocytes %, Automated 0.3 0.0 - 0.9 %    Lymphocytes % 20.5 13.0 - 44.0 %    Monocytes % 9.9 2.0 - 10.0 %    Eosinophils % 0.4 0.0 - 6.0 %    Basophils % 0.5 0.0 - 2.0 %    Neutrophils Absolute 5.30 1.20 - 7.70 x10*3/uL    Immature Granulocytes Absolute, Automated 0.02 0.00 - 0.70 x10*3/uL    Lymphocytes Absolute 1.59 1.20 - 4.80 x10*3/uL    Monocytes Absolute 0.77 0.10 - 1.00 x10*3/uL    Eosinophils Absolute 0.03 0.00 - 0.70 x10*3/uL    Basophils Absolute 0.04 0.00 - 0.10 x10*3/uL   Comprehensive metabolic panel   Result Value Ref Range    Glucose 174 (H) 74 - 99 mg/dL    Sodium 138 136 - 145 mmol/L    Potassium 3.5 3.5 - 5.3 mmol/L    Chloride 101 98 - 107 mmol/L    Bicarbonate 26 21 - 32 mmol/L    Anion Gap 15 10 - 20 mmol/L    Urea Nitrogen 11 6 - 23 mg/dL    Creatinine 0.95 0.50 - 1.30 mg/dL    eGFR >90 >60 mL/min/1.73m*2    Calcium 9.8 8.6 - 10.3 mg/dL    Albumin 4.7 3.4 - 5.0 g/dL    Alkaline Phosphatase 60 33 - 120 U/L    Total Protein 7.6 6.4 - 8.2 g/dL    AST 15 9 - 39 U/L    Bilirubin, Total 0.4 0.0 - 1.2 mg/dL    ALT 18 10 - 52 U/L   Magnesium   Result Value Ref Range    Magnesium 1.99 1.60 - 2.40 mg/dL   Troponin I, High Sensitivity, Initial    Result Value Ref Range    Troponin I, High Sensitivity <3 0 - 20 ng/L   Troponin, High Sensitivity, 1 Hour   Result Value Ref Range    Troponin I, High Sensitivity 3 0 - 20 ng/L   Lipid Panel   Result Value Ref Range    Cholesterol 94 0 - 199 mg/dL    HDL-Cholesterol 31.2 mg/dL    Cholesterol/HDL Ratio 3.0     LDL Calculated 42 <=99 mg/dL    VLDL 21 0 - 40 mg/dL    Triglycerides 105 0 - 149 mg/dL    Non HDL Cholesterol 63 0 - 149 mg/dL   Hemoglobin A1C   Result Value Ref Range    Hemoglobin A1C 5.1 see below %    Estimated Average Glucose 100 Not Established mg/dL   TSH   Result Value Ref Range    Thyroid Stimulating Hormone 1.82 0.44 - 3.98 mIU/L   Drug Screen, Urine   Result Value Ref Range    Amphetamine Screen, Urine Presumptive Negative Presumptive Negative    Barbiturate Screen, Urine Presumptive Negative Presumptive Negative    Benzodiazepines Screen, Urine Presumptive Negative Presumptive Negative    Cannabinoid Screen, Urine Presumptive Positive (A) Presumptive Negative    Cocaine Metabolite Screen, Urine Presumptive Positive (A) Presumptive Negative    Fentanyl Screen, Urine Presumptive Negative Presumptive Negative    Opiate Screen, Urine Presumptive Negative Presumptive Negative    Oxycodone Screen, Urine Presumptive Negative Presumptive Negative    PCP Screen, Urine Presumptive Negative Presumptive Negative    Methadone Screen, Urine Presumptive Negative Presumptive Negative   POCT GLUCOSE   Result Value Ref Range    POCT Glucose 103 (H) 74 - 99 mg/dL   Transthoracic Echo (TTE) Complete   Result Value Ref Range    AV mn grad 4.0 mmHg    AV pk shelly 1.40 m/s    LV biplane EF 56 %    LVOT diam 2.10 cm    MV E/A ratio 1.45     Tricuspid annular plane systolic excursion 1.9 cm    MV avg E/e' ratio 8.51     LA vol index A/L 20.3 ml/m2    RV free wall pk S' 13.90 cm/s    LVIDd 4.48 cm    Aortic Valve Area by Continuity of Peak Velocity 2.77 cm2    AV pk grad 7.8 mmHg    Aortic Valve Area by Continuity of VTI  2.61 cm2    LV A4C EF 56.8    POCT GLUCOSE   Result Value Ref Range    POCT Glucose 122 (H) 74 - 99 mg/dL   POCT GLUCOSE   Result Value Ref Range    POCT Glucose 120 (H) 74 - 99 mg/dL   POCT GLUCOSE   Result Value Ref Range    POCT Glucose 90 74 - 99 mg/dL   POCT GLUCOSE   Result Value Ref Range    POCT Glucose 99 74 - 99 mg/dL   POCT GLUCOSE   Result Value Ref Range    POCT Glucose 133 (H) 74 - 99 mg/dL   POCT GLUCOSE   Result Value Ref Range    POCT Glucose 127 (H) 74 - 99 mg/dL   POCT GLUCOSE   Result Value Ref Range    POCT Glucose 116 (H) 74 - 99 mg/dL   ECG 12 Lead   Result Value Ref Range    Ventricular Rate 65 BPM    Atrial Rate 65 BPM    CT Interval 192 ms    QRS Duration 90 ms    QT Interval 382 ms    QTC Calculation(Bazett) 397 ms    P Axis 54 degrees    R Axis 72 degrees    T Axis 27 degrees    QRS Count 11 beats    Q Onset 225 ms    P Onset 129 ms    P Offset 181 ms    T Offset 416 ms    QTC Fredericia 392 ms   POCT GLUCOSE   Result Value Ref Range    POCT Glucose 102 (H) 74 - 99 mg/dL   Transesophageal Echo (DAYANARA)   Result Value Ref Range    BSA 2.2 m2   POCT GLUCOSE   Result Value Ref Range    POCT Glucose 160 (H) 74 - 99 mg/dL   POCT GLUCOSE   Result Value Ref Range    POCT Glucose 96 74 - 99 mg/dL   Magnesium   Result Value Ref Range    Magnesium 1.99 1.60 - 2.40 mg/dL   Basic Metabolic Panel   Result Value Ref Range    Glucose 133 (H) 74 - 99 mg/dL    Sodium 141 136 - 145 mmol/L    Potassium 3.5 3.5 - 5.3 mmol/L    Chloride 108 (H) 98 - 107 mmol/L    Bicarbonate 26 21 - 32 mmol/L    Anion Gap 11 10 - 20 mmol/L    Urea Nitrogen 10 6 - 23 mg/dL    Creatinine 0.80 0.50 - 1.30 mg/dL    eGFR >90 >60 mL/min/1.73m*2    Calcium 9.1 8.6 - 10.3 mg/dL   CBC   Result Value Ref Range    WBC 5.9 4.4 - 11.3 x10*3/uL    nRBC 0.0 0.0 - 0.0 /100 WBCs    RBC 5.32 4.50 - 5.90 x10*6/uL    Hemoglobin 13.6 13.5 - 17.5 g/dL    Hematocrit 43.6 41.0 - 52.0 %    MCV 82 80 - 100 fL    MCH 25.6 (L) 26.0 - 34.0 pg    MCHC 31.2  (L) 32.0 - 36.0 g/dL    RDW 15.3 (H) 11.5 - 14.5 %    Platelets 203 150 - 450 x10*3/uL   SST TOP   Result Value Ref Range    Extra Tube Hold for add-ons.    POCT GLUCOSE   Result Value Ref Range    POCT Glucose 104 (H) 74 - 99 mg/dL   ECG 12 Lead   Result Value Ref Range    Ventricular Rate 59 BPM    Atrial Rate 59 BPM    NV Interval 186 ms    QRS Duration 92 ms    QT Interval 392 ms    QTC Calculation(Bazett) 388 ms    P Axis 53 degrees    R Axis 71 degrees    T Axis 37 degrees    QRS Count 10 beats    Q Onset 220 ms    P Onset 127 ms    P Offset 182 ms    T Offset 416 ms    QTC Fredericia 390 ms   POCT GLUCOSE   Result Value Ref Range    POCT Glucose 111 (H) 74 - 99 mg/dL        Transesophageal Echo (DAYANARA)         Lower extremity venous duplex bilateral   Final Result   No sonographic evidence for deep vein thrombosis within the evaluated   veins of the bilateral lower extremities.        MACRO:   None        Signed by: Cosme Person 3/20/2024 6:11 AM   Dictation workstation:   [id number]      MR brain wo IV contrast   Final Result   Within limitations of some motion, unremarkable MRI of the brain. No   diffusion restriction abnormality is present to suggest an acute   infarct        MACRO:   None        Signed by: Delfin Warren 3/18/2024 9:32 PM   Dictation workstation:   [id number]      CT head wo IV contrast   Final Result   1.  No acute intracranial hemorrhage or acute territorial infarct. If   there is persistent clinical concern for acute ischemia, MRI can be   obtained for further evaluation.                       MACRO:   None.        Signed by: Madelaine Walker 3/18/2024 8:09 PM   Dictation workstation:   [id number]      Transthoracic Echo (TTE) Complete   Final Result      CT angio brain attack head w IV contrast and post procedure   Final Result   1. No evidence of an acute ischemic infarct, acute intracranial   hemorrhage, or mass effect.        2. Pseudoaneurysm of the distal extracranial  right ICA measuring 0.3   cm x 0.2 cm located 0.7 cm from the skull base. No associated   dissection flap, mural thickening, or significant stenosis.        3. The remainder of the CTA head and neck is unremarkable.             MACRO:   Yasmany Smalls discussed the significance and urgency of this   critical finding by NIKKIE ANGEL with  FARZANA MIX on 3/18/2024 at   3:19 am with confirmation of receipt .  (**-RCF-**) Findings:  See   findings.        Signed by: Yasmany Smalls 3/18/2024 3:19 AM   Dictation workstation:   [id number]      CT angio brain attack neck w IV contrast and post procedure   Final Result   1. No evidence of an acute ischemic infarct, acute intracranial   hemorrhage, or mass effect.        2. 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 No associated   dissection flap, mural thickening, or significant stenosis.        3. The remainder of the CTA head and neck is unremarkable.             MACRO:   Yasmany Smalls discussed the significance and urgency of this   critical finding by [facility]  FARZANA MIX on 3/18/2024 at   3:19 am with confirmation of receipt .  (**-RCF-**) Findings:  See   findings.        Signed by: Yasmany Smalls 3/18/2024 3:19 AM   Dictation workstation:   [id number]      CT brain attack head wo IV contrast   Final Result   1. No evidence of an acute ischemic infarct, acute intracranial   hemorrhage, or mass effect.        2. 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 No associated   dissection flap, mural thickening, or significant stenosis.        3. The remainder of the CTA head and neck is unremarkable.             MACRO:   Yasmany Smalls discussed the significance and urgency of this   critical finding by [facility]  FARZANA MIX on 3/18/2024 at   3:19 am with confirmation of receipt .  (**-RCF-**)  Findings:  See   findings.        Signed by: Yasmany Smalls 3/18/2024 3:19 AM   Dictation workstation:   [id number]      XR chest 1 view   Final Result   1.  No radiographic evidence of acute cardiopulmonary process.                  MACRO:   None.        Signed by: Madelaine Walker 3/18/2024 2:51 AM   Dictation workstation:   [id number]          Transthoracic Echo (TTE) Complete    Result Date: 3/18/2024          [place] [id number]  Tel [phone] Fax [phone] TRANSTHORACIC ECHOCARDIOGRAM REPORT  Patient Name:      TICO Herrera Physician:    [id number] Dustin Ramesh DO Study Date:        3/18/2024            Ordering Provider:    [id number] DOROTHY OLIVIA MRN/PID:           [id number]             Fellow: Accession#:        [id number]         Nurse:                Philip Quiñonez RN Date of Birth/Age: 1980 / 43 years Sonographer:          Shyann Kang                                                               RD Gender:            M                    Additional Staff: Height:            175.26 cm            Admit Date:           3/18/2024 Weight:            99.79 kg             Admission Status:     Inpatient -                                                               Routine BSA / BMI:         2.15 m2 / 32.49      Department Location:  [place] [address]                                      Echo Lab Blood Pressure: 149 /84 mmHg Study Type:    TRANSTHORACIC ECHO (TTE) COMPLETE Diagnosis/ICD: Transient cerebral ischemic attack, unspecified (NOT on                LCD)-G45.9 Indication:    Transischemic Attack CPT Codes:     Echo Complete w Full Doppler-[id number] Patient History: Pertinent History: Dyspnea, TIA and Polysubstance abuse. Study Detail: The following Echo studies were performed: 2D, M-Mode,  Doppler and               color flow. Agitated saline used as a contrast agent for               intraseptal flow evaluation. The patient was awake.  PHYSICIAN INTERPRETATION: Left Ventricle: Left ventricular systolic function is normal, with an estimated ejection fraction of 60%. There are no regional wall motion abnormalities. The left ventricular cavity size is normal. Spectral Doppler shows a normal pattern of left ventricular diastolic filling. LV Wall Scoring: All segments are normal. Left Atrium: The left atrium is mildly dilated. A bubble study using agitated saline was performed. Bubble study is positive. A small PFO (= 10 bubbles) was demonstrated. A bubble study shows that an intrapulmonary shunting is not present. Right Ventricle: The right ventricle is normal in size. There is normal right ventricular global systolic function. Right Atrium: The right atrium is normal in size. Aortic Valve: The aortic valve appears structurally normal. The aortic valve appears tricuspid. There is no evidence of aortic valve stenosis. There is no evidence of aortic valve regurgitation. The peak instantaneous gradient of the aortic valve is 7.8 mmHg. The mean gradient of the aortic valve is 4.0 mmHg. Mitral Valve: The mitral valve is normal in structure. There is no evidence of mitral valve stenosis. There is normal mitral valve leaflet mobility. There is no evidence of mitral valve regurgitation. Tricuspid Valve: The tricuspid valve is structurally normal. There is normal tricuspid valve leaflet mobility. There is trace tricuspid regurgitation. Pulmonic Valve: The pulmonic valve is structurally normal. There is no indication of pulmonic valve regurgitation. Pericardium: There is no pericardial effusion noted. Aorta: The aortic root is normal. Pulmonary Artery: The main pulmonary artery is normal in size, and position, with normal bifurcation into the left and right pulmonary arteries. Systemic Veins: The inferior vena  cava appears to be of normal size. In comparison to the previous echocardiogram(s): Prior examinations are available and were reviewed for comparison purposes. The left ventricular function is unchanged. The left ventricular diastolic function is normal.  CONCLUSIONS:  1. Left ventricular systolic function is normal with a 60% estimated ejection fraction.  2. There is no evidence of mitral valve stenosis.  3. No evidence of mitral valve regurgitation.  4. Trace tricuspid regurgitation is visualized.  5. Aortic valve stenosis is not present.  6. The main pulmonary artery is normal in size, and position, with normal bifurcation into the left and right pulmonary arteries.  7. A bubble study using agitated saline was performed. Bubble study is positive. A small PFO (= 10 bubbles) was demonstrated. QUANTITATIVE DATA SUMMARY: 2D MEASUREMENTS:                          Normal Ranges: Ao Root d:     3.10 cm   (2.0-3.7cm) LAs:           4.00 cm   (2.7-4.0cm) IVSd:          1.03 cm   (0.6-1.1cm) LVPWd:         0.98 cm   (0.6-1.1cm) LVIDd:         4.48 cm   (3.9-5.9cm) LVIDs:         2.98 cm LV Mass Index: 71.3 g/m2 LV % FS        33.5 % LA VOLUME:                               Normal Ranges: LA Vol A4C:        45.8 ml    (22+/-6mL/m2) LA Vol A2C:        40.9 ml LA Vol BP:         43.6 ml LA Vol Index A4C:  21.3ml/m2 LA Vol Index A2C:  19.0 ml/m2 LA Vol Index BP:   20.3 ml/m2 LA Area A4C:       17.3 cm2 LA Area A2C:       16.2 cm2 LA Major Axis A4C: 5.6 cm LA Major Axis A2C: 5.5 cm LA Volume Index:   19.2 ml/m2 RA VOLUME BY A/L METHOD:                               Normal Ranges: RA Vol A4C:        34.1 ml    (8.3-19.5ml) RA Vol Index A4C:  15.8 ml/m2 RA Area A4C:       15.1 cm2 RA Major Axis A4C: 5.7 cm AORTA MEASUREMENTS:                    Normal Ranges: Asc Ao, d: 3.00 cm (2.1-3.4cm) LV SYSTOLIC FUNCTION BY 2D PLANIMETRY (MOD):                     Normal Ranges: EF-A4C View: 56.8 % (>=55%) EF-A2C View: 60.2 % EF-Biplane:   55.9 % LV DIASTOLIC FUNCTION:                        Normal Ranges: MV Peak E:    0.93 m/s (0.7-1.2 m/s) MV Peak A:    0.64 m/s (0.42-0.7 m/s) E/A Ratio:    1.45     (1.0-2.2) MV e'         0.11 m/s (>8.0) MV lateral e' 0.11 m/s MV medial e'  0.11 m/s E/e' Ratio:   8.51     (<8.0) MITRAL VALVE:                 Normal Ranges: MV DT: 181 msec (150-240msec) AORTIC VALVE:                                   Normal Ranges: AoV Vmax:                1.40 m/s (<=1.7m/s) AoV Peak P.8 mmHg (<20mmHg) AoV Mean P.0 mmHg (1.7-11.5mmHg) LVOT Max Patricio:            1.12 m/s (<=1.1m/s) AoV VTI:                 30.70 cm (18-25cm) LVOT VTI:                23.10 cm LVOT Diameter:           2.10 cm  (1.8-2.4cm) AoV Area, VTI:           2.61 cm2 (2.5-5.5cm2) AoV Area,Vmax:           2.77 cm2 (2.5-4.5cm2) AoV Dimensionless Index: 0.75  RIGHT VENTRICLE: RV Basal 3.50 cm RV Mid   2.47 cm RV Major 7.4 cm TAPSE:   18.7 mm RV s'    0.14 m/s TRICUSPID VALVE/RVSP:                   Normal Ranges: IVC Diam: 1.75 cm PULMONIC VALVE:                         Normal Ranges: PV Accel Time: 185 msec (>120ms) PV Max Patricio:    0.8 m/s  (0.6-0.9m/s) PV Max P.7 mmHg  [id number] Dustin Ramesh DO Electronically signed on 3/18/2024 at 7:55:30 PM  Wall Scoring  ** Final **      DISCHARGE MEDICATIONS        Your medication list        START taking these medications        Instructions Last Dose Given Next Dose Due   amLODIPine 2.5 mg tablet  Commonly known as: Norvasc  Start taking on: 2024      Take 1 tablet (2.5 mg) by mouth once daily. Do not start before 2024.       aspirin 81 mg EC tablet  Start taking on: 2024      Take 1 tablet (81 mg) by mouth once daily. Do not start before 2024.       atorvastatin 80 mg tablet  Commonly known as: Lipitor      Take 1 tablet (80 mg) by mouth once daily at bedtime.       clopidogrel 75 mg tablet  Commonly known as: Plavix  Start taking on:   2024      Take 1 tablet (75 mg) by mouth once daily. Do not start before March 22, 2024.                 Where to Get Your Medications        These medications were sent to [id number] - [place], [place] - [address] [address]      Phone: [phone]   amLODIPine 2.5 mg tablet  aspirin 81 mg EC tablet  atorvastatin 80 mg tablet  clopidogrel 75 mg tablet         OUTPATIENT FOLLOW-UP     No future appointments.

## 2024-03-21 NOTE — PROGRESS NOTES
03/21/24 1213   Current Planned Discharge Disposition   Current Planned Discharge Disposition Home     Patient prefers home, will do outpatient therapy. Plan now is home, s/o at bedside and in agreement with plan also.

## 2024-03-21 NOTE — PROGRESS NOTES
Occupational Therapy    OT Treatment    Patient Name: Jean-Pierre Stubbs  MRN: [id number]  Today's Date: 3/21/2024  Time Calculation  Start Time: 1034  Stop Time: 1115  Time Calculation (min): 41 min         Assessment:  End of Session Communication: Bedside nurse, Care Coordinator  End of Session Patient Position: Bed, 2 rail up, Alarm on       Previous Visit Info:  OT Last Visit  OT Received On: 03/21/24  General:  General  Family/Caregiver Present: Yes  Co-Treatment Reason: cotreat with PT to increase pt safety and indep with  functionall transfers/ADLs  Prior to Session Communication: Bedside nurse  Patient Position Received: Bed, 3 rail up, Alarm off, not on at start of session  General Comment: pt agreeable to OT tx  Precautions:  Medical Precautions: Fall precautions    Pain:  Pain Assessment  Pain Assessment:  (pt states L LE still tingles)    Objective    Cognition:  Cognition  Overall Cognitive Status: Within Functional Limits  Impulsive: Mildly       Activities of Daily Living: Grooming  Grooming Comments: pt required CGA for maintaining upright balance while standing at sink engaging in grooming tasks    LE Dressing  LE Dressing:  (pt donning B socks seated EOB with sBA)  Functional Standing Tolerance:     Bed Mobility/Transfers: Transfers  Transfer:  (pt required min A x 2 for functional transfers with use of fWW, pt able wo WB more on L LE this date. pt stating it still feels like pins and needles but its getting better. pt educated on safety awareness when performing functional transfers)      Outcome Measures:[facility] Daily Activity  Putting on and taking off regular lower body clothing: A little  Bathing (including washing, rinsing, drying): A little  Putting on and taking off regular upper body clothing: A little  Toileting, which includes using toilet, bedpan or urinal: A little  Taking care of personal grooming such as brushing teeth: A little  Eating Meals: None  Daily Activity - Total Score:  19        Education Documentation  ADL Training, taught by LENA Andrew at 3/21/2024  1:59 PM.  Learner: Significant Other, Patient  Readiness: Acceptance  Method: Explanation  Response: Needs Reinforcement  Comment: pt educated on importance of OOB activitiy to increase indep. pt and girlfriend educated on d/c planning    Education Comments  No comments found.        OP EDUCATION:       Goals:  Encounter Problems       Encounter Problems (Active)       ADLs       Pt will dress UB with modified indep (Progressing)       Start:  03/18/24    Expected End:  04/01/24            Pt will complete grooming tasks with SBA after set up (Progressing)       Start:  03/18/24    Expected End:  04/01/24               EXERCISE/STRENGTHENING       Pt will participate in neuro re-ed to faciliate normalized tone and functional use of L UE (Progressing)       Start:  03/18/24    Expected End:  04/01/24               TRANSFERS       Pt will transfer to bed, chair, toielt with Erin x 1 (Progressing)       Start:  03/18/24    Expected End:  04/01/24

## 2024-03-21 NOTE — PROGRESS NOTES
[facility] Heart Progress Note           Rounding ISMAEL/Cardiologist:  Johnson Perez, APRN-CNP,   Primary Cardiologist: Dr. Dustin Juarez    Date:  3/21/2024  Patient:  Jean-Pierre Stubbs  YOB: 1980  MRN:  [id number]   Admit Date:  3/18/2024      SUBJECTIVE:    3/21/24   patient's resting quietly denies chest pain or shortness of breath.  I spoke to him at length he get that denied acute rehab he insist on going home and doing home physical therapy.  I did discuss with him the importance of the baby aspirin and the Plavix along with the cholesterol medicine along with lifestyle changes I answered all of his questions   EKG is sinus bradycardia with LVH early repolarization    3/20/24  Jean-Pierre Stubbs is a 43 y.o.  male patient who is being at the request of Dr. Huffman for inpatient consultation of  CVA possible PFO . He was admitted on 3/18/2024.  Previous [facility] and [facility] records have been reviewed in detail.   Patient with a history of hypertension, anxiety, substance abuse came into the emergency department on Sunday was brought in by his significant other.  He states that he was smoking weed then he started smoking cocaine he developed this weird sensation on his left side when she was driving a man he developed slurred speech with facial droop on the left side with left leg weakness and left arm weakness.  Seen and evaluated by neurology.  They did do an echo that showed a small PFO they asked cardiology to get involved with his case.  He still does have some slurred speech.  3 out of 5 hand grasp and foot pumps on the left side 5 out of 5 hand grasp and foot pumps on the right side he does have a left-sided facial droop.  He denies having any chest pain, fever, chills, nausea, vomiting, PND, orthopnea, claudication  EKG done on 3/18/2024 sinus tachycardia  Telemetry ordered     Cardiac history  None      VITALS:     Vitals:    03/20/24 1308 03/20/24 1347 03/20/24 2000 03/21/24 0000    BP: 132/90 104/54 140/89 114/61   BP Location:  Right arm Right arm Right arm   Patient Position:  Lying Lying Lying   Pulse: 65 61 66 76   Resp: 12 18 18 18   Temp:  36.2 °C (97.2 °F) 36.7 °C (98.1 °F) 36.8 °C (98.2 °F)   TempSrc:  Temporal Temporal Temporal   SpO2: 100% 94% 98% 98%   Weight:       Height:           Intake/Output Summary (Last 24 hours) at 3/21/2024 1104  Last data filed at 3/20/2024 1331  Gross per 24 hour   Intake --   Output 0 ml   Net 0 ml       Wt Readings from Last 4 Encounters:   03/18/24 99.8 kg (220 lb)   02/13/24 95.3 kg (210 lb)   12/06/23 95 kg (209 lb 7 oz)       CURRENT HOSPITAL MEDICATIONS:   amLODIPine, 2.5 mg, oral, Daily  aspirin, 81 mg, oral, Daily  atorvastatin, 80 mg, oral, Nightly  clopidogrel, 75 mg, oral, Daily  enoxaparin, 40 mg, subcutaneous, Daily         Current Outpatient Medications   Medication Instructions    amLODIPine (NORVASC) 5 mg, oral, Daily        PHYSICAL EXAMINATION:   .GENERAL APPEARANCE: Well developed, well nourished, in no acute distress.  Left-sided facial droop  CHEST: Symmetric and non-tender.  INTEGUMENT: Skin warm and dry, without gross excoriationis or lesions.  HEENT: No gross abnormalities of conjunctiva, teeth, gums, oral mucosa left-sided facial droop  NECK: Supple, no JVD, no bruit. Thyroid not palpable. Carotid upstrokes normal.  NEURO/PSHCY: Alert and oriented x3; left-sided weakness and garbled speech  LUNGS: Clear to auscultation bilaterally; normal respiratory effort.  HEART: Rate and rhythm regular with no evident murmur; no gallop appreciated. There are no rubs, clicks or heaves. PMI nondisplaced.  ABDOMEN: Soft, nontender, no palpable hepatosplenomegaly, no mases, no bruits. Abdominal aorta not noted to be enlarged.  MUSCULOSKELETAL:  gait is unsteady left side leg weakness  EXTREMITIES: Warm with good color, no clubbing or cyanois. There is no edema noted.  PERIPHERAL VASCULAR: 2+ peripheral pulses      LAB DATA:     CBC:   Results  from last 7 days   Lab Units 03/21/24  0540 03/18/24  0214   WBC AUTO x10*3/uL 5.9 7.8   RBC AUTO x10*6/uL 5.32 5.94*   HEMOGLOBIN g/dL 13.6 15.5   HEMATOCRIT % 43.6 48.3   MCV fL 82 81   MCH pg 25.6* 26.1   MCHC g/dL 31.2* 32.1   RDW % 15.3* 15.5*   PLATELETS AUTO x10*3/uL 203 229     CMP:    Results from last 7 days   Lab Units 03/21/24  0540 03/18/24  0214   SODIUM mmol/L 141 138   POTASSIUM mmol/L 3.5 3.5   CHLORIDE mmol/L 108* 101   CO2 mmol/L 26 26   BUN mg/dL 10 11   CREATININE mg/dL 0.80 0.95   GLUCOSE mg/dL 133* 174*   PROTEIN TOTAL g/dL  --  7.6   CALCIUM mg/dL 9.1 9.8   BILIRUBIN TOTAL mg/dL  --  0.4   ALK PHOS U/L  --  60   AST U/L  --  15   ALT U/L  --  18     BMP:    Results from last 7 days   Lab Units 03/21/24  0540 03/18/24  0214   SODIUM mmol/L 141 138   POTASSIUM mmol/L 3.5 3.5   CHLORIDE mmol/L 108* 101   CO2 mmol/L 26 26   BUN mg/dL 10 11   CREATININE mg/dL 0.80 0.95   CALCIUM mg/dL 9.1 9.8   GLUCOSE mg/dL 133* 174*     Magnesium:  Results from last 7 days   Lab Units 03/21/24  0540 03/18/24  0214   MAGNESIUM mg/dL 1.99 1.99     Troponin:    Results from last 7 days   Lab Units 03/18/24  0333 03/18/24  0214   TROPHS ng/L 3 <3     BNP:     Lipid Panel:  Results from last 7 days   Lab Units 03/18/24  0638   HDL mg/dL 31.2   CHOLESTEROL/HDL RATIO  3.0   VLDL mg/dL 21   TRIGLYCERIDES mg/dL 105   NON HDL CHOL. mg/dL 63        DIAGNOSTIC TESTING:   @No results found for this or any previous visit.    ECG 12 Lead    Result Date: 3/21/2024  Sinus bradycardia ST elevation, consider early repolarization, pericarditis, or injury Abnormal ECG When compared with ECG of 20-MAR-2024 09:06, No significant change was found       Transesophageal Echo ([facility]ANARA)         Lower extremity venous duplex bilateral   Final Result   No sonographic evidence for deep vein thrombosis within the evaluated   veins of the bilateral lower extremities.        MACRO:   None        Signed by: Cosme Person 3/20/2024 6:11 AM   Dictation  workstation:   [id number]      MR brain wo IV contrast   Final Result   Within limitations of some motion, unremarkable MRI of the brain. No   diffusion restriction abnormality is present to suggest an acute   infarct        MACRO:   None        Signed by: Delfin Warren 3/18/2024 9:32 PM   Dictation workstation:   [id number]      CT head wo IV contrast   Final Result   1.  No acute intracranial hemorrhage or acute territorial infarct. If   there is persistent clinical concern for acute ischemia, MRI can be   obtained for further evaluation.                       MACRO:   None.        Signed by: Madelaine Walker 3/18/2024 8:09 PM   Dictation workstation:   [id number]      Transthoracic Echo (TTE) Complete   Final Result      CT angio brain attack head w IV contrast and post procedure   Final Result   1. No evidence of an acute ischemic infarct, acute intracranial   hemorrhage, or mass effect.        2. 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 No associated   dissection flap, mural thickening, or significant stenosis.        3. The remainder of the CTA head and neck is unremarkable.             MACRO:   Yasmany Smalls discussed the significance and urgency of this   critical finding by NIKKIE ANGEL with  FARZANA MARIA DEL ROSARIO on 3/18/2024 at   3:19 am with confirmation of receipt .  (**-RCF-**) Findings:  See   findings.        Signed by: Yasmany Smalls 3/18/2024 3:19 AM   Dictation workstation:   [id number]      CT angio brain attack neck w IV contrast and post procedure   Final Result   1. No evidence of an acute ischemic infarct, acute intracranial   hemorrhage, or mass effect.        2. 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 No associated   dissection flap, mural thickening, or significant stenosis.        3. The remainder of the CTA head and neck is  unremarkable.             MACRO:   Yasmany Smalls discussed the significance and urgency of this   critical finding by [facility] with  FARZANA MIX on 3/18/2024 at   3:19 am with confirmation of receipt .  (**-RCF-**) Findings:  See   findings.        Signed by: Yasmany Smalls 3/18/2024 3:19 AM   Dictation workstation:   [id number]      CT brain attack head wo IV contrast   Final Result   1. No evidence of an acute ischemic infarct, acute intracranial   hemorrhage, or mass effect.        2. 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 No associated   dissection flap, mural thickening, or significant stenosis.        3. The remainder of the CTA head and neck is unremarkable.             MACRO:   Yasmany Smalls discussed the significance and urgency of this   critical finding by [facility] STANLEY with  FARZANA MIX on 3/18/2024 at   3:19 am with confirmation of receipt .  (**-RCF-**) Findings:  See   findings.        Signed by: Yasmany Smalls 3/18/2024 3:19 AM   Dictation workstation:   [id number]      XR chest 1 view   Final Result   1.  No radiographic evidence of acute cardiopulmonary process.                  MACRO:   None.        Signed by: Madelaine Walker 3/18/2024 2:51 AM   Dictation workstation:   [id number]          Transthoracic Echo (TTE) Complete    Result Date: 3/18/2024          [place] [address]  Tel [phone] Fax [phone] TRANSTHORACIC ECHOCARDIOGRAM REPORT  Patient Name:      TICO Herrera Physician:    [id number] Dustin Ramesh DO Study Date:        3/18/2024            Ordering Provider:    [id number] DOROTHY OLIVIA MRN/PID:           [id number]             Fellow: Accession#:        [id number]         Nurse:                Philip Quiñonez RN Date of  Birth/Age: 1980 / 43 years Sonographer:          Shyann Kang                                                               RDCS Gender:            M                    Additional Staff: Height:            175.26 cm            Admit Date:           3/18/2024 Weight:            99.79 kg             Admission Status:     Inpatient -                                                               Routine BSA / BMI:         2.15 m2 / 32.49      Department Location:  [facility]                    kg/m2                                      Echo Lab Blood Pressure: 149 /84 mmHg Study Type:    TRANSTHORACIC ECHO (TTE) COMPLETE Diagnosis/ICD: Transient cerebral ischemic attack, unspecified (NOT on                LCD)-G45.9 Indication:    Transischemic Attack CPT Codes:     Echo Complete w Full [id number] Patient History: Pertinent History: Dyspnea, TIA and Polysubstance abuse. Study Detail: The following Echo studies were performed: 2D, M-Mode, Doppler and               color flow. Agitated saline used as a contrast agent for               intraseptal flow evaluation. The patient was awake.  PHYSICIAN INTERPRETATION: Left Ventricle: Left ventricular systolic function is normal, with an estimated ejection fraction of 60%. There are no regional wall motion abnormalities. The left ventricular cavity size is normal. Spectral Doppler shows a normal pattern of left ventricular diastolic filling. LV Wall Scoring: All segments are normal. Left Atrium: The left atrium is mildly dilated. A bubble study using agitated saline was performed. Bubble study is positive. A small PFO (= 10 bubbles) was demonstrated. A bubble study shows that an intrapulmonary shunting is not present. Right Ventricle: The right ventricle is normal in size. There is normal right ventricular global systolic function. Right Atrium: The right atrium is normal in size. Aortic Valve: The aortic valve appears structurally normal. The aortic valve  appears tricuspid. There is no evidence of aortic valve stenosis. There is no evidence of aortic valve regurgitation. The peak instantaneous gradient of the aortic valve is 7.8 mmHg. The mean gradient of the aortic valve is 4.0 mmHg. Mitral Valve: The mitral valve is normal in structure. There is no evidence of mitral valve stenosis. There is normal mitral valve leaflet mobility. There is no evidence of mitral valve regurgitation. Tricuspid Valve: The tricuspid valve is structurally normal. There is normal tricuspid valve leaflet mobility. There is trace tricuspid regurgitation. Pulmonic Valve: The pulmonic valve is structurally normal. There is no indication of pulmonic valve regurgitation. Pericardium: There is no pericardial effusion noted. Aorta: The aortic root is normal. Pulmonary Artery: The main pulmonary artery is normal in size, and position, with normal bifurcation into the left and right pulmonary arteries. Systemic Veins: The inferior vena cava appears to be of normal size. In comparison to the previous echocardiogram(s): Prior examinations are available and were reviewed for comparison purposes. The left ventricular function is unchanged. The left ventricular diastolic function is normal.  CONCLUSIONS:  1. Left ventricular systolic function is normal with a 60% estimated ejection fraction.  2. There is no evidence of mitral valve stenosis.  3. No evidence of mitral valve regurgitation.  4. Trace tricuspid regurgitation is visualized.  5. Aortic valve stenosis is not present.  6. The main pulmonary artery is normal in size, and position, with normal bifurcation into the left and right pulmonary arteries.  7. A bubble study using agitated saline was performed. Bubble study is positive. A small PFO (= 10 bubbles) was demonstrated. QUANTITATIVE DATA SUMMARY: 2D MEASUREMENTS:                          Normal Ranges: Ao Root d:     3.10 cm   (2.0-3.7cm) LAs:           4.00 cm   (2.7-4.0cm) IVSd:          1.03  cm   (0.6-1.1cm) LVPWd:         0.98 cm   (0.6-1.1cm) LVIDd:         4.48 cm   (3.9-5.9cm) LVIDs:         2.98 cm LV Mass Index: 71.3 g/m2 LV % FS        33.5 % LA VOLUME:                               Normal Ranges: LA Vol A4C:        45.8 ml    (22+/-6mL/m2) LA Vol A2C:        40.9 ml LA Vol BP:         43.6 ml LA Vol Index A4C:  21.3ml/m2 LA Vol Index A2C:  19.0 ml/m2 LA Vol Index BP:   20.3 ml/m2 LA Area A4C:       17.3 cm2 LA Area A2C:       16.2 cm2 LA Major Axis A4C: 5.6 cm LA Major Axis A2C: 5.5 cm LA Volume Index:   19.2 ml/m2 RA VOLUME BY A/L METHOD:                               Normal Ranges: RA Vol A4C:        34.1 ml    (8.3-19.5ml) RA Vol Index A4C:  15.8 ml/m2 RA Area A4C:       15.1 cm2 RA Major Axis A4C: 5.7 cm AORTA MEASUREMENTS:                    Normal Ranges: Asc Ao, d: 3.00 cm (2.1-3.4cm) LV SYSTOLIC FUNCTION BY 2D PLANIMETRY (MOD):                     Normal Ranges: EF-A4C View: 56.8 % (>=55%) EF-A2C View: 60.2 % EF-Biplane:  55.9 % LV DIASTOLIC FUNCTION:                        Normal Ranges: MV Peak E:    0.93 m/s (0.7-1.2 m/s) MV Peak A:    0.64 m/s (0.42-0.7 m/s) E/A Ratio:    1.45     (1.0-2.2) MV e'         0.11 m/s (>8.0) MV lateral e' 0.11 m/s MV medial e'  0.11 m/s E/e' Ratio:   8.51     (<8.0) MITRAL VALVE:                 Normal Ranges: MV DT: 181 msec (150-240msec) AORTIC VALVE:                                   Normal Ranges: AoV Vmax:                1.40 m/s (<=1.7m/s) AoV Peak P.8 mmHg (<20mmHg) AoV Mean P.0 mmHg (1.7-11.5mmHg) LVOT Max Patricio:            1.12 m/s (<=1.1m/s) AoV VTI:                 30.70 cm (18-25cm) LVOT VTI:                23.10 cm LVOT Diameter:           2.10 cm  (1.8-2.4cm) AoV Area, VTI:           2.61 cm2 (2.5-5.5cm2) AoV Area,Vmax:           2.77 cm2 (2.5-4.5cm2) AoV Dimensionless Index: 0.75  RIGHT VENTRICLE: RV Basal 3.50 cm RV Mid   2.47 cm RV Major 7.4 cm TAPSE:   18.7 mm RV s'    0.14 m/s TRICUSPID VALVE/RVSP:                    Normal Ranges: IVC Diam: 1.75 cm PULMONIC VALVE:                         Normal Ranges: PV Accel Time: 185 msec (>120ms) PV Max Patricio:    0.8 m/s  (0.6-0.9m/s) PV Max P.7 mmHg  [id number] Dustin Ramesh  Electronically signed on 3/18/2024 at 7:55:30 PM  Wall Scoring  ** Final **      RADIOLOGY:     Transesophageal Echo ([facility]ANARA)         Lower extremity venous duplex bilateral   Final Result   No sonographic evidence for deep vein thrombosis within the evaluated   veins of the bilateral lower extremities.        MACRO:   None        Signed by: Cosem Person 3/20/2024 6:11 AM   Dictation workstation:   [id number]      MR brain wo IV contrast   Final Result   Within limitations of some motion, unremarkable MRI of the brain. No   diffusion restriction abnormality is present to suggest an acute   infarct        MACRO:   None        Signed by: Delfin Warren 3/18/2024 9:32 PM   Dictation workstation:   [id number]      CT head wo IV contrast   Final Result   1.  No acute intracranial hemorrhage or acute territorial infarct. If   there is persistent clinical concern for acute ischemia, MRI can be   obtained for further evaluation.                       MACRO:   None.        Signed by: Madelaine Walker 3/18/2024 8:09 PM   Dictation workstation:   [id number]      Transthoracic Echo (TTE) Complete   Final Result      CT angio brain attack head w IV contrast and post procedure   Final Result   1. No evidence of an acute ischemic infarct, acute intracranial   hemorrhage, or mass effect.        2. 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 No associated   dissection flap, mural thickening, or significant stenosis.        3. The remainder of the CTA head and neck is unremarkable.             MACRO:   Yasmany Smalls discussed the significance and urgency of this   critical finding by NIKKIE ANGEL with  FARZANA MIX on 3/18/2024 at   3:19 am  with confirmation of receipt .  (**-RCF-**) Findings:  See   findings.        Signed by: Yasmany Smalls 3/18/2024 3:19 AM   Dictation workstation:   [id number]      CT angio brain attack neck w IV contrast and post procedure   Final Result   1. No evidence of an acute ischemic infarct, acute intracranial   hemorrhage, or mass effect.        2. 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 No associated   dissection flap, mural thickening, or significant stenosis.        3. The remainder of the CTA head and neck is unremarkable.             MACRO:   Yasmany Smalls discussed the significance and urgency of this   critical finding by NIKKIE ANGEL with  FARZANA MARIA DEL ROSARIO on 3/18/2024 at   3:19 am with confirmation of receipt .  (**-RCF-**) Findings:  See   findings.        Signed by: Yasmany Smalls 3/18/2024 3:19 AM   Dictation workstation:   [id number]      CT brain attack head wo IV contrast   Final Result   1. No evidence of an acute ischemic infarct, acute intracranial   hemorrhage, or mass effect.        2. 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 No associated   dissection flap, mural thickening, or significant stenosis.        3. The remainder of the CTA head and neck is unremarkable.             MACRO:   Yasmany Smalls discussed the significance and urgency of this   critical finding by NIKKIE ANGEL with  FARZANAMORIAH AMBRIZMIX on 3/18/2024 at   3:19 am with confirmation of receipt .  (**-RCF-**) Findings:  See   findings.        Signed by: Yasmany Smalls 3/18/2024 3:19 AM   Dictation workstation:   [id number]      XR chest 1 view   Final Result   1.  No radiographic evidence of acute cardiopulmonary process.                  MACRO:   None.        Signed by: Madelaine Walker 3/18/2024 2:51 AM   Dictation workstation:   [id number]          PROBLEM LIST     Patient Active  Problem List   Diagnosis    TIA (transient ischemic attack)    Anxiety and depression    Tobacco use disorder    OD (overdose of drug), intentional self-harm, sequela (CMS/[facility])    Drug overdose, intentional self-harm, initial encounter (CMS/[facility])    Stroke-like symptoms    Left-sided weakness    Dysarthria       ASSESSMENT:    acute ischemic stroke   small PFO   Hypertension   Dyslipidemia   substance abuse        PLAN:   Okay to  discharge home home physical to see   aspirin 81 mg daily daily   Plavix 75 mg daily   Lipitor 20 mg daily   follow-up with Dr. Juarez in 2 weeks   spoke at length with patient and family absolutely no drug use    Jack Perez CNP  [facility]      Of note, this documentation is completed using the Dragon Dictation system (voice recognition software). There may be spelling and/or grammatical errors that were not corrected prior to final submission.    Please do not hesitate to call with questions.  Electronically signed by MARIANNE Peacock, on 3/21/2024 at 11:04 AM

## 2024-03-22 ENCOUNTER — PATIENT OUTREACH (OUTPATIENT)
Dept: PRIMARY CARE | Facility: CLINIC | Age: 44
End: 2024-03-22
Payer: COMMERCIAL

## 2024-03-22 DIAGNOSIS — Q21.12 PFO (PATENT FORAMEN OVALE) (HHS-HCC): ICD-10-CM

## 2024-03-22 DIAGNOSIS — I63.311 CEREBROVASCULAR ACCIDENT (CVA) DUE TO THROMBOSIS OF RIGHT MIDDLE CEREBRAL ARTERY (MULTI): ICD-10-CM

## 2024-03-22 NOTE — PROGRESS NOTES
Discharge Facility:  [facility]    Discharge Diagnosis:  Cerebrovascular accident (CVA) due to thrombosis of right middle cerebral artery (CMS/HCC)   PFO (patent foramen ovale)    Admission Date:3/18/24  Discharge Date: 3/21/24    PCP Appointment Date: TBD- pt is going to call his insurance company to get referral for PCP     Specialist Appointment Date:   4/17/2024 12:15 PM    CARDIOLOGY HOSP DISCHARGE   [id number] ([place])   Dustin Juarez, DO     TBD Neurology  Schedule an appointment with Sugey Tabares CNP as soon as possible for a visit in 4 week(s) [phone]    Hospital Encounter and Summary: Linked   See discharge assessment below for further details  Engagement  Call Start Time: 1125 (3/22/2024 11:24 AM)    Medications  Medications reviewed with patient/caregiver?: Yes (3/22/2024 11:24 AM)  Is the patient having any side effects they believe may be caused by any medication additions or changes?: No (3/22/2024 11:24 AM)  Does the patient have all medications ordered at discharge?: Yes ([facility] - [place] - [address] [place] [address]) (3/22/2024 11:24 AM)  Care Management Interventions: Provided patient education (3/22/2024 11:24 AM)  Prescription Comments: START taking:  amLODIPine (Norvasc)   Start taking on: March 22, 2024  aspirin  Start taking on: March 22, 2024  atorvastatin (Lipitor)   clopidogrel (Plavix)   Start taking on: March 22, 2024 (3/22/2024 11:24 AM)  Is the patient taking all medications as directed (includes completed medication regime)?: Yes (3/22/2024 11:24 AM)  Medication Comments: CM discussed referencing discharge paperwork to follow detailed daily medication schedule. (3/22/2024 11:24 AM)    Appointments  Does the patient have a primary care provider?: No (3/22/2024 11:24 AM)  Care Management Interventions: Educated patient on importance of making appointment (3/22/2024 11:24 AM)  Has the patient kept scheduled appointments due by today?: Yes  (3/22/2024 11:24 AM)  Care Management Interventions: Advised to schedule with specialist; Educated on importance of keeping appointment (reviewed cardio appt details and also encouraged to call for neurology -reviewed # is on first page of summary) (3/22/2024 11:24 AM)    Self Management  Has home health visited the patient within 72 hours of discharge?: Not applicable (3/22/2024 11:24 AM)  What Durable Medical Equipment (DME) was ordered?: n/a (3/22/2024 11:24 AM)    Patient Teaching  Does the patient have access to their discharge instructions?: Yes (3/22/2024 11:24 AM)  Care Management Interventions: Reviewed instructions with patient (3/22/2024 11:24 AM)  What is the patient's perception of their health status since discharge?: Improving (3/22/2024 11:24 AM)  Is the patient/caregiver able to teach back the hierarchy of who to call/visit for symptoms/problems? PCP, Specialist, Home Health nurse, Urgent Care, ED, 911: Yes (3/22/2024 11:24 AM)    Wrap Up  Wrap Up Additional Comments: I introduced myself and the TCM program to Jean-Pierre Stubbs. Reviewed hospital stay and answered any questions. I gave my contact information and encouraged to call me if needing assistance or has any further questions prior to my next outreach. (3/22/2024 11:24 AM)  Call End Time: 1129 (3/22/2024 11:24 AM)

## 2024-03-24 LAB
ATRIAL RATE: 59 BPM
P AXIS: 53 DEGREES
P OFFSET: 182 MS
P ONSET: 127 MS
PR INTERVAL: 186 MS
Q ONSET: 220 MS
QRS COUNT: 10 BEATS
QRS DURATION: 92 MS
QT INTERVAL: 392 MS
QTC CALCULATION(BAZETT): 388 MS
QTC FREDERICIA: 390 MS
R AXIS: 71 DEGREES
T AXIS: 37 DEGREES
T OFFSET: 416 MS
VENTRICULAR RATE: 59 BPM

## 2024-04-03 ENCOUNTER — PATIENT OUTREACH (OUTPATIENT)
Dept: PRIMARY CARE | Facility: CLINIC | Age: 44
End: 2024-04-03
Payer: COMMERCIAL

## 2024-04-03 NOTE — PROGRESS NOTES
Unable to reach patient for call back after patient's follow up appointment with Cardio.  Appt is upcoming on 4/17/24- reminded pt on voicemail of upcoming appt.   LVM with call back number for patient to call if needed to assist with any questions or concerns patient may have.

## 2024-04-17 ENCOUNTER — OFFICE VISIT (OUTPATIENT)
Dept: CARDIOLOGY | Facility: CLINIC | Age: 44
End: 2024-04-17

## 2024-04-17 VITALS
SYSTOLIC BLOOD PRESSURE: 136 MMHG | HEART RATE: 80 BPM | HEIGHT: 69 IN | WEIGHT: 222.8 LBS | DIASTOLIC BLOOD PRESSURE: 82 MMHG | BODY MASS INDEX: 33 KG/M2

## 2024-04-17 DIAGNOSIS — R94.31 ABNORMAL EKG: ICD-10-CM

## 2024-04-17 DIAGNOSIS — R06.02 SHORTNESS OF BREATH: ICD-10-CM

## 2024-04-17 DIAGNOSIS — Q21.12 PFO (PATENT FORAMEN OVALE) (HHS-HCC): ICD-10-CM

## 2024-04-17 DIAGNOSIS — I10 ESSENTIAL HYPERTENSION: ICD-10-CM

## 2024-04-17 DIAGNOSIS — G45.9 TIA (TRANSIENT ISCHEMIC ATTACK): ICD-10-CM

## 2024-04-17 DIAGNOSIS — R29.90 STROKE-LIKE SYMPTOMS: ICD-10-CM

## 2024-04-17 DIAGNOSIS — T50.902S: ICD-10-CM

## 2024-04-17 DIAGNOSIS — E78.2 MIXED HYPERLIPIDEMIA: ICD-10-CM

## 2024-04-17 PROCEDURE — 3075F SYST BP GE 130 - 139MM HG: CPT | Performed by: INTERNAL MEDICINE

## 2024-04-17 PROCEDURE — 99214 OFFICE O/P EST MOD 30 MIN: CPT | Performed by: INTERNAL MEDICINE

## 2024-04-17 PROCEDURE — 3008F BODY MASS INDEX DOCD: CPT | Performed by: INTERNAL MEDICINE

## 2024-04-17 PROCEDURE — 3079F DIAST BP 80-89 MM HG: CPT | Performed by: INTERNAL MEDICINE

## 2024-04-17 RX ORDER — AMLODIPINE BESYLATE 5 MG/1
5 TABLET ORAL DAILY
Qty: 90 TABLET | Refills: 3 | Status: SHIPPED | OUTPATIENT
Start: 2024-04-17 | End: 2025-04-17

## 2024-04-17 NOTE — PROGRESS NOTES
CARDIOLOGY OFFICE VISIT      CHIEF COMPLAINT  Chief Complaint   Patient presents with    Hospital Follow-up     Due to stroke-like symptoms        HISTORY OF PRESENT ILLNESS    Patient is a 43-year-old  male with past medical history significant for TIA related to cocaine abuse, patent foramen ovale, hypertension, fatty liver, diverticulosis, irritable bowel syndrome, medical noncompliance who presents for follow-up cardiovascular care.    Patient was recently hospitalized for a TIA with symptoms of left-sided weakness, left facial droop, dysarthria and was diagnosed with a TIA by neurology in the setting of smoking crack cocaine.  He does continue to occasionally use cocaine.  His neurologic symptoms have resolved.  He denies chest pain.  He has dyspnea on exertion.  He has occasional palpitations when anxious or using cocaine.  He has had occasional nausea and vomiting.  Denies dizziness, near-syncope, umm syncope, edema.    Past surgical history:  Left elbow surgery    Social history:  Occasional tobacco use  Vaping  Positive cocaine use  Positive marijuana use  Occasional alcohol use    Family history:  Father  60 with esophageal cancer  Mother history of breast cancer, diabetes, heart disease    Review of systems have been reviewed and are negative, noncontributory, as previously mentioned x 12 systems.    I personally reviewed EKG 2024: Sinus bradycardia, ST elevation consistent with early repolarization    Assessment:  Dyspnea on exertion  Abnormal EKG  History of TIA in the setting of cocaine abuse  Patent foramen ovale  Hypertension  Fatty liver  Irritable bowel syndrome  Diverticulosis  Cocaine abuse  Marijuana use  Tobacco abuse/vaping  Medical noncompliance    Recommendations:  Recently patient has been compliant with dual antiplatelet therapy, statin and amlodipine.  Increase amlodipine to 5 mg daily.  To further evaluate from a cardiac standpoint we will obtain exercise  nuclear stress test, coronary artery calcium score.  I advised the patient have formal follow-up with neurology.  Follow-up with myself after testing.  I advise cessation of tobacco, vaping, cocaine, marijuana.  Further recommendations based on testing clinical course.    Please excuse any errors in grammar or translation related to this dictation.  Voice recognition software was utilized to prepare this document.  Recent Cardiovascular Testing:  The following results have been reviewed and updated.   Labs  3/18/24  1.TC 94, HDL 31, LDL 42, [id number]    DAYANARA 3/20/24  1.EF 60-65%  2.Mod. concentric LVH  3.Small PFO  4.Bubble study is [positive.    Echo 3/18/24  1.EF 60%  2.Positive Bubble Study  3.Small PFO    Carotid Duplex 12/6/23  1.Normal          VITALS  Vitals:    04/17/24 1235   BP: 136/82   Pulse: 80     Wt Readings from Last 4 Encounters:   04/17/24 101 kg (222 lb 12.8 oz)   03/18/24 99.8 kg (220 lb)   02/13/24 95.3 kg (210 lb)   12/06/23 95 kg (209 lb 7 oz)       PHYSICAL EXAM:  GENERAL:  Well developed, well nourished, in no acute distress.  CHEST:  Symmetric and nontender.  NEURO/PSYCH:  Alert and oriented times three with approppriate behavior and responses.  NECK:  Supple, no JVD, no bruit.  LUNGS:  Clear to auscultation bilaterally, normal respiratory effort.  HEART:  Rate and rhythm REGULAR with no evident murmur, no gallop appreciated.    There are no rubs, clicks or heaves.  EXTREMITIES:  Warm with good color, no clubbing or cyanosis.  There is no edema noted.  PERIPHERAL VASCULAR:  Pulses present and equally palpable; 2+ throughout.    ASSESSMENT AND PLAN      Past Medical History  Past Medical History:   Diagnosis Date    Anxiety     Asthma (HHS-HCC)     Chronic back pain     Depression     Diverticulitis     Fatty liver     IBS (irritable bowel syndrome)     TIA (transient ischemic attack)        Social History  Social History     Tobacco Use    Smoking status: Every Day     Types: Cigars     "Smokeless tobacco: Current    Tobacco comments:     Pt vapes   Substance Use Topics    Alcohol use: Yes     Comment: 1x a week    Drug use: Yes     Types: Marijuana     Comment: daily       Family History     Family History   Problem Relation Name Age of Onset    Diabetes Mother      Esophageal cancer Father          Allergies:  Allergies   Allergen Reactions    Trazodone Other        Outpatient Medications:  Current Outpatient Medications   Medication Instructions    amLODIPine (NORVASC) 2.5 mg, oral, Daily    aspirin 81 mg, oral, Daily    atorvastatin (LIPITOR) 80 mg, oral, Nightly    clopidogrel (PLAVIX) 75 mg, oral, Daily    GINSENG ORAL 1 tablet, oral, Daily        Recent Lab Results:    CMP:    Lab Results   Component Value Date     03/21/2024    K 3.5 03/21/2024     (H) 03/21/2024    CO2 26 03/21/2024    BUN 10 03/21/2024    CREATININE 0.80 03/21/2024    GLUCOSE 133 (H) 03/21/2024    CALCIUM 9.1 03/21/2024       Magnesium:    Lab Results   Component Value Date    MG 1.99 03/21/2024       Lipid Profile:    Lab Results   Component Value Date    TRIG 105 03/18/2024    HDL 31.2 03/18/2024    LDLCALC 42 03/18/2024       TSH:    Lab Results   Component Value Date    TSH 1.82 03/18/2024       BNP:   No results found for: \"BNP\"     PT/INR:    Lab Results   Component Value Date    PROTIME 12.8 12/06/2023    INR 1.1 12/06/2023       HgBA1c:    Lab Results   Component Value Date    HGBA1C 5.1 03/18/2024       BMP:  Lab Results   Component Value Date     03/21/2024     03/18/2024     02/13/2024    K 3.5 03/21/2024    K 3.5 03/18/2024    K 3.1 (L) 02/13/2024     (H) 03/21/2024     03/18/2024     02/13/2024    CO2 26 03/21/2024    CO2 26 03/18/2024    CO2 28 02/13/2024    BUN 10 03/21/2024    BUN 11 03/18/2024    BUN 10 02/13/2024    CREATININE 0.80 03/21/2024    CREATININE 0.95 03/18/2024    CREATININE 0.77 02/13/2024       CBC:  Lab Results   Component Value Date    WBC 5.9 " 03/21/2024    WBC 7.8 03/18/2024    WBC 6.1 02/13/2024    RBC 5.32 03/21/2024    RBC 5.94 (H) 03/18/2024    RBC 4.80 02/13/2024    HGB 13.6 03/21/2024    HGB 15.5 03/18/2024    HGB 12.4 (L) 02/13/2024    HCT 43.6 03/21/2024    HCT 48.3 03/18/2024    HCT 38.5 (L) 02/13/2024    MCV 82 03/21/2024    MCV 81 03/18/2024    MCV 80 02/13/2024    MCH 25.6 (L) 03/21/2024    MCH 26.1 03/18/2024    MCH 25.8 (L) 02/13/2024    MCHC 31.2 (L) 03/21/2024    MCHC 32.1 03/18/2024    MCHC 32.2 02/13/2024    RDW 15.3 (H) 03/21/2024    RDW 15.5 (H) 03/18/2024    RDW 15.1 (H) 02/13/2024     03/21/2024     03/18/2024     02/13/2024       Cardiac Enzymes:    Lab Results   Component Value Date    TROPHS 3 03/18/2024    TROPHS <3 03/18/2024    TROPHS 5 12/06/2023       Hepatic Function Panel:    Lab Results   Component Value Date    ALKPHOS 60 03/18/2024    ALT 18 03/18/2024    AST 15 03/18/2024    PROT 7.6 03/18/2024    BILITOT 0.4 03/18/2024           Dustin Juarez DO

## 2024-05-09 ENCOUNTER — PATIENT OUTREACH (OUTPATIENT)
Dept: PRIMARY CARE | Facility: CLINIC | Age: 44
End: 2024-05-09
Payer: COMMERCIAL

## 2024-05-09 NOTE — PROGRESS NOTES
CM reached out for scheduled outreach follow up call. After introducing myself the phone was hung up. Pt has met his target of 30 day no readmission. CM will  close TCM outreach program at this time.

## 2024-06-22 ENCOUNTER — HOSPITAL ENCOUNTER (EMERGENCY)
Facility: HOSPITAL | Age: 44
Discharge: HOME | End: 2024-06-22
Payer: COMMERCIAL

## 2024-06-22 VITALS
RESPIRATION RATE: 18 BRPM | HEIGHT: 69 IN | OXYGEN SATURATION: 100 % | BODY MASS INDEX: 32.88 KG/M2 | WEIGHT: 222 LBS | SYSTOLIC BLOOD PRESSURE: 145 MMHG | HEART RATE: 79 BPM | TEMPERATURE: 97.3 F | DIASTOLIC BLOOD PRESSURE: 79 MMHG

## 2024-06-22 DIAGNOSIS — M43.6 TORTICOLLIS, ACUTE: Primary | ICD-10-CM

## 2024-06-22 PROCEDURE — 99283 EMERGENCY DEPT VISIT LOW MDM: CPT

## 2024-06-22 PROCEDURE — 2500000005 HC RX 250 GENERAL PHARMACY W/O HCPCS: Performed by: PHYSICIAN ASSISTANT

## 2024-06-22 PROCEDURE — 2500000002 HC RX 250 W HCPCS SELF ADMINISTERED DRUGS (ALT 637 FOR MEDICARE OP, ALT 636 FOR OP/ED): Performed by: PHYSICIAN ASSISTANT

## 2024-06-22 PROCEDURE — 2500000004 HC RX 250 GENERAL PHARMACY W/ HCPCS (ALT 636 FOR OP/ED): Performed by: PHYSICIAN ASSISTANT

## 2024-06-22 RX ORDER — DIAZEPAM 5 MG/1
5 TABLET ORAL ONCE
Status: COMPLETED | OUTPATIENT
Start: 2024-06-22 | End: 2024-06-22

## 2024-06-22 RX ORDER — NAPROXEN 500 MG/1
500 TABLET ORAL
Qty: 30 TABLET | Refills: 0 | Status: SHIPPED | OUTPATIENT
Start: 2024-06-22 | End: 2024-07-07

## 2024-06-22 RX ORDER — LIDOCAINE 50 MG/G
1 PATCH TOPICAL DAILY
Qty: 14 PATCH | Refills: 0 | Status: SHIPPED | OUTPATIENT
Start: 2024-06-22 | End: 2024-06-27

## 2024-06-22 RX ORDER — LIDOCAINE 560 MG/1
1 PATCH PERCUTANEOUS; TOPICAL; TRANSDERMAL ONCE
Status: DISCONTINUED | OUTPATIENT
Start: 2024-06-22 | End: 2024-06-22 | Stop reason: HOSPADM

## 2024-06-22 RX ORDER — METHOCARBAMOL 500 MG/1
500 TABLET, FILM COATED ORAL 3 TIMES DAILY
Qty: 21 TABLET | Refills: 0 | Status: SHIPPED | OUTPATIENT
Start: 2024-06-22 | End: 2024-06-29

## 2024-06-22 RX ADMIN — DIAZEPAM 5 MG: 5 TABLET ORAL at 04:11

## 2024-06-22 RX ADMIN — DEXAMETHASONE 10 MG: 6 TABLET ORAL at 04:11

## 2024-06-22 RX ADMIN — LIDOCAINE 1 PATCH: 4 PATCH TOPICAL at 04:11

## 2024-06-22 ASSESSMENT — COLUMBIA-SUICIDE SEVERITY RATING SCALE - C-SSRS
1. IN THE PAST MONTH, HAVE YOU WISHED YOU WERE DEAD OR WISHED YOU COULD GO TO SLEEP AND NOT WAKE UP?: NO
2. HAVE YOU ACTUALLY HAD ANY THOUGHTS OF KILLING YOURSELF?: NO
6. HAVE YOU EVER DONE ANYTHING, STARTED TO DO ANYTHING, OR PREPARED TO DO ANYTHING TO END YOUR LIFE?: NO

## 2024-06-22 ASSESSMENT — PAIN DESCRIPTION - LOCATION: LOCATION: NECK

## 2024-06-22 ASSESSMENT — PAIN - FUNCTIONAL ASSESSMENT: PAIN_FUNCTIONAL_ASSESSMENT: 0-10

## 2024-06-22 ASSESSMENT — PAIN SCALES - GENERAL: PAINLEVEL_OUTOF10: 7

## 2024-06-22 ASSESSMENT — PAIN DESCRIPTION - PROGRESSION: CLINICAL_PROGRESSION: NOT CHANGED

## 2024-06-22 NOTE — Clinical Note
Jean-Pierre Stubbs was seen and treated in our emergency department on 6/22/2024.  He may return to work on 06/24/2024.       If you have any questions or concerns, please don't hesitate to call.      Eagle Baptiste PA-C

## 2024-07-14 ENCOUNTER — HOSPITAL ENCOUNTER (EMERGENCY)
Age: 44
Discharge: HOME | End: 2024-07-15

## 2024-07-14 DIAGNOSIS — M54.41 ACUTE MIDLINE LOW BACK PAIN WITH BILATERAL SCIATICA: Primary | ICD-10-CM

## 2024-07-14 DIAGNOSIS — M54.42 ACUTE MIDLINE LOW BACK PAIN WITH BILATERAL SCIATICA: Primary | ICD-10-CM

## 2024-07-14 PROCEDURE — 96372 THER/PROPH/DIAG INJ SC/IM: CPT

## 2024-07-14 PROCEDURE — 99284 EMERGENCY DEPT VISIT MOD MDM: CPT

## 2024-07-14 ASSESSMENT — LIFESTYLE VARIABLES
HOW OFTEN DO YOU HAVE A DRINK CONTAINING ALCOHOL: NEVER
HOW MANY STANDARD DRINKS CONTAINING ALCOHOL DO YOU HAVE ON A TYPICAL DAY: PATIENT DOES NOT DRINK

## 2024-07-14 ASSESSMENT — PAIN - FUNCTIONAL ASSESSMENT: PAIN_FUNCTIONAL_ASSESSMENT: 0-10

## 2024-07-14 ASSESSMENT — PAIN SCALES - GENERAL: PAINLEVEL_OUTOF10: 8

## 2024-07-14 ASSESSMENT — PAIN DESCRIPTION - LOCATION: LOCATION: BACK

## 2024-07-15 ENCOUNTER — APPOINTMENT (OUTPATIENT)
Dept: GENERAL RADIOLOGY | Age: 44
End: 2024-07-15

## 2024-07-15 VITALS
OXYGEN SATURATION: 100 % | SYSTOLIC BLOOD PRESSURE: 113 MMHG | WEIGHT: 222 LBS | RESPIRATION RATE: 18 BRPM | TEMPERATURE: 98.1 F | HEIGHT: 70 IN | BODY MASS INDEX: 31.78 KG/M2 | DIASTOLIC BLOOD PRESSURE: 68 MMHG | HEART RATE: 56 BPM

## 2024-07-15 LAB
BACTERIA URNS QL MICRO: NEGATIVE /HPF
BILIRUB UR QL STRIP: NEGATIVE
CLARITY UR: CLEAR
COLOR UR: YELLOW
EPI CELLS #/AREA URNS AUTO: ABNORMAL /HPF (ref 0–5)
GLUCOSE UR STRIP-MCNC: NEGATIVE MG/DL
HGB UR QL STRIP: NEGATIVE
HYALINE CASTS #/AREA URNS AUTO: ABNORMAL /HPF (ref 0–5)
KETONES UR STRIP-MCNC: ABNORMAL MG/DL
LEUKOCYTE ESTERASE UR QL STRIP: ABNORMAL
NITRITE UR QL STRIP: NEGATIVE
PH UR STRIP: 5.5 [PH] (ref 5–9)
PROT UR STRIP-MCNC: NEGATIVE MG/DL
RBC #/AREA URNS AUTO: ABNORMAL /HPF (ref 0–5)
SP GR UR STRIP: 1.02 (ref 1–1.03)
URINE REFLEX TO CULTURE: YES
UROBILINOGEN UR STRIP-ACNC: 0.2 E.U./DL
WBC #/AREA URNS AUTO: ABNORMAL /HPF (ref 0–5)

## 2024-07-15 PROCEDURE — 6360000002 HC RX W HCPCS

## 2024-07-15 PROCEDURE — 81001 URINALYSIS AUTO W/SCOPE: CPT

## 2024-07-15 PROCEDURE — 87086 URINE CULTURE/COLONY COUNT: CPT

## 2024-07-15 PROCEDURE — 6370000000 HC RX 637 (ALT 250 FOR IP): Performed by: STUDENT IN AN ORGANIZED HEALTH CARE EDUCATION/TRAINING PROGRAM

## 2024-07-15 PROCEDURE — 72110 X-RAY EXAM L-2 SPINE 4/>VWS: CPT

## 2024-07-15 RX ORDER — CYCLOBENZAPRINE HCL 10 MG
10 TABLET ORAL 3 TIMES DAILY PRN
Qty: 9 TABLET | Refills: 0 | Status: SHIPPED | OUTPATIENT
Start: 2024-07-15 | End: 2024-07-18

## 2024-07-15 RX ORDER — ACETAMINOPHEN 500 MG
1000 TABLET ORAL ONCE
Status: COMPLETED | OUTPATIENT
Start: 2024-07-15 | End: 2024-07-15

## 2024-07-15 RX ORDER — DIAZEPAM 5 MG/1
5 TABLET ORAL ONCE
Status: COMPLETED | OUTPATIENT
Start: 2024-07-15 | End: 2024-07-15

## 2024-07-15 RX ORDER — IBUPROFEN 800 MG/1
800 TABLET ORAL 2 TIMES DAILY PRN
Qty: 40 TABLET | Refills: 0 | Status: SHIPPED | OUTPATIENT
Start: 2024-07-15

## 2024-07-15 RX ORDER — ORPHENADRINE CITRATE 30 MG/ML
60 INJECTION INTRAMUSCULAR; INTRAVENOUS ONCE
Status: DISCONTINUED | OUTPATIENT
Start: 2024-07-15 | End: 2024-07-15 | Stop reason: HOSPADM

## 2024-07-15 RX ORDER — DEXAMETHASONE 6 MG/1
12 TABLET ORAL ONCE
Status: COMPLETED | OUTPATIENT
Start: 2024-07-15 | End: 2024-07-15

## 2024-07-15 RX ORDER — KETOROLAC TROMETHAMINE 30 MG/ML
30 INJECTION, SOLUTION INTRAMUSCULAR; INTRAVENOUS ONCE
Status: COMPLETED | OUTPATIENT
Start: 2024-07-15 | End: 2024-07-15

## 2024-07-15 RX ADMIN — DIAZEPAM 5 MG: 5 TABLET ORAL at 00:41

## 2024-07-15 RX ADMIN — DEXAMETHASONE 12 MG: 6 TABLET ORAL at 00:41

## 2024-07-15 RX ADMIN — ACETAMINOPHEN 1000 MG: 500 TABLET ORAL at 00:41

## 2024-07-15 RX ADMIN — KETOROLAC TROMETHAMINE 30 MG: 30 INJECTION, SOLUTION INTRAMUSCULAR; INTRAVENOUS at 00:42

## 2024-07-15 ASSESSMENT — PAIN SCALES - GENERAL: PAINLEVEL_OUTOF10: 9

## 2024-07-15 ASSESSMENT — PAIN DESCRIPTION - LOCATION: LOCATION: BACK

## 2024-07-15 ASSESSMENT — PAIN DESCRIPTION - ORIENTATION: ORIENTATION: LEFT;LOWER

## 2024-07-15 ASSESSMENT — ENCOUNTER SYMPTOMS: BACK PAIN: 1

## 2024-07-15 NOTE — ED PROVIDER NOTES
[facility] ED  EMERGENCY DEPARTMENT ENCOUNTER      Pt Name: Thomas Loomis  MRN: [id number]  Birthdate 1980  Date of evaluation: 7/14/2024  Provider: NINFA Darnell  2:23 AM EDT    CHIEF COMPLAINT       Chief Complaint   Patient presents with    Back Pain     Pt to ER with c/o lower back pain that radiates down both legs         HISTORY OF PRESENT ILLNESS   (Location/Symptom, Timing/Onset, Context/Setting, Quality, Duration, Modifying Factors, Severity)  Note limiting factors.   Thomas Loomis is a 43 y.o. male whom per chart review has a PMHx of LEANN, diverticulitis, anxiety, depression, chronic back pain presents to ED for evaluation of back pain.  Patient reports that he slipped and had a fall and states that he has had pain since.  Patient states that pain extends down bilateral lower extremities.  States that fall occurred approximately 2 to 3 days ago.  Patient denies head injury, LOC, anticoagulation.  Patient denies take any OTC medications for relief of symptoms.  Patient verbalizes no additional complaints.  Patient denies chest pain, shortness of breath, N/V/D, fever, chills, hematuria, dysuria, urinary frequency/urgency, saddle anesthesias, incontinence of bowel and bladder, numbness/tingling/weakness of BUE and BLE.    HPI    Nursing Notes were reviewed.    REVIEW OF SYSTEMS    (2-9 systems for level 4, 10 or more for level 5)     Review of Systems   Musculoskeletal:  Positive for back pain.   All other systems reviewed and are negative.      Except as noted above the remainder of the review of systems was reviewed and negative.       PAST MEDICAL HISTORY     Past Medical History:   Diagnosis Date    Anxiety     Chronic back pain     Depression     Diverticulitis     Fatty liver          SURGICAL HISTORY       Past Surgical History:   Procedure Laterality Date    COLONOSCOPY  1/20/15    UPPER GASTROINTESTINAL ENDOSCOPY  1/20/15         CURRENT MEDICATIONS       Discharge

## 2024-07-16 LAB — BACTERIA UR CULT: NORMAL

## 2024-08-15 ENCOUNTER — HOSPITAL ENCOUNTER (EMERGENCY)
Age: 44
Discharge: HOME | End: 2024-08-15
Attending: STUDENT IN AN ORGANIZED HEALTH CARE EDUCATION/TRAINING PROGRAM
Payer: MEDICAID

## 2024-08-15 VITALS
RESPIRATION RATE: 19 BRPM | SYSTOLIC BLOOD PRESSURE: 126 MMHG | DIASTOLIC BLOOD PRESSURE: 108 MMHG | TEMPERATURE: 97.9 F | HEART RATE: 72 BPM | BODY MASS INDEX: 32.58 KG/M2 | WEIGHT: 220 LBS | OXYGEN SATURATION: 100 % | HEIGHT: 69 IN

## 2024-08-15 DIAGNOSIS — M54.2 NECK PAIN: Primary | ICD-10-CM

## 2024-08-15 DIAGNOSIS — S16.1XXA STRAIN OF NECK MUSCLE, INITIAL ENCOUNTER: ICD-10-CM

## 2024-08-15 PROCEDURE — 96374 THER/PROPH/DIAG INJ IV PUSH: CPT

## 2024-08-15 PROCEDURE — 6360000002 HC RX W HCPCS: Performed by: STUDENT IN AN ORGANIZED HEALTH CARE EDUCATION/TRAINING PROGRAM

## 2024-08-15 PROCEDURE — 6370000000 HC RX 637 (ALT 250 FOR IP): Performed by: STUDENT IN AN ORGANIZED HEALTH CARE EDUCATION/TRAINING PROGRAM

## 2024-08-15 PROCEDURE — 99284 EMERGENCY DEPT VISIT MOD MDM: CPT

## 2024-08-15 PROCEDURE — 96375 TX/PRO/DX INJ NEW DRUG ADDON: CPT

## 2024-08-15 RX ORDER — DEXAMETHASONE SODIUM PHOSPHATE 10 MG/ML
10 INJECTION, SOLUTION INTRAMUSCULAR; INTRAVENOUS ONCE
Status: COMPLETED | OUTPATIENT
Start: 2024-08-15 | End: 2024-08-15

## 2024-08-15 RX ORDER — ACETAMINOPHEN 500 MG
1000 TABLET ORAL ONCE
Status: COMPLETED | OUTPATIENT
Start: 2024-08-15 | End: 2024-08-15

## 2024-08-15 RX ORDER — ACETAMINOPHEN 500 MG
1000 TABLET ORAL EVERY 6 HOURS PRN
Qty: 60 TABLET | Refills: 0 | Status: SHIPPED | OUTPATIENT
Start: 2024-08-15

## 2024-08-15 RX ORDER — KETOROLAC TROMETHAMINE 30 MG/ML
30 INJECTION, SOLUTION INTRAMUSCULAR; INTRAVENOUS ONCE
Status: DISCONTINUED | OUTPATIENT
Start: 2024-08-15 | End: 2024-08-15

## 2024-08-15 RX ORDER — KETOROLAC TROMETHAMINE 15 MG/ML
15 INJECTION, SOLUTION INTRAMUSCULAR; INTRAVENOUS ONCE
Status: COMPLETED | OUTPATIENT
Start: 2024-08-15 | End: 2024-08-15

## 2024-08-15 RX ORDER — METHOCARBAMOL 500 MG/1
1500 TABLET, FILM COATED ORAL ONCE
Status: COMPLETED | OUTPATIENT
Start: 2024-08-15 | End: 2024-08-15

## 2024-08-15 RX ADMIN — ACETAMINOPHEN 1000 MG: 500 TABLET ORAL at 09:08

## 2024-08-15 RX ADMIN — DEXAMETHASONE SODIUM PHOSPHATE 10 MG: 10 INJECTION, SOLUTION INTRAMUSCULAR; INTRAVENOUS at 09:08

## 2024-08-15 RX ADMIN — METHOCARBAMOL TABLETS 1500 MG: 500 TABLET, COATED ORAL at 09:08

## 2024-08-15 RX ADMIN — KETOROLAC TROMETHAMINE 15 MG: 15 INJECTION, SOLUTION INTRAMUSCULAR; INTRAVENOUS at 09:10

## 2024-08-15 ASSESSMENT — PAIN DESCRIPTION - LOCATION
LOCATION: NECK
LOCATION: NECK

## 2024-08-15 ASSESSMENT — PAIN SCALES - GENERAL
PAINLEVEL_OUTOF10: 7
PAINLEVEL_OUTOF10: 8

## 2024-08-15 ASSESSMENT — PAIN - FUNCTIONAL ASSESSMENT: PAIN_FUNCTIONAL_ASSESSMENT: 0-10

## 2024-08-15 ASSESSMENT — LIFESTYLE VARIABLES
HOW MANY STANDARD DRINKS CONTAINING ALCOHOL DO YOU HAVE ON A TYPICAL DAY: 1 OR 2
HOW OFTEN DO YOU HAVE A DRINK CONTAINING ALCOHOL: MONTHLY OR LESS

## 2024-08-15 ASSESSMENT — PAIN DESCRIPTION - ORIENTATION: ORIENTATION: LEFT

## 2024-08-15 ASSESSMENT — PAIN DESCRIPTION - PAIN TYPE: TYPE: ACUTE PAIN

## 2024-08-15 NOTE — ED PROVIDER NOTES
[facility] ED  eMERGENCY dEPARTMENT eNCOUnter      Pt Name: Thomas Loomis  MRN: [id number]  Birthdate 1980  Date of evaluation: 8/15/2024  Provider: Joce Reed MD  Note Started: 8/15/24 8:26 AM EDT    HISTORY OF PRESENT ILLNESS      Chief Complaint   Patient presents with    Neck Pain     With swelling       The history is provided by the Patient.  Thomas Loomis is a 43 y.o. male with a PMH clinically significant for HTN, HLD, Obesity, Diverticulosis, Chronic back pain, Anxiety, MDD, and Tobacco Smoking presenting to the ED via PV c/o ***.    Denies any associated: {.NAASSOC:67031}.  Precipitating Factors: {.NAPRECIP:70560:a}. Denies preceding {.NAPRECIP:43228}.  Worsening Factors: {.EXACERBATINGFACTORS:53455}. Denies worsening with {.EXACERBATINGFACTORS:11171:o}.  Alleviating Factors: {.ALLEVIATINGFACTORS:00053}. Denies any alleviation with {.ALLEVIATINGFACTORS:93761:o}.  States ***history of similar previous episodes.  States they have otherwise been feeling well***.  Taking all medications as indicated: {Response; yes/no:64}.    Per Chart Review: PMH as noted above obtained via outpatient chart review.   ***      REVIEW OF SYSTEMS       Review of Systems  Otherwise as noted in HPI    PAST MEDICAL HISTORY     Past Medical History:   Diagnosis Date    Anxiety     Chronic back pain     Depression     Diverticulitis     Fatty liver        SURGICAL HISTORY       Past Surgical History:   Procedure Laterality Date    COLONOSCOPY  1/20/15    UPPER GASTROINTESTINAL ENDOSCOPY  1/20/15       FAMILY HISTORY       Family History   Problem Relation Age of Onset    Diabetes Mother     Cancer Mother     Arthritis Father     Other Father         anxiety    Asthma Sister     Asthma Brother        SOCIAL HISTORY       Social History     Socioeconomic History    Marital status:      Spouse name: None    Number of children: None    Years of education: None    Highest education level: None

## 2024-08-15 NOTE — ED TRIAGE NOTES
Reports left sided neck swelling and pain  Pt states pain radiates upward   Pt has full ROM of neck but the left side is swollen and painful  Pt denies injury and trauma   Pt states this has been ongoing for 2 weeks

## 2024-08-15 NOTE — ED NOTES
AVS reviewed with and given to pt  Pt aware to follow up with pcp  Verification of presence form filled/given to pt per pt request  Denies any needs at this time  Iv removed

## 2024-10-10 ENCOUNTER — APPOINTMENT (OUTPATIENT)
Dept: GENERAL RADIOLOGY | Age: 44
End: 2024-10-10
Payer: MEDICAID

## 2024-10-10 ENCOUNTER — HOSPITAL ENCOUNTER (EMERGENCY)
Age: 44
Discharge: HOME | End: 2024-10-10
Payer: MEDICAID

## 2024-10-10 VITALS
SYSTOLIC BLOOD PRESSURE: 132 MMHG | DIASTOLIC BLOOD PRESSURE: 84 MMHG | RESPIRATION RATE: 18 BRPM | BODY MASS INDEX: 33.62 KG/M2 | TEMPERATURE: 98 F | OXYGEN SATURATION: 99 % | HEIGHT: 69 IN | WEIGHT: 227 LBS | HEART RATE: 70 BPM

## 2024-10-10 DIAGNOSIS — M54.2 NECK PAIN: Primary | ICD-10-CM

## 2024-10-10 PROCEDURE — 72040 X-RAY EXAM NECK SPINE 2-3 VW: CPT

## 2024-10-10 PROCEDURE — 96372 THER/PROPH/DIAG INJ SC/IM: CPT

## 2024-10-10 PROCEDURE — 99284 EMERGENCY DEPT VISIT MOD MDM: CPT

## 2024-10-10 PROCEDURE — 6360000002 HC RX W HCPCS

## 2024-10-10 RX ORDER — METHOCARBAMOL 750 MG/1
750 TABLET, FILM COATED ORAL 4 TIMES DAILY
Qty: 40 TABLET | Refills: 0 | Status: SHIPPED | OUTPATIENT
Start: 2024-10-10 | End: 2024-10-20

## 2024-10-10 RX ORDER — IBUPROFEN 600 MG/1
600 TABLET, FILM COATED ORAL 4 TIMES DAILY PRN
Qty: 40 TABLET | Refills: 0 | Status: SHIPPED | OUTPATIENT
Start: 2024-10-10

## 2024-10-10 RX ORDER — ORPHENADRINE CITRATE 30 MG/ML
60 INJECTION INTRAMUSCULAR; INTRAVENOUS ONCE
Status: COMPLETED | OUTPATIENT
Start: 2024-10-10 | End: 2024-10-10

## 2024-10-10 RX ORDER — KETOROLAC TROMETHAMINE 30 MG/ML
30 INJECTION, SOLUTION INTRAMUSCULAR; INTRAVENOUS ONCE
Status: COMPLETED | OUTPATIENT
Start: 2024-10-10 | End: 2024-10-10

## 2024-10-10 RX ADMIN — KETOROLAC TROMETHAMINE 30 MG: 30 INJECTION, SOLUTION INTRAMUSCULAR at 09:38

## 2024-10-10 RX ADMIN — ORPHENADRINE CITRATE 60 MG: 30 INJECTION, SOLUTION INTRAMUSCULAR; INTRAVENOUS at 09:38

## 2024-10-10 ASSESSMENT — PAIN SCALES - GENERAL
PAINLEVEL_OUTOF10: 9
PAINLEVEL_OUTOF10: 10

## 2024-10-10 ASSESSMENT — PAIN DESCRIPTION - DESCRIPTORS: DESCRIPTORS: ACHING;SHARP

## 2024-10-10 ASSESSMENT — PAIN DESCRIPTION - LOCATION: LOCATION: NECK;BACK

## 2024-10-10 ASSESSMENT — ENCOUNTER SYMPTOMS
TROUBLE SWALLOWING: 0
FACIAL SWELLING: 0
SHORTNESS OF BREATH: 0
ALLERGIC/IMMUNOLOGIC NEGATIVE: 1
EYES NEGATIVE: 1
GASTROINTESTINAL NEGATIVE: 1
RESPIRATORY NEGATIVE: 1

## 2024-10-10 ASSESSMENT — PAIN - FUNCTIONAL ASSESSMENT
PAIN_FUNCTIONAL_ASSESSMENT: ACTIVITIES ARE NOT PREVENTED
PAIN_FUNCTIONAL_ASSESSMENT: 0-10
PAIN_FUNCTIONAL_ASSESSMENT: NONE - DENIES PAIN

## 2024-10-10 ASSESSMENT — PAIN DESCRIPTION - ORIENTATION: ORIENTATION: LEFT

## 2024-10-10 NOTE — ED PROVIDER NOTES
[facility] ED  eMERGENCY dEPARTMENT eNCOUnter      Pt Name: Thomas Loomis  MRN: [id number]  Birthdate 1980  Date of evaluation: 10/10/2024  Provider: NENA Sorenson CNP      HISTORY OF PRESENT ILLNESS    Thomas Loomis is a 44 y.o. male who presents to the Emergency Department with past medical history of anxiety depression diverticulitis.  Patient presents today with left-sided neck pain x 1 month.  Patient has been seen in ER before for same complaint.  Patient reports no relief from muscle relaxer prescribed.  Patient reports tenderness and swelling to left neck.  Full range of motion is noted.  Patient denies numbness and tingling to bilateral upper extremities.  Patient denies blurred vision denies headaches denies dizziness denies lightheadedness denies weakness denies decreased hearing denies dental pain.  Patient denies new injury or trauma.  Patient also denies chest pain abdominal pain shortness of breath.  Patient is afebrile and denies fevers at home.  Patient is hemodynamically stable nontoxic-appearing.        REVIEW OF SYSTEMS       Review of Systems   Constitutional: Negative.  Negative for chills and fever.   HENT:  Negative for dental problem, ear pain, facial swelling, hearing loss, tinnitus and trouble swallowing.    Eyes: Negative.    Respiratory: Negative.  Negative for shortness of breath.    Cardiovascular: Negative.  Negative for chest pain.   Gastrointestinal: Negative.    Endocrine: Negative.    Genitourinary: Negative.    Musculoskeletal:  Positive for myalgias and neck pain. Negative for joint swelling and neck stiffness.   Skin: Negative.    Allergic/Immunologic: Negative.    Neurological:  Negative for dizziness, speech difficulty, weakness, light-headedness, numbness and headaches.   Psychiatric/Behavioral: Negative.           PAST MEDICAL HISTORY     Past Medical History:   Diagnosis Date    Anxiety     Chronic back pain     Depression     Diverticulitis

## 2024-11-19 ENCOUNTER — HOSPITAL ENCOUNTER (EMERGENCY)
Age: 44
Discharge: HOME | End: 2024-11-19
Payer: COMMERCIAL

## 2024-11-19 ENCOUNTER — APPOINTMENT (OUTPATIENT)
Dept: GENERAL RADIOLOGY | Age: 44
End: 2024-11-19
Payer: COMMERCIAL

## 2024-11-19 VITALS
WEIGHT: 230 LBS | OXYGEN SATURATION: 98 % | BODY MASS INDEX: 34.07 KG/M2 | HEIGHT: 69 IN | RESPIRATION RATE: 15 BRPM | TEMPERATURE: 98.4 F | SYSTOLIC BLOOD PRESSURE: 147 MMHG | HEART RATE: 69 BPM | DIASTOLIC BLOOD PRESSURE: 88 MMHG

## 2024-11-19 DIAGNOSIS — B34.8 RHINOVIRUS: Primary | ICD-10-CM

## 2024-11-19 LAB
B PARAP IS1001 DNA NPH QL NAA+NON-PROBE: NOT DETECTED
B PERT.PT PRMT NPH QL NAA+NON-PROBE: NOT DETECTED
C PNEUM DNA NPH QL NAA+NON-PROBE: NOT DETECTED
FLUAV RNA NPH QL NAA+NON-PROBE: NOT DETECTED
FLUBV RNA NPH QL NAA+NON-PROBE: NOT DETECTED
HADV DNA NPH QL NAA+NON-PROBE: NOT DETECTED
HCOV 229E RNA NPH QL NAA+NON-PROBE: NOT DETECTED
HCOV HKU1 RNA NPH QL NAA+NON-PROBE: NOT DETECTED
HCOV NL63 RNA NPH QL NAA+NON-PROBE: NOT DETECTED
HCOV OC43 RNA NPH QL NAA+NON-PROBE: NOT DETECTED
HMPV RNA NPH QL NAA+NON-PROBE: NOT DETECTED
HPIV1 RNA NPH QL NAA+NON-PROBE: NOT DETECTED
HPIV2 RNA NPH QL NAA+NON-PROBE: NOT DETECTED
HPIV3 RNA NPH QL NAA+NON-PROBE: NOT DETECTED
HPIV4 RNA NPH QL NAA+NON-PROBE: NOT DETECTED
M PNEUMO DNA NPH QL NAA+NON-PROBE: NOT DETECTED
RSV RNA NPH QL NAA+NON-PROBE: NOT DETECTED
RV+EV RNA NPH QL NAA+NON-PROBE: DETECTED
SARS-COV-2 RNA NPH QL NAA+NON-PROBE: NOT DETECTED

## 2024-11-19 PROCEDURE — 6370000000 HC RX 637 (ALT 250 FOR IP): Performed by: PERSONAL EMERGENCY RESPONSE ATTENDANT

## 2024-11-19 PROCEDURE — 94640 AIRWAY INHALATION TREATMENT: CPT

## 2024-11-19 PROCEDURE — 99284 EMERGENCY DEPT VISIT MOD MDM: CPT

## 2024-11-19 PROCEDURE — 0202U NFCT DS 22 TRGT SARS-COV-2: CPT

## 2024-11-19 PROCEDURE — 71045 X-RAY EXAM CHEST 1 VIEW: CPT

## 2024-11-19 RX ORDER — PREDNISONE 20 MG/1
20 TABLET ORAL ONCE
Status: COMPLETED | OUTPATIENT
Start: 2024-11-19 | End: 2024-11-19

## 2024-11-19 RX ORDER — PREDNISONE 10 MG/1
10 TABLET ORAL DAILY
Qty: 6 TABLET | Refills: 0 | Status: SHIPPED | OUTPATIENT
Start: 2024-11-19 | End: 2024-11-25

## 2024-11-19 RX ORDER — ALBUTEROL SULFATE 0.83 MG/ML
2.5 SOLUTION RESPIRATORY (INHALATION) EVERY 6 HOURS PRN
Qty: 120 EACH | Refills: 0 | Status: SHIPPED | OUTPATIENT
Start: 2024-11-19

## 2024-11-19 RX ORDER — CODEINE PHOSPHATE AND GUAIFENESIN 10; 100 MG/5ML; MG/5ML
10 SOLUTION ORAL ONCE
Status: COMPLETED | OUTPATIENT
Start: 2024-11-19 | End: 2024-11-19

## 2024-11-19 RX ORDER — IPRATROPIUM BROMIDE AND ALBUTEROL SULFATE 2.5; .5 MG/3ML; MG/3ML
2 SOLUTION RESPIRATORY (INHALATION) CONTINUOUS PRN
Status: DISCONTINUED | OUTPATIENT
Start: 2024-11-19 | End: 2024-11-19 | Stop reason: HOSPADM

## 2024-11-19 RX ADMIN — GUAIFENESIN AND CODEINE PHOSPHATE 10 ML: 100; 10 SOLUTION ORAL at 19:30

## 2024-11-19 RX ADMIN — PREDNISONE 20 MG: 20 TABLET ORAL at 19:30

## 2024-11-19 RX ADMIN — IPRATROPIUM BROMIDE AND ALBUTEROL SULFATE 2 DOSE: 2.5; .5 SOLUTION RESPIRATORY (INHALATION) at 18:29

## 2024-11-19 ASSESSMENT — ENCOUNTER SYMPTOMS
COLOR CHANGE: 0
VOMITING: 1
SORE THROAT: 0
NAUSEA: 1
ABDOMINAL PAIN: 0
SHORTNESS OF BREATH: 1
DIARRHEA: 1
COUGH: 1
BLOOD IN STOOL: 0
RHINORRHEA: 1

## 2024-11-19 ASSESSMENT — PAIN - FUNCTIONAL ASSESSMENT: PAIN_FUNCTIONAL_ASSESSMENT: NONE - DENIES PAIN

## 2024-11-19 NOTE — ED PROVIDER NOTES
[facility] ED  eMERGENCY dEPARTMENT eNCOUnter      Pt Name: Thomas Loomis  MRN: [id number]  Birthdate 1980  Date of evaluation: 11/19/2024  Provider: RASHAD ARCOS  6:09 PM EST     My attending is Dr. Ashley.    HISTORY OF PRESENT ILLNESS    Thomas Loomis is a 44 y.o. male with PMHx of anxiety, depression, fatty liver, tobacco use, asthma, chronic back pain, GERD, hypertension, hyperlipidemia, PTSD presents to the emergency department with URI.  3 to 4 days ago patient with chills, headaches with coughing, nasal congestion, yellow productive cough, shortness of breath without wheezing, nausea, vomiting (last emesis 2 days ago), diarrhea (3 episodes today).  Not using his inhaler at home and does not have a nebulizer machine.  He denies fevers, sore throat, known ill contacts, chest pain, abdominal pain, urinary symptoms. Normal appetite. Taking Excedrin at home for headaches.       HPI    Nursing Notes were reviewed.    REVIEW OF SYSTEMS       Review of Systems   Constitutional:  Positive for chills. Negative for appetite change, diaphoresis and fever.   HENT:  Positive for congestion and rhinorrhea. Negative for sore throat.    Respiratory:  Positive for cough and shortness of breath.    Cardiovascular:  Negative for chest pain.   Gastrointestinal:  Positive for diarrhea, nausea and vomiting. Negative for abdominal pain and blood in stool.   Genitourinary:  Negative for difficulty urinating.   Musculoskeletal:  Negative for neck stiffness.   Skin:  Negative for color change and rash.   Neurological:  Negative for dizziness, syncope, weakness, light-headedness, numbness and headaches.   All other systems reviewed and are negative.            PAST MEDICAL HISTORY     Past Medical History:   Diagnosis Date    Anxiety     Asthma     Chronic back pain     Depression     Diverticulitis     Fatty liver     GERD (gastroesophageal reflux disease)     HTN (hypertension)     Hyperlipidemia     PTSD

## 2024-11-20 NOTE — ED NOTES
Patient given discharge instructions and prescriptions and verbalized understanding. Vital signs stable. Resp even and unlabored. Skin warm, dry and intact. Patient is alert and oriented. Patient doesn't have any questions at this time.

## 2025-02-10 ENCOUNTER — HOSPITAL ENCOUNTER (EMERGENCY)
Age: 45
Discharge: HOME | End: 2025-02-10
Attending: EMERGENCY MEDICINE
Payer: COMMERCIAL

## 2025-02-10 ENCOUNTER — APPOINTMENT (OUTPATIENT)
Dept: GENERAL RADIOLOGY | Age: 45
End: 2025-02-10
Payer: COMMERCIAL

## 2025-02-10 VITALS
RESPIRATION RATE: 18 BRPM | HEART RATE: 75 BPM | DIASTOLIC BLOOD PRESSURE: 85 MMHG | WEIGHT: 225 LBS | OXYGEN SATURATION: 100 % | SYSTOLIC BLOOD PRESSURE: 118 MMHG | BODY MASS INDEX: 33.33 KG/M2 | HEIGHT: 69 IN | TEMPERATURE: 99.1 F

## 2025-02-10 DIAGNOSIS — J45.21 EXACERBATION OF INTERMITTENT ASTHMA, UNSPECIFIED ASTHMA SEVERITY: ICD-10-CM

## 2025-02-10 DIAGNOSIS — J10.1 INFLUENZA A: Primary | ICD-10-CM

## 2025-02-10 LAB
ALBUMIN SERPL-MCNC: 4.2 G/DL (ref 3.5–4.6)
ALP SERPL-CCNC: 88 U/L (ref 35–104)
ALT SERPL-CCNC: 31 U/L (ref 0–41)
ANION GAP SERPL CALCULATED.3IONS-SCNC: 14 MEQ/L (ref 9–15)
AST SERPL-CCNC: 23 U/L (ref 0–40)
BASOPHILS # BLD: 0 K/UL (ref 0–0.2)
BASOPHILS NFR BLD: 0.6 %
BILIRUB SERPL-MCNC: <0.2 MG/DL (ref 0.2–0.7)
BUN SERPL-MCNC: 6 MG/DL (ref 6–20)
CALCIUM SERPL-MCNC: 8.8 MG/DL (ref 8.5–9.9)
CHLORIDE SERPL-SCNC: 104 MEQ/L (ref 95–107)
CO2 SERPL-SCNC: 25 MEQ/L (ref 20–31)
CREAT SERPL-MCNC: 0.66 MG/DL (ref 0.7–1.2)
EOSINOPHIL # BLD: 0.1 K/UL (ref 0–0.7)
EOSINOPHIL NFR BLD: 1.8 %
ERYTHROCYTE [DISTWIDTH] IN BLOOD BY AUTOMATED COUNT: 15.2 % (ref 11.5–14.5)
GLOBULIN SER CALC-MCNC: 2.8 G/DL (ref 2.3–3.5)
GLUCOSE SERPL-MCNC: 156 MG/DL (ref 70–99)
HCT VFR BLD AUTO: 44.5 % (ref 42–52)
HGB BLD-MCNC: 14.1 G/DL (ref 14–18)
INFLUENZA A BY PCR: POSITIVE
INFLUENZA B BY PCR: NEGATIVE
LYMPHOCYTES # BLD: 2.6 K/UL (ref 1–4.8)
LYMPHOCYTES NFR BLD: 41.8 %
MCH RBC QN AUTO: 25.1 PG (ref 27–31.3)
MCHC RBC AUTO-ENTMCNC: 31.7 % (ref 33–37)
MCV RBC AUTO: 79.2 FL (ref 79–92.2)
MONOCYTES # BLD: 0.7 K/UL (ref 0.2–0.8)
MONOCYTES NFR BLD: 12 %
NEUTROPHILS # BLD: 2.7 K/UL (ref 1.4–6.5)
NEUTS SEG NFR BLD: 43.3 %
PLATELET # BLD AUTO: 218 K/UL (ref 130–400)
POTASSIUM SERPL-SCNC: 3.4 MEQ/L (ref 3.4–4.9)
PROT SERPL-MCNC: 7 G/DL (ref 6.3–8)
RBC # BLD AUTO: 5.62 M/UL (ref 4.7–6.1)
SARS-COV-2 RDRP RESP QL NAA+PROBE: NOT DETECTED
SODIUM SERPL-SCNC: 143 MEQ/L (ref 135–144)
WBC # BLD AUTO: 6.2 K/UL (ref 4.8–10.8)

## 2025-02-10 PROCEDURE — 99284 EMERGENCY DEPT VISIT MOD MDM: CPT

## 2025-02-10 PROCEDURE — 6360000002 HC RX W HCPCS: Performed by: EMERGENCY MEDICINE

## 2025-02-10 PROCEDURE — 6370000000 HC RX 637 (ALT 250 FOR IP): Performed by: EMERGENCY MEDICINE

## 2025-02-10 PROCEDURE — 87635 SARS-COV-2 COVID-19 AMP PRB: CPT

## 2025-02-10 PROCEDURE — 85025 COMPLETE CBC W/AUTO DIFF WBC: CPT

## 2025-02-10 PROCEDURE — 94761 N-INVAS EAR/PLS OXIMETRY MLT: CPT

## 2025-02-10 PROCEDURE — 71045 X-RAY EXAM CHEST 1 VIEW: CPT

## 2025-02-10 PROCEDURE — 80053 COMPREHEN METABOLIC PANEL: CPT

## 2025-02-10 PROCEDURE — 94640 AIRWAY INHALATION TREATMENT: CPT

## 2025-02-10 PROCEDURE — 87502 INFLUENZA DNA AMP PROBE: CPT

## 2025-02-10 RX ORDER — PREDNISONE 20 MG/1
40 TABLET ORAL DAILY
Qty: 10 TABLET | Refills: 0 | Status: SHIPPED | OUTPATIENT
Start: 2025-02-10 | End: 2025-02-15

## 2025-02-10 RX ORDER — ALBUTEROL SULFATE 90 UG/1
2 INHALANT RESPIRATORY (INHALATION) 4 TIMES DAILY PRN
Qty: 18 G | Refills: 0 | Status: SHIPPED | OUTPATIENT
Start: 2025-02-10

## 2025-02-10 RX ORDER — IPRATROPIUM BROMIDE AND ALBUTEROL SULFATE 2.5; .5 MG/3ML; MG/3ML
1 SOLUTION RESPIRATORY (INHALATION) PRN
Status: DISCONTINUED | OUTPATIENT
Start: 2025-02-10 | End: 2025-02-10 | Stop reason: HOSPADM

## 2025-02-10 RX ORDER — MAGNESIUM SULFATE IN WATER 40 MG/ML
4000 INJECTION, SOLUTION INTRAVENOUS ONCE
Status: COMPLETED | OUTPATIENT
Start: 2025-02-10 | End: 2025-02-10

## 2025-02-10 RX ADMIN — IPRATROPIUM BROMIDE AND ALBUTEROL SULFATE 1 DOSE: .5; 2.5 SOLUTION RESPIRATORY (INHALATION) at 12:43

## 2025-02-10 RX ADMIN — MAGNESIUM SULFATE HEPTAHYDRATE 4000 MG: 40 INJECTION, SOLUTION INTRAVENOUS at 11:18

## 2025-02-10 ASSESSMENT — ENCOUNTER SYMPTOMS
SORE THROAT: 0
EYE PAIN: 0
ABDOMINAL PAIN: 0
VOMITING: 0
SHORTNESS OF BREATH: 1
COUGH: 1
CHEST TIGHTNESS: 0
WHEEZING: 1
NAUSEA: 0

## 2025-02-10 ASSESSMENT — PAIN - FUNCTIONAL ASSESSMENT: PAIN_FUNCTIONAL_ASSESSMENT: NONE - DENIES PAIN

## 2025-02-10 NOTE — ED TRIAGE NOTES
Patient presents with complaints of cough, wheezing, and shortness of breath x 3-4 days, reports today it was worse and he did not have any inhalers or breathing treatments at home as he just recently moved. Patient on room air on arrival, 98%. EMS gave 125 solumedrol, IV epinephrine, and duonebs PTA. Patient in no distress on arrival. Skin pink, warm, and dry. Respirations equal and unlabored.

## 2025-02-10 NOTE — ED PROVIDER NOTES
Depression     Diverticulitis     Fatty liver     GERD (gastroesophageal reflux disease)     HTN (hypertension)     Hyperlipidemia     PTSD (post-traumatic stress disorder)          SURGICAL HISTORY       Past Surgical History:   Procedure Laterality Date    COLONOSCOPY  1/20/15    UPPER GASTROINTESTINAL ENDOSCOPY  1/20/15         CURRENT MEDICATIONS       Previous Medications    ACETAMINOPHEN (TYLENOL) 500 MG TABLET    Take 2 tablets by mouth every 6 hours as needed for Pain or Fever    ALBUTEROL (PROVENTIL) (2.5 MG/3ML) 0.083% NEBULIZER SOLUTION    Take 3 mLs by nebulization every 6 hours as needed for Wheezing    BENZONATATE (TESSALON PERLES) 100 MG CAPSULE    Take 1 capsule by mouth 3 times daily as needed for Cough    IBUPROFEN (ADVIL;MOTRIN) 600 MG TABLET    Take 1 tablet by mouth 4 times daily as needed for Pain       ALLERGIES     Patient has no known allergies.    FAMILY HISTORY       Family History   Problem Relation Age of Onset    Diabetes Mother     Cancer Mother     Arthritis Father     Other Father         anxiety    Asthma Sister     Asthma Brother           SOCIAL HISTORY       Social History     Socioeconomic History    Marital status:      Spouse name: None    Number of children: None    Years of education: None    Highest education level: None   Tobacco Use    Smoking status: Every Day     Types: E-Cigarettes    Smokeless tobacco: Never   Vaping Use    Vaping status: Every Day   Substance and Sexual Activity    Alcohol use: Yes     Comment: ocasionally    Drug use: Yes     Types: Marijuana (Weed)     Comment: daily    Sexual activity: Yes     Social Determinants of Health     Financial Resource Strain: Patient Declined (3/18/2024)    [facility]    Overall Financial Resource Strain (CARDIA)     Difficulty of Paying Living Expenses: Patient declined   Food Insecurity: Food Insecurity Present (8/23/2024)    Received from

## 2025-07-22 ENCOUNTER — HOSPITAL ENCOUNTER (EMERGENCY)
Age: 45
Discharge: HOME | End: 2025-07-22
Payer: COMMERCIAL

## 2025-07-22 VITALS
WEIGHT: 230.6 LBS | RESPIRATION RATE: 18 BRPM | DIASTOLIC BLOOD PRESSURE: 70 MMHG | TEMPERATURE: 98 F | BODY MASS INDEX: 34.16 KG/M2 | SYSTOLIC BLOOD PRESSURE: 149 MMHG | OXYGEN SATURATION: 98 % | HEART RATE: 57 BPM | HEIGHT: 69 IN

## 2025-07-22 DIAGNOSIS — J02.9 ACUTE PHARYNGITIS, UNSPECIFIED ETIOLOGY: Primary | ICD-10-CM

## 2025-07-22 LAB — STREP GRP A PCR: NEGATIVE

## 2025-07-22 PROCEDURE — 96372 THER/PROPH/DIAG INJ SC/IM: CPT

## 2025-07-22 PROCEDURE — 6360000002 HC RX W HCPCS: Performed by: PHYSICIAN ASSISTANT

## 2025-07-22 PROCEDURE — 87651 STREP A DNA AMP PROBE: CPT

## 2025-07-22 PROCEDURE — 6370000000 HC RX 637 (ALT 250 FOR IP): Performed by: PHYSICIAN ASSISTANT

## 2025-07-22 PROCEDURE — 99284 EMERGENCY DEPT VISIT MOD MDM: CPT

## 2025-07-22 RX ORDER — IBUPROFEN 800 MG/1
800 TABLET, FILM COATED ORAL EVERY 8 HOURS PRN
Qty: 20 TABLET | Refills: 0 | Status: SHIPPED | OUTPATIENT
Start: 2025-07-22

## 2025-07-22 RX ORDER — KETOROLAC TROMETHAMINE 30 MG/ML
30 INJECTION, SOLUTION INTRAMUSCULAR; INTRAVENOUS ONCE
Status: COMPLETED | OUTPATIENT
Start: 2025-07-22 | End: 2025-07-22

## 2025-07-22 RX ORDER — DEXAMETHASONE SODIUM PHOSPHATE 10 MG/ML
10 INJECTION, SOLUTION INTRA-ARTICULAR; INTRALESIONAL; INTRAMUSCULAR; INTRAVENOUS; SOFT TISSUE ONCE
Status: COMPLETED | OUTPATIENT
Start: 2025-07-22 | End: 2025-07-22

## 2025-07-22 RX ORDER — ACETAMINOPHEN 500 MG
1000 TABLET ORAL ONCE
Status: COMPLETED | OUTPATIENT
Start: 2025-07-22 | End: 2025-07-22

## 2025-07-22 RX ADMIN — ACETAMINOPHEN 1000 MG: 500 TABLET ORAL at 08:00

## 2025-07-22 RX ADMIN — DEXAMETHASONE SODIUM PHOSPHATE 10 MG: 10 INJECTION INTRAMUSCULAR; INTRAVENOUS at 07:59

## 2025-07-22 RX ADMIN — KETOROLAC TROMETHAMINE 30 MG: 30 INJECTION, SOLUTION INTRAMUSCULAR at 08:00

## 2025-07-22 ASSESSMENT — ENCOUNTER SYMPTOMS
NAUSEA: 0
COUGH: 0
VOMITING: 0
SHORTNESS OF BREATH: 0
EYE PAIN: 0
ABDOMINAL PAIN: 0
BACK PAIN: 0
DIARRHEA: 0
SORE THROAT: 1
RHINORRHEA: 0
PHOTOPHOBIA: 0

## 2025-07-22 ASSESSMENT — PAIN SCALES - GENERAL
PAINLEVEL_OUTOF10: 7
PAINLEVEL_OUTOF10: 7

## 2025-07-22 ASSESSMENT — PAIN - FUNCTIONAL ASSESSMENT: PAIN_FUNCTIONAL_ASSESSMENT: 0-10

## 2025-07-22 ASSESSMENT — PAIN DESCRIPTION - LOCATION
LOCATION: THROAT
LOCATION: THROAT

## 2025-07-22 NOTE — DISCHARGE INSTRUCTIONS
If you are having difficulty swallowing, fevers, drooling, or one sided swelling return back to the ed.

## 2025-07-22 NOTE — ED PROVIDER NOTES
Last Year: Sometimes true   Transportation Needs: No Transportation Needs (8/23/2024)    Received from [facility]    PRAPARE - Transportation     Lack of Transportation (Medical): No     Lack of Transportation (Non-Medical): No    Received from [facility]    Family and Community Support    Received from [facility]    Abuse Screen   Housing Stability: Low Risk  (8/23/2024)    Received from [facility]    Housing Stability Vital Sign     Unable to Pay for Housing in the Last Year: No     Number of Times Moved in the Last Year: 1     Homeless in the Last Year: No       SCREENINGS    [place] Coma Scale  Eye Opening: Spontaneous  Best Verbal Response: Oriented  Best Motor Response: Obeys commands  [place] Coma Scale Score: 15        PHYSICAL EXAM    (up to 7 for level 4, 8 or more for level 5)     ED Triage Vitals [07/22/25 0732]   BP Systolic BP Percentile Diastolic BP Percentile Temp Temp src Pulse Respirations SpO2   136/83 -- -- 98 °F (36.7 °C) -- 77 20 98 %      Height Weight - Scale         1.753 m (5' 9\") 104.6 kg (230 lb 9.6 oz)             Physical Exam  Vitals and nursing note reviewed.   Constitutional:       General: He is not in acute distress.     Appearance: Normal appearance. He is well-developed. He is not diaphoretic.   HENT:      Head: Normocephalic and atraumatic.      Nose: Nose normal.      Mouth/Throat:      Mouth: Mucous membranes are moist.      Pharynx: Uvula midline. Posterior oropharyngeal erythema present. No oropharyngeal exudate, uvula swelling or postnasal drip.   Eyes:      General: Lids are normal.      Conjunctiva/sclera: Conjunctivae normal.   Cardiovascular:      Rate and Rhythm: Normal rate and regular rhythm.      Pulses: Normal pulses.      Heart sounds: Normal heart sounds.   Pulmonary:      Effort: Pulmonary effort is normal.      Breath sounds: Normal breath sounds.   Abdominal:

## 2025-07-22 NOTE — ED TRIAGE NOTES
Arrived with report of pharyngitis  Pt states his throat feels swollen, states it is hard to swallow and difficult to breath at times   Started as a sore throat 2 days ago and got worse  Rates pain 7/10